# Patient Record
Sex: MALE | Race: WHITE | NOT HISPANIC OR LATINO
[De-identification: names, ages, dates, MRNs, and addresses within clinical notes are randomized per-mention and may not be internally consistent; named-entity substitution may affect disease eponyms.]

---

## 2017-01-04 ENCOUNTER — APPOINTMENT (OUTPATIENT)
Dept: ELECTROPHYSIOLOGY | Facility: HOSPITAL | Age: 82
End: 2017-01-04

## 2017-01-20 ENCOUNTER — NON-APPOINTMENT (OUTPATIENT)
Age: 82
End: 2017-01-20

## 2017-01-20 ENCOUNTER — APPOINTMENT (OUTPATIENT)
Dept: CARDIOLOGY | Facility: CLINIC | Age: 82
End: 2017-01-20

## 2017-01-20 VITALS
BODY MASS INDEX: 24.48 KG/M2 | OXYGEN SATURATION: 93 % | HEART RATE: 85 BPM | WEIGHT: 161 LBS | SYSTOLIC BLOOD PRESSURE: 112 MMHG | RESPIRATION RATE: 18 BRPM | DIASTOLIC BLOOD PRESSURE: 76 MMHG

## 2017-04-07 ENCOUNTER — NON-APPOINTMENT (OUTPATIENT)
Age: 82
End: 2017-04-07

## 2017-04-07 ENCOUNTER — APPOINTMENT (OUTPATIENT)
Dept: ELECTROPHYSIOLOGY | Facility: HOSPITAL | Age: 82
End: 2017-04-07

## 2017-04-07 VITALS — WEIGHT: 161 LBS | DIASTOLIC BLOOD PRESSURE: 72 MMHG | BODY MASS INDEX: 24.48 KG/M2 | SYSTOLIC BLOOD PRESSURE: 109 MMHG

## 2017-05-08 ENCOUNTER — APPOINTMENT (OUTPATIENT)
Dept: CARDIOLOGY | Facility: CLINIC | Age: 82
End: 2017-05-08

## 2017-05-08 VITALS
HEIGHT: 68 IN | TEMPERATURE: 97.6 F | BODY MASS INDEX: 24.4 KG/M2 | SYSTOLIC BLOOD PRESSURE: 118 MMHG | WEIGHT: 161 LBS | HEART RATE: 81 BPM | OXYGEN SATURATION: 99 % | DIASTOLIC BLOOD PRESSURE: 80 MMHG | RESPIRATION RATE: 17 BRPM

## 2017-05-08 RX ORDER — PNEUMOCOCCAL 23-VAL P-SAC VAC 25MCG/0.5
25 VIAL (ML) INJECTION
Qty: 1 | Refills: 0 | Status: COMPLETED | COMMUNITY
Start: 2016-10-07

## 2017-07-10 ENCOUNTER — APPOINTMENT (OUTPATIENT)
Dept: ELECTROPHYSIOLOGY | Facility: CLINIC | Age: 82
End: 2017-07-10
Payer: MEDICARE

## 2017-07-10 PROCEDURE — 93294 REM INTERROG EVL PM/LDLS PM: CPT

## 2017-09-18 ENCOUNTER — APPOINTMENT (OUTPATIENT)
Dept: CARDIOLOGY | Facility: CLINIC | Age: 82
End: 2017-09-18
Payer: MEDICARE

## 2017-09-18 ENCOUNTER — NON-APPOINTMENT (OUTPATIENT)
Age: 82
End: 2017-09-18

## 2017-09-18 VITALS
DIASTOLIC BLOOD PRESSURE: 88 MMHG | RESPIRATION RATE: 17 BRPM | HEART RATE: 67 BPM | TEMPERATURE: 98.4 F | WEIGHT: 157 LBS | OXYGEN SATURATION: 100 % | SYSTOLIC BLOOD PRESSURE: 131 MMHG | BODY MASS INDEX: 23.87 KG/M2

## 2017-09-18 VITALS — DIASTOLIC BLOOD PRESSURE: 78 MMHG | SYSTOLIC BLOOD PRESSURE: 117 MMHG

## 2017-09-18 PROCEDURE — 99214 OFFICE O/P EST MOD 30 MIN: CPT

## 2017-09-18 PROCEDURE — 93000 ELECTROCARDIOGRAM COMPLETE: CPT

## 2017-10-18 ENCOUNTER — APPOINTMENT (OUTPATIENT)
Dept: ELECTROPHYSIOLOGY | Facility: CLINIC | Age: 82
End: 2017-10-18
Payer: MEDICARE

## 2017-10-18 ENCOUNTER — NON-APPOINTMENT (OUTPATIENT)
Age: 82
End: 2017-10-18

## 2017-10-18 VITALS
DIASTOLIC BLOOD PRESSURE: 87 MMHG | WEIGHT: 160 LBS | BODY MASS INDEX: 24.25 KG/M2 | HEART RATE: 80 BPM | OXYGEN SATURATION: 97 % | HEIGHT: 68 IN | SYSTOLIC BLOOD PRESSURE: 122 MMHG

## 2017-10-18 PROCEDURE — 93280 PM DEVICE PROGR EVAL DUAL: CPT

## 2017-12-29 ENCOUNTER — APPOINTMENT (OUTPATIENT)
Dept: CARDIOLOGY | Facility: CLINIC | Age: 82
End: 2017-12-29
Payer: MEDICARE

## 2017-12-29 ENCOUNTER — NON-APPOINTMENT (OUTPATIENT)
Age: 82
End: 2017-12-29

## 2017-12-29 VITALS
SYSTOLIC BLOOD PRESSURE: 160 MMHG | BODY MASS INDEX: 24.33 KG/M2 | HEART RATE: 83 BPM | OXYGEN SATURATION: 96 % | TEMPERATURE: 97.7 F | DIASTOLIC BLOOD PRESSURE: 104 MMHG | WEIGHT: 160 LBS | RESPIRATION RATE: 18 BRPM

## 2017-12-29 DIAGNOSIS — R01.1 CARDIAC MURMUR, UNSPECIFIED: ICD-10-CM

## 2017-12-29 PROCEDURE — 93306 TTE W/DOPPLER COMPLETE: CPT

## 2017-12-29 PROCEDURE — 99215 OFFICE O/P EST HI 40 MIN: CPT

## 2017-12-29 PROCEDURE — 93000 ELECTROCARDIOGRAM COMPLETE: CPT | Mod: 59

## 2018-01-16 ENCOUNTER — OTHER (OUTPATIENT)
Age: 83
End: 2018-01-16

## 2018-01-29 ENCOUNTER — APPOINTMENT (OUTPATIENT)
Dept: ELECTROPHYSIOLOGY | Facility: CLINIC | Age: 83
End: 2018-01-29
Payer: MEDICARE

## 2018-01-29 PROCEDURE — 93294 REM INTERROG EVL PM/LDLS PM: CPT

## 2018-01-29 PROCEDURE — 93296 REM INTERROG EVL PM/IDS: CPT

## 2018-04-25 ENCOUNTER — NON-APPOINTMENT (OUTPATIENT)
Age: 83
End: 2018-04-25

## 2018-04-25 ENCOUNTER — APPOINTMENT (OUTPATIENT)
Dept: CARDIOLOGY | Facility: CLINIC | Age: 83
End: 2018-04-25

## 2018-04-25 ENCOUNTER — APPOINTMENT (OUTPATIENT)
Dept: CARDIOLOGY | Facility: CLINIC | Age: 83
End: 2018-04-25
Payer: MEDICARE

## 2018-04-25 VITALS
HEART RATE: 76 BPM | OXYGEN SATURATION: 99 % | RESPIRATION RATE: 18 BRPM | SYSTOLIC BLOOD PRESSURE: 126 MMHG | DIASTOLIC BLOOD PRESSURE: 82 MMHG | TEMPERATURE: 98.2 F | WEIGHT: 162 LBS | BODY MASS INDEX: 24.63 KG/M2

## 2018-04-25 PROCEDURE — 99214 OFFICE O/P EST MOD 30 MIN: CPT

## 2018-04-25 PROCEDURE — 93000 ELECTROCARDIOGRAM COMPLETE: CPT | Mod: 59

## 2018-04-30 ENCOUNTER — APPOINTMENT (OUTPATIENT)
Dept: ELECTROPHYSIOLOGY | Facility: CLINIC | Age: 83
End: 2018-04-30
Payer: MEDICARE

## 2018-04-30 VITALS
SYSTOLIC BLOOD PRESSURE: 121 MMHG | BODY MASS INDEX: 23.7 KG/M2 | WEIGHT: 160 LBS | DIASTOLIC BLOOD PRESSURE: 85 MMHG | HEART RATE: 68 BPM | HEIGHT: 69 IN | OXYGEN SATURATION: 98 %

## 2018-04-30 PROCEDURE — 93280 PM DEVICE PROGR EVAL DUAL: CPT

## 2018-06-18 ENCOUNTER — RX RENEWAL (OUTPATIENT)
Age: 83
End: 2018-06-18

## 2018-08-02 ENCOUNTER — APPOINTMENT (OUTPATIENT)
Dept: ELECTROPHYSIOLOGY | Facility: CLINIC | Age: 83
End: 2018-08-02
Payer: MEDICARE

## 2018-08-02 PROCEDURE — 93296 REM INTERROG EVL PM/IDS: CPT

## 2018-08-02 PROCEDURE — 93294 REM INTERROG EVL PM/LDLS PM: CPT

## 2018-09-05 ENCOUNTER — APPOINTMENT (OUTPATIENT)
Dept: CARDIOLOGY | Facility: CLINIC | Age: 83
End: 2018-09-05
Payer: MEDICARE

## 2018-09-05 ENCOUNTER — NON-APPOINTMENT (OUTPATIENT)
Age: 83
End: 2018-09-05

## 2018-09-05 VITALS
OXYGEN SATURATION: 99 % | BODY MASS INDEX: 23.04 KG/M2 | WEIGHT: 156 LBS | TEMPERATURE: 98.4 F | DIASTOLIC BLOOD PRESSURE: 89 MMHG | HEART RATE: 80 BPM | RESPIRATION RATE: 17 BRPM | SYSTOLIC BLOOD PRESSURE: 137 MMHG

## 2018-09-05 PROCEDURE — 99214 OFFICE O/P EST MOD 30 MIN: CPT

## 2018-09-05 PROCEDURE — 93000 ELECTROCARDIOGRAM COMPLETE: CPT | Mod: 59

## 2018-10-30 ENCOUNTER — APPOINTMENT (OUTPATIENT)
Dept: ELECTROPHYSIOLOGY | Facility: CLINIC | Age: 83
End: 2018-10-30
Payer: MEDICARE

## 2018-10-30 VITALS
WEIGHT: 160 LBS | HEART RATE: 88 BPM | OXYGEN SATURATION: 99 % | BODY MASS INDEX: 23.7 KG/M2 | DIASTOLIC BLOOD PRESSURE: 88 MMHG | HEIGHT: 69 IN | SYSTOLIC BLOOD PRESSURE: 133 MMHG

## 2018-10-30 PROCEDURE — 93280 PM DEVICE PROGR EVAL DUAL: CPT

## 2019-01-01 ENCOUNTER — NON-APPOINTMENT (OUTPATIENT)
Age: 84
End: 2019-01-01

## 2019-01-01 ENCOUNTER — TRANSCRIPTION ENCOUNTER (OUTPATIENT)
Age: 84
End: 2019-01-01

## 2019-01-01 ENCOUNTER — INPATIENT (INPATIENT)
Facility: HOSPITAL | Age: 84
LOS: 1 days | Discharge: HOME | End: 2019-10-30
Attending: SURGERY | Admitting: SURGERY
Payer: MEDICARE

## 2019-01-01 ENCOUNTER — APPOINTMENT (OUTPATIENT)
Dept: SURGICAL ONCOLOGY | Facility: HOSPITAL | Age: 84
End: 2019-01-01

## 2019-01-01 ENCOUNTER — RESULT REVIEW (OUTPATIENT)
Age: 84
End: 2019-01-01

## 2019-01-01 ENCOUNTER — APPOINTMENT (OUTPATIENT)
Dept: CARDIOLOGY | Facility: CLINIC | Age: 84
End: 2019-01-01
Payer: MEDICARE

## 2019-01-01 ENCOUNTER — APPOINTMENT (OUTPATIENT)
Dept: SURGICAL ONCOLOGY | Facility: CLINIC | Age: 84
End: 2019-01-01
Payer: MEDICARE

## 2019-01-01 ENCOUNTER — APPOINTMENT (OUTPATIENT)
Dept: ELECTROPHYSIOLOGY | Facility: CLINIC | Age: 84
End: 2019-01-01
Payer: MEDICARE

## 2019-01-01 VITALS
OXYGEN SATURATION: 100 % | HEART RATE: 63 BPM | RESPIRATION RATE: 17 BRPM | SYSTOLIC BLOOD PRESSURE: 138 MMHG | BODY MASS INDEX: 23.89 KG/M2 | WEIGHT: 148 LBS | TEMPERATURE: 97.4 F | DIASTOLIC BLOOD PRESSURE: 91 MMHG

## 2019-01-01 VITALS
OXYGEN SATURATION: 100 % | TEMPERATURE: 98 F | WEIGHT: 110.01 LBS | RESPIRATION RATE: 15 BRPM | HEART RATE: 88 BPM | SYSTOLIC BLOOD PRESSURE: 128 MMHG | DIASTOLIC BLOOD PRESSURE: 92 MMHG | HEIGHT: 63 IN

## 2019-01-01 VITALS
HEART RATE: 66 BPM | DIASTOLIC BLOOD PRESSURE: 79 MMHG | SYSTOLIC BLOOD PRESSURE: 142 MMHG | TEMPERATURE: 97 F | OXYGEN SATURATION: 94 % | RESPIRATION RATE: 16 BRPM

## 2019-01-01 VITALS — DIASTOLIC BLOOD PRESSURE: 84 MMHG | SYSTOLIC BLOOD PRESSURE: 148 MMHG

## 2019-01-01 VITALS
HEART RATE: 91 BPM | OXYGEN SATURATION: 98 % | HEIGHT: 66 IN | BODY MASS INDEX: 22.82 KG/M2 | SYSTOLIC BLOOD PRESSURE: 125 MMHG | DIASTOLIC BLOOD PRESSURE: 86 MMHG | WEIGHT: 142 LBS

## 2019-01-01 DIAGNOSIS — K80.50 CALCULUS OF BILE DUCT WITHOUT CHOLANGITIS OR CHOLECYSTITIS WITHOUT OBSTRUCTION: ICD-10-CM

## 2019-01-01 DIAGNOSIS — I44.2 ATRIOVENTRICULAR BLOCK, COMPLETE: ICD-10-CM

## 2019-01-01 DIAGNOSIS — Z98.890 OTHER SPECIFIED POSTPROCEDURAL STATES: Chronic | ICD-10-CM

## 2019-01-01 DIAGNOSIS — K81.1 CHRONIC CHOLECYSTITIS: ICD-10-CM

## 2019-01-01 DIAGNOSIS — Z90.49 ACQUIRED ABSENCE OF OTHER SPECIFIED PARTS OF DIGESTIVE TRACT: Chronic | ICD-10-CM

## 2019-01-01 DIAGNOSIS — K80.70 CALCULUS OF GALLBLADDER AND BILE DUCT W/OUT CHOLECYSTITIS W/OUT OBSTRUCTION: ICD-10-CM

## 2019-01-01 LAB
ALBUMIN SERPL ELPH-MCNC: 3.1 G/DL — LOW (ref 3.3–5)
ALBUMIN SERPL ELPH-MCNC: 3.5 G/DL — SIGNIFICANT CHANGE UP (ref 3.3–5)
ALP SERPL-CCNC: 54 U/L — SIGNIFICANT CHANGE UP (ref 40–120)
ALP SERPL-CCNC: 59 U/L — SIGNIFICANT CHANGE UP (ref 40–120)
ALT FLD-CCNC: 166 U/L — HIGH (ref 4–41)
ALT FLD-CCNC: 202 U/L — HIGH (ref 4–41)
ANION GAP SERPL CALC-SCNC: 12 MMO/L — SIGNIFICANT CHANGE UP (ref 7–14)
ANION GAP SERPL CALC-SCNC: 15 MMO/L — HIGH (ref 7–14)
AST SERPL-CCNC: 202 U/L — HIGH (ref 4–40)
AST SERPL-CCNC: 258 U/L — HIGH (ref 4–40)
BASE EXCESS BLDA CALC-SCNC: 0 MMOL/L — SIGNIFICANT CHANGE UP
BILIRUB SERPL-MCNC: 0.3 MG/DL — SIGNIFICANT CHANGE UP (ref 0.2–1.2)
BILIRUB SERPL-MCNC: 0.4 MG/DL — SIGNIFICANT CHANGE UP (ref 0.2–1.2)
BUN SERPL-MCNC: 25 MG/DL — HIGH (ref 7–23)
BUN SERPL-MCNC: 26 MG/DL — HIGH (ref 7–23)
CA-I BLDA-SCNC: 1.19 MMOL/L — SIGNIFICANT CHANGE UP (ref 1.15–1.29)
CALCIUM SERPL-MCNC: 9 MG/DL — SIGNIFICANT CHANGE UP (ref 8.4–10.5)
CALCIUM SERPL-MCNC: 9.1 MG/DL — SIGNIFICANT CHANGE UP (ref 8.4–10.5)
CHLORIDE SERPL-SCNC: 101 MMOL/L — SIGNIFICANT CHANGE UP (ref 98–107)
CHLORIDE SERPL-SCNC: 103 MMOL/L — SIGNIFICANT CHANGE UP (ref 98–107)
CO2 SERPL-SCNC: 21 MMOL/L — LOW (ref 22–31)
CO2 SERPL-SCNC: 23 MMOL/L — SIGNIFICANT CHANGE UP (ref 22–31)
CREAT SERPL-MCNC: 1.12 MG/DL — SIGNIFICANT CHANGE UP (ref 0.5–1.3)
CREAT SERPL-MCNC: 1.16 MG/DL — SIGNIFICANT CHANGE UP (ref 0.5–1.3)
GLUCOSE BLDA-MCNC: 78 MG/DL — SIGNIFICANT CHANGE UP (ref 70–99)
GLUCOSE SERPL-MCNC: 102 MG/DL — HIGH (ref 70–99)
GLUCOSE SERPL-MCNC: 117 MG/DL — HIGH (ref 70–99)
HCO3 BLDA-SCNC: 25 MMOL/L — SIGNIFICANT CHANGE UP (ref 22–26)
HCT VFR BLD CALC: 29.2 % — LOW (ref 39–50)
HCT VFR BLD CALC: 29.8 % — LOW (ref 39–50)
HCT VFR BLDA CALC: 30 % — LOW (ref 39–51)
HGB BLD-MCNC: 9.2 G/DL — LOW (ref 13–17)
HGB BLD-MCNC: 9.7 G/DL — LOW (ref 13–17)
HGB BLDA-MCNC: 9.7 G/DL — LOW (ref 13–17)
MAGNESIUM SERPL-MCNC: 1.6 MG/DL — SIGNIFICANT CHANGE UP (ref 1.6–2.6)
MAGNESIUM SERPL-MCNC: 1.9 MG/DL — SIGNIFICANT CHANGE UP (ref 1.6–2.6)
MCHC RBC-ENTMCNC: 30.9 % — LOW (ref 32–36)
MCHC RBC-ENTMCNC: 30.9 PG — SIGNIFICANT CHANGE UP (ref 27–34)
MCHC RBC-ENTMCNC: 32.1 PG — SIGNIFICANT CHANGE UP (ref 27–34)
MCHC RBC-ENTMCNC: 33.2 % — SIGNIFICANT CHANGE UP (ref 32–36)
MCV RBC AUTO: 100 FL — SIGNIFICANT CHANGE UP (ref 80–100)
MCV RBC AUTO: 96.7 FL — SIGNIFICANT CHANGE UP (ref 80–100)
NRBC # FLD: 0 K/UL — SIGNIFICANT CHANGE UP (ref 0–0)
NRBC # FLD: 0 K/UL — SIGNIFICANT CHANGE UP (ref 0–0)
PCO2 BLDA: 35 MMHG — SIGNIFICANT CHANGE UP (ref 35–48)
PH BLDA: 7.44 PH — SIGNIFICANT CHANGE UP (ref 7.35–7.45)
PHOSPHATE SERPL-MCNC: 4.5 MG/DL — SIGNIFICANT CHANGE UP (ref 2.5–4.5)
PHOSPHATE SERPL-MCNC: 5 MG/DL — HIGH (ref 2.5–4.5)
PLATELET # BLD AUTO: 136 K/UL — LOW (ref 150–400)
PLATELET # BLD AUTO: 146 K/UL — LOW (ref 150–400)
PMV BLD: 10.2 FL — SIGNIFICANT CHANGE UP (ref 7–13)
PMV BLD: 9.6 FL — SIGNIFICANT CHANGE UP (ref 7–13)
PO2 BLDA: 377 MMHG — HIGH (ref 83–108)
POTASSIUM BLDA-SCNC: 3.8 MMOL/L — SIGNIFICANT CHANGE UP (ref 3.4–4.5)
POTASSIUM SERPL-MCNC: 3.8 MMOL/L — SIGNIFICANT CHANGE UP (ref 3.5–5.3)
POTASSIUM SERPL-MCNC: 4.2 MMOL/L — SIGNIFICANT CHANGE UP (ref 3.5–5.3)
POTASSIUM SERPL-SCNC: 3.8 MMOL/L — SIGNIFICANT CHANGE UP (ref 3.5–5.3)
POTASSIUM SERPL-SCNC: 4.2 MMOL/L — SIGNIFICANT CHANGE UP (ref 3.5–5.3)
PROT SERPL-MCNC: 6 G/DL — SIGNIFICANT CHANGE UP (ref 6–8.3)
PROT SERPL-MCNC: 6.5 G/DL — SIGNIFICANT CHANGE UP (ref 6–8.3)
RBC # BLD: 2.98 M/UL — LOW (ref 4.2–5.8)
RBC # BLD: 3.02 M/UL — LOW (ref 4.2–5.8)
RBC # FLD: 14.2 % — SIGNIFICANT CHANGE UP (ref 10.3–14.5)
RBC # FLD: 14.2 % — SIGNIFICANT CHANGE UP (ref 10.3–14.5)
RH IG SCN BLD-IMP: NEGATIVE — SIGNIFICANT CHANGE UP
SAO2 % BLDA: 100 % — HIGH (ref 95–99)
SODIUM BLDA-SCNC: 136 MMOL/L — SIGNIFICANT CHANGE UP (ref 136–146)
SODIUM SERPL-SCNC: 136 MMOL/L — SIGNIFICANT CHANGE UP (ref 135–145)
SODIUM SERPL-SCNC: 139 MMOL/L — SIGNIFICANT CHANGE UP (ref 135–145)
SURGICAL PATHOLOGY STUDY: SIGNIFICANT CHANGE UP
WBC # BLD: 11.68 K/UL — HIGH (ref 3.8–10.5)
WBC # BLD: 13.18 K/UL — HIGH (ref 3.8–10.5)
WBC # FLD AUTO: 11.68 K/UL — HIGH (ref 3.8–10.5)
WBC # FLD AUTO: 13.18 K/UL — HIGH (ref 3.8–10.5)

## 2019-01-01 PROCEDURE — 93294 REM INTERROG EVL PM/LDLS PM: CPT

## 2019-01-01 PROCEDURE — 99215 OFFICE O/P EST HI 40 MIN: CPT

## 2019-01-01 PROCEDURE — S2900 ROBOTIC SURGICAL SYSTEM: CPT | Mod: NC

## 2019-01-01 PROCEDURE — 93000 ELECTROCARDIOGRAM COMPLETE: CPT | Mod: 59

## 2019-01-01 PROCEDURE — 49600 REPAIR UMBILICAL LESION: CPT

## 2019-01-01 PROCEDURE — 99024 POSTOP FOLLOW-UP VISIT: CPT

## 2019-01-01 PROCEDURE — 47300 SURGERY FOR LIVER LESION: CPT

## 2019-01-01 PROCEDURE — 44050 REDUCE BOWEL OBSTRUCTION: CPT

## 2019-01-01 PROCEDURE — 88304 TISSUE EXAM BY PATHOLOGIST: CPT | Mod: 26

## 2019-01-01 PROCEDURE — 93296 REM INTERROG EVL PM/IDS: CPT

## 2019-01-01 RX ORDER — TAMSULOSIN HYDROCHLORIDE 0.4 MG/1
0.4 CAPSULE ORAL AT BEDTIME
Refills: 0 | Status: DISCONTINUED | OUTPATIENT
Start: 2019-01-01 | End: 2019-01-01

## 2019-01-01 RX ORDER — METOPROLOL TARTRATE 50 MG
12.5 TABLET ORAL
Qty: 0 | Refills: 0 | DISCHARGE

## 2019-01-01 RX ORDER — OXYCODONE HYDROCHLORIDE 5 MG/1
1 TABLET ORAL
Qty: 18 | Refills: 0
Start: 2019-01-01

## 2019-01-01 RX ORDER — ENOXAPARIN SODIUM 100 MG/ML
40 INJECTION SUBCUTANEOUS DAILY
Refills: 0 | Status: DISCONTINUED | OUTPATIENT
Start: 2019-01-01 | End: 2019-01-01

## 2019-01-01 RX ORDER — METOPROLOL TARTRATE 50 MG
12.5 TABLET ORAL
Refills: 0 | Status: DISCONTINUED | OUTPATIENT
Start: 2019-01-01 | End: 2019-01-01

## 2019-01-01 RX ORDER — MAGNESIUM SULFATE 500 MG/ML
2 VIAL (ML) INJECTION ONCE
Refills: 0 | Status: COMPLETED | OUTPATIENT
Start: 2019-01-01 | End: 2019-01-01

## 2019-01-01 RX ORDER — CHOLECALCIFEROL (VITAMIN D3) 125 MCG
1 CAPSULE ORAL
Qty: 0 | Refills: 0 | DISCHARGE

## 2019-01-01 RX ORDER — ASPIRIN/CALCIUM CARB/MAGNESIUM 324 MG
1 TABLET ORAL
Qty: 0 | Refills: 0 | DISCHARGE

## 2019-01-01 RX ORDER — OXYCODONE HYDROCHLORIDE 5 MG/1
5 TABLET ORAL EVERY 4 HOURS
Refills: 0 | Status: DISCONTINUED | OUTPATIENT
Start: 2019-01-01 | End: 2019-01-01

## 2019-01-01 RX ORDER — POTASSIUM CHLORIDE 20 MEQ
20 PACKET (EA) ORAL ONCE
Refills: 0 | Status: COMPLETED | OUTPATIENT
Start: 2019-01-01 | End: 2019-01-01

## 2019-01-01 RX ORDER — LEVOTHYROXINE SODIUM 125 MCG
1 TABLET ORAL
Qty: 0 | Refills: 0 | DISCHARGE

## 2019-01-01 RX ORDER — SIMVASTATIN 20 MG/1
10 TABLET, FILM COATED ORAL AT BEDTIME
Refills: 0 | Status: DISCONTINUED | OUTPATIENT
Start: 2019-01-01 | End: 2019-01-01

## 2019-01-01 RX ORDER — SIMVASTATIN 20 MG/1
1 TABLET, FILM COATED ORAL
Qty: 0 | Refills: 0 | DISCHARGE

## 2019-01-01 RX ORDER — ASPIRIN/CALCIUM CARB/MAGNESIUM 324 MG
81 TABLET ORAL DAILY
Refills: 0 | Status: DISCONTINUED | OUTPATIENT
Start: 2019-01-01 | End: 2019-01-01

## 2019-01-01 RX ORDER — ACETAMINOPHEN 500 MG
2 TABLET ORAL
Qty: 0 | Refills: 0 | DISCHARGE
Start: 2019-01-01

## 2019-01-01 RX ORDER — ONDANSETRON 8 MG/1
4 TABLET, FILM COATED ORAL ONCE
Refills: 0 | Status: COMPLETED | OUTPATIENT
Start: 2019-01-01 | End: 2019-01-01

## 2019-01-01 RX ORDER — ACETAMINOPHEN 500 MG
1000 TABLET ORAL EVERY 6 HOURS
Refills: 0 | Status: COMPLETED | OUTPATIENT
Start: 2019-01-01 | End: 2019-01-01

## 2019-01-01 RX ORDER — MULTIVIT-MIN/FERROUS GLUCONATE 9 MG/15 ML
1 LIQUID (ML) ORAL
Qty: 0 | Refills: 0 | DISCHARGE

## 2019-01-01 RX ORDER — ACETAMINOPHEN 500 MG
2 TABLET ORAL
Qty: 0 | Refills: 0 | DISCHARGE

## 2019-01-01 RX ORDER — TAMSULOSIN HYDROCHLORIDE 0.4 MG/1
1 CAPSULE ORAL
Qty: 0 | Refills: 0 | DISCHARGE

## 2019-01-01 RX ORDER — SODIUM CHLORIDE 9 MG/ML
1000 INJECTION, SOLUTION INTRAVENOUS
Refills: 0 | Status: DISCONTINUED | OUTPATIENT
Start: 2019-01-01 | End: 2019-01-01

## 2019-01-01 RX ORDER — ACETAMINOPHEN 500 MG
650 TABLET ORAL EVERY 6 HOURS
Refills: 0 | Status: DISCONTINUED | OUTPATIENT
Start: 2019-01-01 | End: 2019-01-01

## 2019-01-01 RX ORDER — OXYCODONE HYDROCHLORIDE 5 MG/1
10 TABLET ORAL EVERY 4 HOURS
Refills: 0 | Status: DISCONTINUED | OUTPATIENT
Start: 2019-01-01 | End: 2019-01-01

## 2019-01-01 RX ORDER — METOPROLOL TARTRATE 50 MG
12.5 TABLET ORAL
Qty: 0 | Refills: 0 | DISCHARGE
Start: 2019-01-01

## 2019-01-01 RX ORDER — MAGNESIUM SULFATE 500 MG/ML
2 VIAL (ML) INJECTION ONCE
Refills: 0 | Status: DISCONTINUED | OUTPATIENT
Start: 2019-01-01 | End: 2019-01-01

## 2019-01-01 RX ORDER — LEVOTHYROXINE SODIUM 125 MCG
150 TABLET ORAL DAILY
Refills: 0 | Status: DISCONTINUED | OUTPATIENT
Start: 2019-01-01 | End: 2019-01-01

## 2019-01-01 RX ORDER — OMEGA-3 ACID ETHYL ESTERS 1 G
1 CAPSULE ORAL
Qty: 0 | Refills: 0 | DISCHARGE

## 2019-01-01 RX ADMIN — ENOXAPARIN SODIUM 40 MILLIGRAM(S): 100 INJECTION SUBCUTANEOUS at 12:44

## 2019-01-01 RX ADMIN — SIMVASTATIN 10 MILLIGRAM(S): 20 TABLET, FILM COATED ORAL at 22:10

## 2019-01-01 RX ADMIN — Medication 81 MILLIGRAM(S): at 12:44

## 2019-01-01 RX ADMIN — OXYCODONE HYDROCHLORIDE 10 MILLIGRAM(S): 5 TABLET ORAL at 09:30

## 2019-01-01 RX ADMIN — TAMSULOSIN HYDROCHLORIDE 0.4 MILLIGRAM(S): 0.4 CAPSULE ORAL at 21:57

## 2019-01-01 RX ADMIN — Medication 1000 MILLIGRAM(S): at 01:08

## 2019-01-01 RX ADMIN — Medication 12.5 MILLIGRAM(S): at 05:59

## 2019-01-01 RX ADMIN — TAMSULOSIN HYDROCHLORIDE 0.4 MILLIGRAM(S): 0.4 CAPSULE ORAL at 21:18

## 2019-01-01 RX ADMIN — Medication 1000 MILLIGRAM(S): at 13:30

## 2019-01-01 RX ADMIN — Medication 150 MICROGRAM(S): at 05:54

## 2019-01-01 RX ADMIN — Medication 150 MICROGRAM(S): at 05:59

## 2019-01-01 RX ADMIN — Medication 81 MILLIGRAM(S): at 12:12

## 2019-01-01 RX ADMIN — ONDANSETRON 4 MILLIGRAM(S): 8 TABLET, FILM COATED ORAL at 19:43

## 2019-01-01 RX ADMIN — Medication 400 MILLIGRAM(S): at 05:54

## 2019-01-01 RX ADMIN — Medication 400 MILLIGRAM(S): at 12:44

## 2019-01-01 RX ADMIN — OXYCODONE HYDROCHLORIDE 10 MILLIGRAM(S): 5 TABLET ORAL at 09:08

## 2019-01-01 RX ADMIN — Medication 650 MILLIGRAM(S): at 12:42

## 2019-01-01 RX ADMIN — OXYCODONE HYDROCHLORIDE 10 MILLIGRAM(S): 5 TABLET ORAL at 00:08

## 2019-01-01 RX ADMIN — SODIUM CHLORIDE 50 MILLILITER(S): 9 INJECTION, SOLUTION INTRAVENOUS at 08:00

## 2019-01-01 RX ADMIN — Medication 50 GRAM(S): at 00:33

## 2019-01-01 RX ADMIN — Medication 1000 MILLIGRAM(S): at 06:24

## 2019-01-01 RX ADMIN — ENOXAPARIN SODIUM 40 MILLIGRAM(S): 100 INJECTION SUBCUTANEOUS at 12:12

## 2019-01-01 RX ADMIN — Medication 400 MILLIGRAM(S): at 00:38

## 2019-01-01 RX ADMIN — Medication 12.5 MILLIGRAM(S): at 18:02

## 2019-01-01 RX ADMIN — OXYCODONE HYDROCHLORIDE 10 MILLIGRAM(S): 5 TABLET ORAL at 01:00

## 2019-01-01 RX ADMIN — Medication 20 MILLIEQUIVALENT(S): at 08:02

## 2019-01-01 RX ADMIN — Medication 650 MILLIGRAM(S): at 12:12

## 2019-01-01 RX ADMIN — SIMVASTATIN 10 MILLIGRAM(S): 20 TABLET, FILM COATED ORAL at 21:57

## 2019-02-12 ENCOUNTER — APPOINTMENT (OUTPATIENT)
Dept: ELECTROPHYSIOLOGY | Facility: CLINIC | Age: 84
End: 2019-02-12
Payer: MEDICARE

## 2019-02-12 PROCEDURE — 93294 REM INTERROG EVL PM/LDLS PM: CPT

## 2019-02-12 PROCEDURE — 93296 REM INTERROG EVL PM/IDS: CPT

## 2019-03-01 ENCOUNTER — MEDICATION RENEWAL (OUTPATIENT)
Age: 84
End: 2019-03-01

## 2019-03-04 ENCOUNTER — RX RENEWAL (OUTPATIENT)
Age: 84
End: 2019-03-04

## 2019-03-22 ENCOUNTER — INPATIENT (INPATIENT)
Facility: HOSPITAL | Age: 84
LOS: 6 days | Discharge: HOME | DRG: 417 | End: 2019-03-29
Attending: SURGERY | Admitting: SURGERY
Payer: MEDICARE

## 2019-03-22 VITALS
HEART RATE: 84 BPM | TEMPERATURE: 98 F | OXYGEN SATURATION: 95 % | RESPIRATION RATE: 18 BRPM | WEIGHT: 160.06 LBS | HEIGHT: 70 IN | SYSTOLIC BLOOD PRESSURE: 134 MMHG | DIASTOLIC BLOOD PRESSURE: 83 MMHG

## 2019-03-22 DIAGNOSIS — K85.10 BILIARY ACUTE PANCREATITIS WITHOUT NECROSIS OR INFECTION: ICD-10-CM

## 2019-03-22 LAB
ALBUMIN SERPL ELPH-MCNC: 3.8 G/DL — SIGNIFICANT CHANGE UP (ref 3.3–5)
ALP SERPL-CCNC: 341 U/L — HIGH (ref 40–120)
ALT FLD-CCNC: 429 U/L — HIGH (ref 10–45)
ANION GAP SERPL CALC-SCNC: 12 MMOL/L — SIGNIFICANT CHANGE UP (ref 5–17)
APPEARANCE UR: CLEAR — SIGNIFICANT CHANGE UP
AST SERPL-CCNC: 537 U/L — HIGH (ref 10–40)
BACTERIA # UR AUTO: NEGATIVE — SIGNIFICANT CHANGE UP
BASOPHILS # BLD AUTO: 0 K/UL — SIGNIFICANT CHANGE UP (ref 0–0.2)
BASOPHILS NFR BLD AUTO: 0.1 % — SIGNIFICANT CHANGE UP (ref 0–2)
BILIRUB SERPL-MCNC: 1.2 MG/DL — SIGNIFICANT CHANGE UP (ref 0.2–1.2)
BILIRUB UR-MCNC: NEGATIVE — SIGNIFICANT CHANGE UP
BUN SERPL-MCNC: 25 MG/DL — HIGH (ref 7–23)
CALCIUM SERPL-MCNC: 9.5 MG/DL — SIGNIFICANT CHANGE UP (ref 8.4–10.5)
CHLORIDE SERPL-SCNC: 100 MMOL/L — SIGNIFICANT CHANGE UP (ref 96–108)
CO2 SERPL-SCNC: 26 MMOL/L — SIGNIFICANT CHANGE UP (ref 22–31)
COLOR SPEC: YELLOW — SIGNIFICANT CHANGE UP
CREAT SERPL-MCNC: 1.06 MG/DL — SIGNIFICANT CHANGE UP (ref 0.5–1.3)
DIFF PNL FLD: ABNORMAL
EOSINOPHIL # BLD AUTO: 0.1 K/UL — SIGNIFICANT CHANGE UP (ref 0–0.5)
EOSINOPHIL NFR BLD AUTO: 0.5 % — SIGNIFICANT CHANGE UP (ref 0–6)
EPI CELLS # UR: 0 /HPF — SIGNIFICANT CHANGE UP
GLUCOSE SERPL-MCNC: 109 MG/DL — HIGH (ref 70–99)
GLUCOSE UR QL: NEGATIVE — SIGNIFICANT CHANGE UP
HCT VFR BLD CALC: 34 % — LOW (ref 39–50)
HGB BLD-MCNC: 11.3 G/DL — LOW (ref 13–17)
HYALINE CASTS # UR AUTO: 0 /LPF — SIGNIFICANT CHANGE UP (ref 0–2)
KETONES UR-MCNC: NEGATIVE — SIGNIFICANT CHANGE UP
LACTATE BLDV-MCNC: 1.3 MMOL/L — SIGNIFICANT CHANGE UP (ref 0.7–2)
LEUKOCYTE ESTERASE UR-ACNC: NEGATIVE — SIGNIFICANT CHANGE UP
LIDOCAIN IGE QN: >530 U/L — HIGH (ref 7–60)
LYMPHOCYTES # BLD AUTO: 1 K/UL — SIGNIFICANT CHANGE UP (ref 1–3.3)
LYMPHOCYTES # BLD AUTO: 9.6 % — LOW (ref 13–44)
MCHC RBC-ENTMCNC: 33.1 PG — SIGNIFICANT CHANGE UP (ref 27–34)
MCHC RBC-ENTMCNC: 33.2 GM/DL — SIGNIFICANT CHANGE UP (ref 32–36)
MCV RBC AUTO: 99.8 FL — SIGNIFICANT CHANGE UP (ref 80–100)
MONOCYTES # BLD AUTO: 0.6 K/UL — SIGNIFICANT CHANGE UP (ref 0–0.9)
MONOCYTES NFR BLD AUTO: 5.8 % — SIGNIFICANT CHANGE UP (ref 2–14)
NEUTROPHILS # BLD AUTO: 8.6 K/UL — HIGH (ref 1.8–7.4)
NEUTROPHILS NFR BLD AUTO: 84 % — HIGH (ref 43–77)
NITRITE UR-MCNC: NEGATIVE — SIGNIFICANT CHANGE UP
PH UR: 7.5 — SIGNIFICANT CHANGE UP (ref 5–8)
PLATELET # BLD AUTO: 205 K/UL — SIGNIFICANT CHANGE UP (ref 150–400)
POTASSIUM SERPL-MCNC: 4.1 MMOL/L — SIGNIFICANT CHANGE UP (ref 3.5–5.3)
POTASSIUM SERPL-SCNC: 4.1 MMOL/L — SIGNIFICANT CHANGE UP (ref 3.5–5.3)
PROT SERPL-MCNC: 7 G/DL — SIGNIFICANT CHANGE UP (ref 6–8.3)
PROT UR-MCNC: ABNORMAL
RBC # BLD: 3.41 M/UL — LOW (ref 4.2–5.8)
RBC # FLD: 12.1 % — SIGNIFICANT CHANGE UP (ref 10.3–14.5)
RBC CASTS # UR COMP ASSIST: 2 /HPF — SIGNIFICANT CHANGE UP (ref 0–4)
SODIUM SERPL-SCNC: 138 MMOL/L — SIGNIFICANT CHANGE UP (ref 135–145)
SP GR SPEC: 1.02 — SIGNIFICANT CHANGE UP (ref 1.01–1.02)
UROBILINOGEN FLD QL: NEGATIVE — SIGNIFICANT CHANGE UP
WBC # BLD: 10.2 K/UL — SIGNIFICANT CHANGE UP (ref 3.8–10.5)
WBC # FLD AUTO: 10.2 K/UL — SIGNIFICANT CHANGE UP (ref 3.8–10.5)
WBC UR QL: 1 /HPF — SIGNIFICANT CHANGE UP (ref 0–5)

## 2019-03-22 PROCEDURE — 74177 CT ABD & PELVIS W/CONTRAST: CPT | Mod: 26

## 2019-03-22 PROCEDURE — 99222 1ST HOSP IP/OBS MODERATE 55: CPT | Mod: GC

## 2019-03-22 PROCEDURE — 99285 EMERGENCY DEPT VISIT HI MDM: CPT | Mod: GC,25

## 2019-03-22 PROCEDURE — 71045 X-RAY EXAM CHEST 1 VIEW: CPT | Mod: 26

## 2019-03-22 PROCEDURE — 93010 ELECTROCARDIOGRAM REPORT: CPT | Mod: GC

## 2019-03-22 RX ORDER — DEXTROSE MONOHYDRATE, SODIUM CHLORIDE, AND POTASSIUM CHLORIDE 50; .745; 4.5 G/1000ML; G/1000ML; G/1000ML
1000 INJECTION, SOLUTION INTRAVENOUS
Qty: 0 | Refills: 0 | Status: DISCONTINUED | OUTPATIENT
Start: 2019-03-22 | End: 2019-03-26

## 2019-03-22 RX ORDER — TAMSULOSIN HYDROCHLORIDE 0.4 MG/1
0.4 CAPSULE ORAL AT BEDTIME
Qty: 0 | Refills: 0 | Status: DISCONTINUED | OUTPATIENT
Start: 2019-03-22 | End: 2019-03-27

## 2019-03-22 RX ORDER — LEVOTHYROXINE SODIUM 125 MCG
150 TABLET ORAL DAILY
Qty: 0 | Refills: 0 | Status: DISCONTINUED | OUTPATIENT
Start: 2019-03-22 | End: 2019-03-27

## 2019-03-22 RX ORDER — SIMVASTATIN 20 MG/1
10 TABLET, FILM COATED ORAL AT BEDTIME
Qty: 0 | Refills: 0 | Status: DISCONTINUED | OUTPATIENT
Start: 2019-03-22 | End: 2019-03-27

## 2019-03-22 RX ORDER — DOCUSATE SODIUM 100 MG
100 CAPSULE ORAL THREE TIMES A DAY
Qty: 0 | Refills: 0 | Status: DISCONTINUED | OUTPATIENT
Start: 2019-03-22 | End: 2019-03-27

## 2019-03-22 RX ORDER — FAMOTIDINE 10 MG/ML
20 INJECTION INTRAVENOUS ONCE
Qty: 0 | Refills: 0 | Status: COMPLETED | OUTPATIENT
Start: 2019-03-22 | End: 2019-03-22

## 2019-03-22 RX ORDER — MORPHINE SULFATE 50 MG/1
2 CAPSULE, EXTENDED RELEASE ORAL ONCE
Qty: 0 | Refills: 0 | Status: DISCONTINUED | OUTPATIENT
Start: 2019-03-22 | End: 2019-03-22

## 2019-03-22 RX ORDER — LOSARTAN POTASSIUM 100 MG/1
50 TABLET, FILM COATED ORAL DAILY
Qty: 0 | Refills: 0 | Status: DISCONTINUED | OUTPATIENT
Start: 2019-03-22 | End: 2019-03-26

## 2019-03-22 RX ORDER — ACETAMINOPHEN 500 MG
975 TABLET ORAL ONCE
Qty: 0 | Refills: 0 | Status: COMPLETED | OUTPATIENT
Start: 2019-03-22 | End: 2019-03-22

## 2019-03-22 RX ORDER — SENNA PLUS 8.6 MG/1
2 TABLET ORAL AT BEDTIME
Qty: 0 | Refills: 0 | Status: DISCONTINUED | OUTPATIENT
Start: 2019-03-22 | End: 2019-03-27

## 2019-03-22 RX ORDER — ASPIRIN/CALCIUM CARB/MAGNESIUM 324 MG
81 TABLET ORAL DAILY
Qty: 0 | Refills: 0 | Status: DISCONTINUED | OUTPATIENT
Start: 2019-03-22 | End: 2019-03-27

## 2019-03-22 RX ORDER — ACETAMINOPHEN 500 MG
325 TABLET ORAL EVERY 4 HOURS
Qty: 0 | Refills: 0 | Status: DISCONTINUED | OUTPATIENT
Start: 2019-03-22 | End: 2019-03-27

## 2019-03-22 RX ORDER — OXYCODONE AND ACETAMINOPHEN 5; 325 MG/1; MG/1
1 TABLET ORAL EVERY 4 HOURS
Qty: 0 | Refills: 0 | Status: DISCONTINUED | OUTPATIENT
Start: 2019-03-22 | End: 2019-03-27

## 2019-03-22 RX ORDER — PIPERACILLIN AND TAZOBACTAM 4; .5 G/20ML; G/20ML
3.38 INJECTION, POWDER, LYOPHILIZED, FOR SOLUTION INTRAVENOUS ONCE
Qty: 0 | Refills: 0 | Status: COMPLETED | OUTPATIENT
Start: 2019-03-22 | End: 2019-03-22

## 2019-03-22 RX ORDER — SODIUM CHLORIDE 9 MG/ML
500 INJECTION INTRAMUSCULAR; INTRAVENOUS; SUBCUTANEOUS ONCE
Qty: 0 | Refills: 0 | Status: COMPLETED | OUTPATIENT
Start: 2019-03-22 | End: 2019-03-22

## 2019-03-22 RX ORDER — SODIUM CHLORIDE 9 MG/ML
1000 INJECTION INTRAMUSCULAR; INTRAVENOUS; SUBCUTANEOUS ONCE
Qty: 0 | Refills: 0 | Status: COMPLETED | OUTPATIENT
Start: 2019-03-22 | End: 2019-03-22

## 2019-03-22 RX ORDER — OXYCODONE AND ACETAMINOPHEN 5; 325 MG/1; MG/1
2 TABLET ORAL EVERY 6 HOURS
Qty: 0 | Refills: 0 | Status: DISCONTINUED | OUTPATIENT
Start: 2019-03-22 | End: 2019-03-27

## 2019-03-22 RX ADMIN — Medication 325 MILLIGRAM(S): at 14:42

## 2019-03-22 RX ADMIN — LOSARTAN POTASSIUM 50 MILLIGRAM(S): 100 TABLET, FILM COATED ORAL at 09:43

## 2019-03-22 RX ADMIN — Medication 100 MILLIGRAM(S): at 21:36

## 2019-03-22 RX ADMIN — SODIUM CHLORIDE 500 MILLILITER(S): 9 INJECTION INTRAMUSCULAR; INTRAVENOUS; SUBCUTANEOUS at 06:11

## 2019-03-22 RX ADMIN — FAMOTIDINE 20 MILLIGRAM(S): 10 INJECTION INTRAVENOUS at 03:07

## 2019-03-22 RX ADMIN — Medication 325 MILLIGRAM(S): at 14:54

## 2019-03-22 RX ADMIN — Medication 100 MILLIGRAM(S): at 14:42

## 2019-03-22 RX ADMIN — TAMSULOSIN HYDROCHLORIDE 0.4 MILLIGRAM(S): 0.4 CAPSULE ORAL at 21:36

## 2019-03-22 RX ADMIN — MORPHINE SULFATE 2 MILLIGRAM(S): 50 CAPSULE, EXTENDED RELEASE ORAL at 07:53

## 2019-03-22 RX ADMIN — PIPERACILLIN AND TAZOBACTAM 200 GRAM(S): 4; .5 INJECTION, POWDER, LYOPHILIZED, FOR SOLUTION INTRAVENOUS at 06:53

## 2019-03-22 RX ADMIN — DEXTROSE MONOHYDRATE, SODIUM CHLORIDE, AND POTASSIUM CHLORIDE 100 MILLILITER(S): 50; .745; 4.5 INJECTION, SOLUTION INTRAVENOUS at 09:38

## 2019-03-22 RX ADMIN — SODIUM CHLORIDE 1000 MILLILITER(S): 9 INJECTION INTRAMUSCULAR; INTRAVENOUS; SUBCUTANEOUS at 06:19

## 2019-03-22 RX ADMIN — MORPHINE SULFATE 2 MILLIGRAM(S): 50 CAPSULE, EXTENDED RELEASE ORAL at 06:53

## 2019-03-22 RX ADMIN — Medication 975 MILLIGRAM(S): at 06:10

## 2019-03-22 RX ADMIN — SODIUM CHLORIDE 1000 MILLILITER(S): 9 INJECTION INTRAMUSCULAR; INTRAVENOUS; SUBCUTANEOUS at 06:54

## 2019-03-22 RX ADMIN — SIMVASTATIN 10 MILLIGRAM(S): 20 TABLET, FILM COATED ORAL at 09:43

## 2019-03-22 RX ADMIN — SODIUM CHLORIDE 1000 MILLILITER(S): 9 INJECTION INTRAMUSCULAR; INTRAVENOUS; SUBCUTANEOUS at 03:06

## 2019-03-22 RX ADMIN — SIMVASTATIN 10 MILLIGRAM(S): 20 TABLET, FILM COATED ORAL at 21:37

## 2019-03-22 RX ADMIN — Medication 81 MILLIGRAM(S): at 09:43

## 2019-03-22 RX ADMIN — Medication 150 MICROGRAM(S): at 09:43

## 2019-03-22 RX ADMIN — Medication 30 MILLILITER(S): at 03:08

## 2019-03-22 RX ADMIN — Medication 975 MILLIGRAM(S): at 03:07

## 2019-03-22 RX ADMIN — Medication 1 TABLET(S): at 09:43

## 2019-03-22 NOTE — ED ADULT NURSE REASSESSMENT NOTE - NS ED NURSE REASSESS COMMENT FT1
Pt states "my pain feels much better since the MAALOX." Educated pt on plan of care. Safety and comfort maintained. Awaiting CT abdomen.

## 2019-03-22 NOTE — ED ADULT NURSE NOTE - PMH
Breast cancer    CAD (coronary artery disease)    HLD (hyperlipidemia)    HTN (hypertension)    Hypothyroidism    Prostate cancer    Thyroid cancer    Varicose vein

## 2019-03-22 NOTE — ED ADULT NURSE REASSESSMENT NOTE - NS ED NURSE REASSESS COMMENT FT1
pt recived in red area pending surg consult pt verbalizes some relief of discomfort resting comfortably

## 2019-03-22 NOTE — H&P ADULT - ASSESSMENT
Carlitos Maradiaga is an 84 y.o. 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 Initial lab work and radiology consistent with gallstone pancreatitis.     Plan:  - No antibiotics at this time; no suggestion of infection (no fever, leukocytosis)    - Obtain pacemaker record to determine MRI compatibility  - GI consultation for possible ERCP  - NPO + IVF  - Continue home medications  - Plan discussed with Dr. Pedersen    **Appendix borderline on CT; exam benign; very low suspicion of appendicitis  **Hiatal and umbilical hernias noted Carlitos Maradiaga is an 84 y.o. 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 Initial lab work and radiology consistent with gallstone pancreatitis.     Plan:  - No antibiotics at this time; no suggestion of infection (no fever, leukocytosis)    - Obtain pacemaker record to determine MRI compatibility; patient's son has the pacemaker card and will bring it to the hospital later on 3/22  - GI consultation for possible ERCP  - NPO + IVF  - Continue home medications  - Plan discussed with Dr. Pedersen    **Appendix borderline on CT; exam benign; very low suspicion of appendicitis  **Hiatal and umbilical hernias noted    Omid Benjamin, PGY2  [phone]

## 2019-03-22 NOTE — CONSULT NOTE ADULT - ASSESSMENT
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, pacemaker in place     RECOMMENDATION    - trend CBC, CMP, INR  - continue volume resuscitation with IVF LR, monitor volume status closely  - please obtain an MRI with MRCP (if okay to do so with his pacemaker)  - will plan for EUS +/- ERCP on Monday, please keep NPO after midnight on Sunday  - if he decompensates or any signs of cholangitis, then will plan for ERCP sooner   - please obtain a cardiology consult for risk stratification  - recommend surgery consult for eventual cholecystectomy   - supportive care as per primary team       Gaurav Rendon, PGY-5  GI fellow  B- [phone]/ [phone]  Please call GI fellow on call after 5pm and on weekends

## 2019-03-22 NOTE — ED PROVIDER NOTE - PHYSICAL EXAMINATION
Gen: NAD, AOx3  Head: NCAT  HEENT: PERRL, oral mucosa moist, normal conjunctiva  Lung: CTAB, no respiratory distress  CV: rrr, no murmurs, Normal perfusion  Abd: soft, epigastric/RUQ tender no rebound no guarding no silver, no CVA tenderness  MSK: No edema, no visible deformities  Neuro: No focal neurologic deficits  Skin: No rash   Psych: normal affect

## 2019-03-22 NOTE — H&P ADULT - NSHPLABSRESULTS_GEN_ALL_CORE
CBC (03-22 @ 03:02)                              11.3<L>                         10.2    )----------------(  205        84.0<H>% Neutrophils, 9.6<L>% Lymphocytes, ANC: 8.6<H>                              34.0<L>                BMP (03-22 @ 03:02)             138     |  100     |  25<H> 		Ca++ --      Ca 9.5                ---------------------------------( 109<H>		Mg --                 4.1     |  26      |  1.06  			Ph --        LFTs (03-22 @ 03:02)      TPro 7.0 / Alb 3.8 / TBili 1.2 / DBili 0.7<H> / <H> / <H> / AlkPhos 341<H>      ABG (03-22 @ 03:02)      /  /  /  /  / %     Lactate:   1.3    < from: CT Abdomen and Pelvis w/ IV Cont (03.22.19 @ 05:29) >    LOWER CHEST: Subsegmental atelectasis. Cardiomegaly. Pacemaker leads.   Coronary artery calcification.     LIVER: Mildly decreased enhancement near the gallbladder fossa.  BILE DUCTS: Mild to moderate predominantly central intrahepatic biliary   dilatation. The common bile duct is dilated up to 1.2 cm.  GALLBLADDER: Innumerable gallstones. Mild gallbladder wall thickening.   Trace pericholecystic fluid..  SPLEEN: Within normal limits.  PANCREAS: Mild peripancreatic stranding.    ADRENALS: Within normal limits.  KIDNEYS/URETERS: Prominent left proximal renal collecting system.   Nonspecific mild bilateral perinephric stranding without   hydroureteronephrosis. Subcentimeter hypodense focus in the left kidney,   too small to characterize.  BLADDER: Partially distended. Prominent wall.  REPRODUCTIVE ORGANS: Borderline-enlarged prostate.    BOWEL: Small hiatal hernia. No bowel obstruction. Colon diverticulosis.   Borderline dilated, fluid-filled appendix without surrounding   inflammatory change.  PERITONEUM: No free air or drainable fluid collection.   VESSELS:  Atherosclerotic change of the abdominal aorta and its branches.  RETROPERITONEUM: No lymphadenopathy.    ABDOMINAL WALL: Small fat-containing umbilical hernia.  BONES: Osteopenia. Median sternotomy. S-shaped curvature and degenerative   changes of the spine. Age-indeterminate compression fractures with   associated vertebral height loss at T12, L3, and L4, most severe at T12.   No significant osseous retropulsion into the spinal canal.    IMPRESSION:     Suspect acute cholecystitis. Recommend clinical correlation and   additional imaging for further evaluation.     Mild to moderate intrahepatic biliary dilatation. Common bile duct   dilatation. If there is clinical suspicion for choledocholithiasis,   MRCP/MRI may be obtained for further evaluation.    Nonspecific mild peripancreatic stranding. Recommend correlation with   serum lipase to assess superimposed acute pancreatitis.    Decreased hepatic enhancement near the gallbladder fossa, which may be   reactive although hepatic extension of gallbladder inflammation cannot be   excluded.    Borderline dilated, fluid-filled appendix without surrounding   inflammatory change. Clinical question is advised to assess acute   appendicitis.     Prominent bladder wall, which may be due to partial distention. Recommend   correlation with urinalysis to assess urinary tract infection.    Age-indeterminate compression fractures of the spine. Recommend   comparison to a previous outside study to assess stability.

## 2019-03-22 NOTE — H&P ADULT - NSHPPHYSICALEXAM_GEN_ALL_CORE
General: AOx3, NAD, no scleral icterus   CV: hypertensive, regular rate  Pulm: nonlabored  Abd: Epigastric > LUQ > RUQ, negative [place], no mass, no rebound, no guarding

## 2019-03-22 NOTE — ED ADULT NURSE NOTE - OBJECTIVE STATEMENT
84 year old male pt presented to the ED co abd pain x 2 weeks getting progressively worse, pt states pain inc tonight after dinner but pain has subsided since, abd soft tender and mildly distended, denies n/v/d, states constipated which has been getting worse, lung field cta, a&ox3 pt ambulates with cane, pt states he was not passed gas today

## 2019-03-22 NOTE — ED PROVIDER NOTE - CARE PLAN
Principal Discharge DX:	Abdominal pain Principal Discharge DX:	Gallstone pancreatitis  Secondary Diagnosis:	LFT elevation

## 2019-03-22 NOTE — CONSULT NOTE ADULT - SUBJECTIVE AND OBJECTIVE BOX
Chief Complaint:  Patient is a 84y old  Male who presents with a chief complaint of Gallstone Pancreatitis (22 Mar 2019 08:27)      HPI:  84 year old male with CAD s/p MI (40 yrs ago) and CABG, pacemaker, thyroid cancer s/p thyroidectomy, breast cancer s/p left mastectomy, and prostate cancer who presented to the emergency room with chief complain of intermittent, post-prandial upper abdominal pain that became worse the night before presentation, prompting him to come into the ED.     Previous to last night, the patient states that his pain has only lasted for several hours, then resolved. However, last night, his pain started after dinner and continued until he was given pain medication in the ED. The patient complains of some associated nausea, but denies any fever, chills, vomiting, diaphoresis, constipation, or diarrhea.        Allergies:  lidocaine (Other)      Home Medications:  Incomplete Medication History as of 2014 19:24 documented in Structured Notes  · 	acetaminophen 325 mg oral tablet: 2 tab(s) orally every 6 hours, As needed, Mild Pain  · 	enalapril 5 mg oral tablet: 1 tab(s) orally 2 times a day  · 	Cardura 8 mg oral tablet: 1 tab(s) orally once a day  · 	simvastatin 10 mg oral tablet: 1 tab(s) orally once a day (at bedtime)  · 	aspirin 81 mg oral delayed release tablet: 1 tab(s) orally once a day  · 	amLODIPine 10 mg oral tablet: 1 tab(s) orally once a day  · 	levothyroxine 150 mcg (0.15 mg) oral tablet: 1 tab(s) orally once a day  · 	Multiple Vitamins oral tablet: 1 tab(s) orally once a day  · 	tamoxifen 20 mg oral tablet: 1 tab(s) orally once a day      Hospital Medications:  acetaminophen   Tablet .. 325 milliGRAM(s) Oral every 4 hours PRN  aspirin enteric coated 81 milliGRAM(s) Oral daily  dextrose 5% + sodium chloride 0.45% with potassium chloride 20 mEq/L 1000 milliLiter(s) IV Continuous <Continuous>  docusate sodium 100 milliGRAM(s) Oral three times a day  levothyroxine 150 MICROGram(s) Oral daily  losartan 50 milliGRAM(s) Oral daily  multivitamin 1 Tablet(s) Oral daily  oxyCODONE    5 mG/acetaminophen 325 mG 1 Tablet(s) Oral every 4 hours PRN  oxyCODONE    5 mG/acetaminophen 325 mG 2 Tablet(s) Oral every 6 hours PRN  senna 2 Tablet(s) Oral at bedtime PRN  simvastatin 10 milliGRAM(s) Oral at bedtime  tamsulosin 0.4 milliGRAM(s) Oral at bedtime      PMHX/PSHX:  Varicose vein  Hypothyroidism  Thyroid cancer  Breast cancer  Prostate cancer  HTN (hypertension)  HLD (hyperlipidemia)  CAD (coronary artery disease)  S/P left mastectomy  S/P thyroidectomy  S/P CABG x 2      Family history:      Social History:     ROS:     General:  No wt loss, fevers, chills, night sweats, fatigue,   Eyes:  Good vision, no reported pain  ENT:  No sore throat, pain, runny nose, dysphagia  CV:  No pain, palpitations, hypo/hypertension  Resp:  No dyspnea, cough, tachypnea, wheezing  GI:  See HPI  :  No pain, bleeding, incontinence, nocturia  Muscle:  No pain, weakness  Neuro:  No weakness, tingling, memory problems  Psych:  No fatigue, insomnia, mood problems, depression  Endocrine:  No polyuria, polydipsia, cold/heat intolerance  Heme:  No petechiae, ecchymosis, easy bruisability  Skin:  No rash, edema      PHYSICAL EXAM:     GENERAL:  Appears stated age, well-groomed, well-nourished, no distress  HEENT:  NC/AT,  conjunctivae clear and pink,  no JVD  CHEST:  Full & symmetric excursion, no increased effort, breath sounds clear  HEART:  Regular rhythm, S1, S2, no murmur/rub/S3/S4, no abdominal bruit, no edema  ABDOMEN:  Soft, non-tender, non-distended, normoactive bowel sounds,  no masses ,  EXTREMITIES:  no cyanosis,clubbing or edema  SKIN:  No rash/erythema/ecchymoses/petechiae/wounds/abscess/warm/dry  NEURO:  Alert, oriented    Vital Signs:  Vital Signs Last 24 Hrs  T(C): 37 (22 Mar 2019 12:01), Max: 37.2 (22 Mar 2019 12:01)  T(F): 98.6 (22 Mar 2019 12:01), Max: 99 (22 Mar 2019 12:01)  HR: 61 (22 Mar 2019 12:01) (61 - 84)  BP: 156/83 (22 Mar 2019 12:01) (134/83 - 177/105)  BP(mean): --  RR: 18 (22 Mar 2019 12:01) (17 - 18)  SpO2: 96% (22 Mar 2019 12:01) (95% - 97%)  Daily Height in cm: 177.8 (22 Mar 2019 00:19)    Daily     LABS:                        11.3   10.2  )-----------( 205      ( 22 Mar 2019 03:02 )             34.0         138  |  100  |  25<H>  ----------------------------<  109<H>  4.1   |  26  |  1.06    Ca    9.5      22 Mar 2019 03:02    TPro  7.0  /  Alb  3.8  /  TBili  1.2  /  DBili  0.7<H>  /  AST  537<H>  /  ALT  429<H>  /  AlkPhos  341<H>      LIVER FUNCTIONS - ( 22 Mar 2019 03:02 )  Alb: 3.8 g/dL / Pro: 7.0 g/dL / ALK PHOS: 341 U/L / ALT: 429 U/L / AST: 537 U/L / GGT: x             Urinalysis Basic - ( 22 Mar 2019 03:17 )    Color: Yellow / Appearance: Clear / S.019 / pH: x  Gluc: x / Ketone: Negative  / Bili: Negative / Urobili: Negative   Blood: x / Protein: Trace / Nitrite: Negative   Leuk Esterase: Negative / RBC: 2 /hpf / WBC 1 /HPF   Sq Epi: x / Non Sq Epi: 0 /hpf / Bacteria: Negative      Amylase Serum--      Lipase serum>530       Ammonia--      Imaging:  < from: CT Abdomen and Pelvis w/ IV Cont (19 @ 05:29) >    EXAM:  CT ABDOMEN AND PELVIS IC                            PROCEDURE DATE:  2019            INTERPRETATION:  CLINICAL INFORMATION: Postprandial epigastric and right   upper quadrant abdominal pain.      COMPARISON: None.    PROCEDURE:   CT ofthe Abdomen and Pelvis was performed with intravenous contrast.   Intravenous contrast: 90 ml Omnipaque 350. 10 ml discarded.  Oral contrast: None.  Sagittal and coronal reformats were performed.    FINDINGS:    LOWER CHEST: Subsegmental atelectasis.Cardiomegaly. Pacemaker leads.   Coronary artery calcification.     LIVER: Mildly decreased enhancement near the gallbladder fossa.  BILE DUCTS: Mild to moderate predominantly central intrahepatic biliary   dilatation. The common bile duct is dilatedup to 1.2 cm.  GALLBLADDER: Innumerable gallstones. Mild gallbladder wall thickening.   Trace pericholecystic fluid..  SPLEEN: Within normal limits.  PANCREAS: Mild peripancreatic stranding.    ADRENALS: Within normal limits.  KIDNEYS/URETERS: Prominent left proximal renal collecting system.   Nonspecific mild bilateral perinephric stranding without   hydroureteronephrosis. Subcentimeter hypodense focus in the left kidney,   too small to characterize.  BLADDER: Partially distended. Prominent wall.  REPRODUCTIVE ORGANS: Borderline-enlarged prostate.    BOWEL: Small hiatal hernia. No bowel obstruction. Colon diverticulosis.   Borderline dilated, fluid-filled appendix without surrounding   inflammatory change.  PERITONEUM: No free air or drainable fluid collection.   VESSELS:  Atherosclerotic change of the abdominal aorta and its branches.  RETROPERITONEUM: No lymphadenopathy.    ABDOMINAL WALL: Small fat-containing umbilical hernia.  BONES: Osteopenia. Median sternotomy. S-shaped curvature and degenerative   changes of the spine. Age-indeterminate compression fractures with   associated vertebral height loss at T12, L3, and L4, most severe at T12.   No significant osseous retropulsion into the spinal canal.    IMPRESSION:   Suspect acute cholecystitis. Recommend clinical correlation and additional imaging for further evaluation.   Mild to moderate intrahepatic biliary dilatation. Common bile duct dilatation. If there is clinical suspicion for choledocholithiasis, MRCP/MRI may be obtained for further evaluation.  Nonspecific mild peripancreatic stranding. Recommend correlation with serum lipase to assess superimposed acute pancreatitis.  Decreased hepatic enhancement near the gallbladder fossa, which may be reactive although hepatic extension of gallbladder inflammation cannot be excluded.  Borderline dilated, fluid-filled appendix without surrounding inflammatory change. Clinical question is advised to assess acute appendicitis.   Prominent bladder wall, which may be due to partial distention. Recommend correlation with urinalysis to assess urinary tract infection.  Age-indeterminate compression fractures of the spine. Recommend comparison to a previous outside study to assess stability.  < end of copied text > Chief Complaint:  Patient is a 84y old  Male who presents with a chief complaint of Gallstone Pancreatitis (22 Mar 2019 08:27)      HPI:  84 year old male with CAD s/p MI (40 yrs ago) and CABG, pacemaker, thyroid cancer s/p thyroidectomy, breast cancer s/p left mastectomy, and prostate cancer who presented to the emergency room with chief complain of intermittent, post-prandial upper abdominal pain that became worse the night before presentation, prompting him to come into the ED.     Previous to last night, the patient states that his pain has only lasted for several hours, then resolved. However, last night, his pain started after dinner and continued until he was given pain medication in the ED. The patient complains of some associated nausea, but denies any fever, chills, vomiting, diaphoresis, melena or hematochezia. He moves his bowels daily, brown in color, has to strain hard to have a BM.       Last colonoscopy: 6 years ago, normal (previously had a polyp, benign)  Last EGD: never    Allergies:  lidocaine (Other)      Home Medications:  Incomplete Medication History as of 2014 19:24 documented in Structured Notes  · 	acetaminophen 325 mg oral tablet: 2 tab(s) orally every 6 hours, As needed, Mild Pain  · 	enalapril 5 mg oral tablet: 1 tab(s) orally 2 times a day  · 	Cardura 8 mg oral tablet: 1 tab(s) orally once a day  · 	simvastatin 10 mg oral tablet: 1 tab(s) orally once a day (at bedtime)  · 	aspirin 81 mg oral delayed release tablet: 1 tab(s) orally once a day  · 	amLODIPine 10 mg oral tablet: 1 tab(s) orally once a day  · 	levothyroxine 150 mcg (0.15 mg) oral tablet: 1 tab(s) orally once a day  · 	Multiple Vitamins oral tablet: 1 tab(s) orally once a day  · 	tamoxifen 20 mg oral tablet: 1 tab(s) orally once a day      Hospital Medications:  acetaminophen   Tablet .. 325 milliGRAM(s) Oral every 4 hours PRN  aspirin enteric coated 81 milliGRAM(s) Oral daily  dextrose 5% + sodium chloride 0.45% with potassium chloride 20 mEq/L 1000 milliLiter(s) IV Continuous <Continuous>  docusate sodium 100 milliGRAM(s) Oral three times a day  levothyroxine 150 MICROGram(s) Oral daily  losartan 50 milliGRAM(s) Oral daily  multivitamin 1 Tablet(s) Oral daily  oxyCODONE    5 mG/acetaminophen 325 mG 1 Tablet(s) Oral every 4 hours PRN  oxyCODONE    5 mG/acetaminophen 325 mG 2 Tablet(s) Oral every 6 hours PRN  senna 2 Tablet(s) Oral at bedtime PRN  simvastatin 10 milliGRAM(s) Oral at bedtime  tamsulosin 0.4 milliGRAM(s) Oral at bedtime      PMHX/PSHX:  Varicose vein  Hypothyroidism  Thyroid cancer  Breast cancer  Prostate cancer  HTN (hypertension)  HLD (hyperlipidemia)  CAD (coronary artery disease)  S/P left mastectomy  S/P thyroidectomy  S/P CABG x 2      Social History:   Denies smoking, alcohol, drug use.       ROS:     General:  No wt loss, fevers, chills, night sweats, fatigue,   Eyes:  Good vision, no reported pain  ENT:  No sore throat, pain, runny nose, dysphagia  CV:  No pain, palpitations, hypo/hypertension  Resp:  No dyspnea, cough, tachypnea, wheezing  GI:  See HPI  :  No pain, bleeding, incontinence, nocturia  Muscle:  No pain, weakness  Neuro:  No weakness, tingling, memory problems  Psych:  No fatigue, insomnia, mood problems, depression  Endocrine:  No polyuria, polydipsia, cold/heat intolerance  Heme:  No petechiae, ecchymosis, easy bruisability  Skin:  No rash, edema      PHYSICAL EXAM:     GENERAL:  Appears stated age, well-groomed, well-nourished, no distress  HEENT:  NC/AT,  conjunctivae clear and pink,  no JVD  CHEST:  Full & symmetric excursion, no increased effort, breath sounds clear  HEART:  Regular rhythm, S1, S2, no murmur/rub/S3/S4, no abdominal bruit, no edema  ABDOMEN:  Soft, non-tender, non-distended, normoactive bowel sounds,  no masses ,  EXTREMITIES:  no cyanosis,clubbing or edema  SKIN:  No rash/erythema/ecchymoses/petechiae/wounds/abscess/warm/dry  NEURO:  Alert, oriented    Vital Signs:  Vital Signs Last 24 Hrs  T(C): 37 (22 Mar 2019 12:01), Max: 37.2 (22 Mar 2019 12:01)  T(F): 98.6 (22 Mar 2019 12:01), Max: 99 (22 Mar 2019 12:01)  HR: 61 (22 Mar 2019 12:01) (61 - 84)  BP: 156/83 (22 Mar 2019 12:01) (134/83 - 177/105)  BP(mean): --  RR: 18 (22 Mar 2019 12:01) (17 - 18)  SpO2: 96% (22 Mar 2019 12:01) (95% - 97%)  Daily Height in cm: 177.8 (22 Mar 2019 00:19)    Daily     LABS:                        11.3   10.2  )-----------( 205      ( 22 Mar 2019 03:02 )             34.0         138  |  100  |  25<H>  ----------------------------<  109<H>  4.1   |  26  |  1.06    Ca    9.5      22 Mar 2019 03:02    TPro  7.0  /  Alb  3.8  /  TBili  1.2  /  DBili  0.7<H>  /  AST  537<H>  /  ALT  429<H>  /  AlkPhos  341<H>      LIVER FUNCTIONS - ( 22 Mar 2019 03:02 )  Alb: 3.8 g/dL / Pro: 7.0 g/dL / ALK PHOS: 341 U/L / ALT: 429 U/L / AST: 537 U/L / GGT: x             Urinalysis Basic - ( 22 Mar 2019 03:17 )    Color: Yellow / Appearance: Clear / S.019 / pH: x  Gluc: x / Ketone: Negative  / Bili: Negative / Urobili: Negative   Blood: x / Protein: Trace / Nitrite: Negative   Leuk Esterase: Negative / RBC: 2 /hpf / WBC 1 /HPF   Sq Epi: x / Non Sq Epi: 0 /hpf / Bacteria: Negative      Amylase Serum--      Lipase serum>530       Ammonia--      Imaging:  < from: CT Abdomen and Pelvis w/ IV Cont (19 @ 05:29) >    EXAM:  CT ABDOMEN AND PELVIS IC                            PROCEDURE DATE:  2019            INTERPRETATION:  CLINICAL INFORMATION: Postprandial epigastric and right   upper quadrant abdominal pain.      COMPARISON: None.    PROCEDURE:   CT ofthe Abdomen and Pelvis was performed with intravenous contrast.   Intravenous contrast: 90 ml Omnipaque 350. 10 ml discarded.  Oral contrast: None.  Sagittal and coronal reformats were performed.    FINDINGS:    LOWER CHEST: Subsegmental atelectasis.Cardiomegaly. Pacemaker leads.   Coronary artery calcification.     LIVER: Mildly decreased enhancement near the gallbladder fossa.  BILE DUCTS: Mild to moderate predominantly central intrahepatic biliary   dilatation. The common bile duct is dilatedup to 1.2 cm.  GALLBLADDER: Innumerable gallstones. Mild gallbladder wall thickening.   Trace pericholecystic fluid..  SPLEEN: Within normal limits.  PANCREAS: Mild peripancreatic stranding.    ADRENALS: Within normal limits.  KIDNEYS/URETERS: Prominent left proximal renal collecting system.   Nonspecific mild bilateral perinephric stranding without   hydroureteronephrosis. Subcentimeter hypodense focus in the left kidney,   too small to characterize.  BLADDER: Partially distended. Prominent wall.  REPRODUCTIVE ORGANS: Borderline-enlarged prostate.    BOWEL: Small hiatal hernia. No bowel obstruction. Colon diverticulosis.   Borderline dilated, fluid-filled appendix without surrounding   inflammatory change.  PERITONEUM: No free air or drainable fluid collection.   VESSELS:  Atherosclerotic change of the abdominal aorta and its branches.  RETROPERITONEUM: No lymphadenopathy.    ABDOMINAL WALL: Small fat-containing umbilical hernia.  BONES: Osteopenia. Median sternotomy. S-shaped curvature and degenerative   changes of the spine. Age-indeterminate compression fractures with   associated vertebral height loss at T12, L3, and L4, most severe at T12.   No significant osseous retropulsion into the spinal canal.    IMPRESSION:   Suspect acute cholecystitis. Recommend clinical correlation and additional imaging for further evaluation.   Mild to moderate intrahepatic biliary dilatation. Common bile duct dilatation. If there is clinical suspicion for choledocholithiasis, MRCP/MRI may be obtained for further evaluation.  Nonspecific mild peripancreatic stranding. Recommend correlation with serum lipase to assess superimposed acute pancreatitis.  Decreased hepatic enhancement near the gallbladder fossa, which may be reactive although hepatic extension of gallbladder inflammation cannot be excluded.  Borderline dilated, fluid-filled appendix without surrounding inflammatory change. Clinical question is advised to assess acute appendicitis.   Prominent bladder wall, which may be due to partial distention. Recommend correlation with urinalysis to assess urinary tract infection.  Age-indeterminate compression fractures of the spine. Recommend comparison to a previous outside study to assess stability.  < end of copied text >

## 2019-03-22 NOTE — H&P ADULT - NSICDXPASTMEDICALHX_GEN_ALL_CORE_FT
PAST MEDICAL HISTORY:  Breast cancer     CAD (coronary artery disease)     HLD (hyperlipidemia)     HTN (hypertension)     Hypothyroidism     Prostate cancer     Thyroid cancer     Varicose vein

## 2019-03-22 NOTE — ED ADULT NURSE NOTE - NSIMPLEMENTINTERV_GEN_ALL_ED
Implemented All Fall Risk Interventions:  [place] to call system. Call bell, personal items and telephone within reach. Instruct patient to call for assistance. Room bathroom lighting operational. Non-slip footwear when patient is off stretcher. Physically safe environment: no spills, clutter or unnecessary equipment. Stretcher in lowest position, wheels locked, appropriate side rails in place. Provide visual cue, wrist band, yellow gown, etc. Monitor gait and stability. Monitor for mental status changes and reorient to person, place, and time. Review medications for side effects contributing to fall risk. Reinforce activity limits and safety measures with patient and family.

## 2019-03-22 NOTE — ED ADULT NURSE REASSESSMENT NOTE - NS ED NURSE REASSESS COMMENT FT1
----- Message from Gideon Son MD sent at 1/11/2018  9:59 AM EST -----  As long as it is healed, we should be able to proceed with surgery.  She has another 2 weeks, and we will see where we are at that point.  She is a last case on that Tuesday, so we can look at it when she comes in for surgery today.  However, if she is significantly concerned about it, she can come in sooner to see me.    ----- Message -----     From: Amina Khan     Sent: 1/11/2018   9:40 AM       To: Gideon Son MD    Patient was seen yesterday by her podiatrist Dr Jarrett Maurer for a blister on her toe. She has been on antibiotics which she completed on 1/5/18. Blister is still open but does not seen to be infected per Dr. Maurer. I did call his office and was told it was up to you to make the call on rescheduling patient's surgery. She is schd for 1/23/18.     Pt A&O x3. Pt assisted to BR with steady gait, UA/UC sent as per Md order. Pt c/o 8/10 pain to the epigastric area, tender upon palpation. Denies N/V. Pt medicated as per MD order. Educated pt on plan of care. Safety and comfort maintained. Awaiting CT abdomen. Pt A&O x3. Pt assisted to BR with steady gait, UA/UC sent as per MD order. Pt c/o 8/10 pain to the epigastric area, tender upon palpation. Denies N/V. Pt medicated as per MD order. Educated pt on plan of care. Safety and comfort maintained. Awaiting CT abdomen. Pt A&O x3. Pt assisted to BR with steady gait, UA/UC sent as per MD order. Pt c/o 8/10 "burning" "crampy" pain to the epigastric area, tender upon palpation. Denies N/V. Pt medicated as per MD order. Educated pt on plan of care. Safety and comfort maintained. Awaiting CT abdomen.

## 2019-03-22 NOTE — ED PROVIDER NOTE - CLINICAL SUMMARY MEDICAL DECISION MAKING FREE TEXT BOX
Ddx including gastritis/PUD, UTI/pyelo; also including mesenteric ischemia/intestinal angina although appears relatively comfortable for this; cholecystitis possible but less likely given nontoxic appearance and relatively benign exam.

## 2019-03-22 NOTE — ED PROVIDER NOTE - ATTENDING CONTRIBUTION TO CARE
84 yom pmhx noted above p/w ruq/ epig pain mod, worse in evenings after eating dinner. assoc w n/v, no f/c. no diarrhea, no back pain, no cp. states decr appetite. no sim sxs prior. no recent etoh.   states has been having pain intermittent x 2 wks    ROS:   constitutional - no fever, no chills  eyes - no visual changes, no redness  eent - no sore throat, no nasal congestion  cvs - no chest pain, no leg swelling  resp - no shortness of breath, no cough  gi - + abdominal pain, + vomiting, no diarrhea  gu - no dysuria, no hematuria  msk - no acute back pain, no joint swelling  skin - no rashes, no jaundice  neuro - no headache, no focal weakness  psych - no acute mental health issue     Physical Exam:   constitutional - well appearing, awake and alert, oriented x3  head - no external evidence of trauma  eent - no conjunctival injection or icterus, mucous membranes moist   cvs - rrr, no murmurs, no peripheral edema  resp - breath sounds clear and equal bilat, normal respiratory effort  gi - abdomen soft w mod epig and ruq tenderness, no rigidity, guarding or rebound, bowel sounds present  msk - moving all extremities spontaneously, gait is at baseline for patient  neuro - alert and oriented x3, no focal deficits, CNs 2-12 grossly intact  skin- no jaundice, warm and dry  psych - mood and affect wnl, no apparent risk to self or others     ? gallstones/ acute john vs pancreatitis vs gastritis   ct showing poss early acute john in setting of lft/ lipase elevation ? gallstone pancreatitis - will call surg and admit for poss surg/ gi eval. endorsed to Dr Cavazos at [phone] pending surg eval w plan for admission. THIERRY Copeland MD

## 2019-03-22 NOTE — ED PROVIDER NOTE - OBJECTIVE STATEMENT
Patient is 84yM with PMH hypothyroid 2/2 thyroid ca/thyroidectomy, distant breast ca s/p L mastectomy, distant prostate ca, htn, hld, cad s/p CABG presenting with nausea and abdominal pain x 2 weeks, worse tonight    PMD:  ROS: Denies fever, palpitations, chills, recent sickness, HA, vision changes, cough, SOB, chest pain, vomiting, diarrhea, constipation, dysuria, hematuria, rash, new joint aches, sick contacts, and recent travel. Patient is 84yM with PMH pacemaker for bradycardia, hypothyroid 2/2 thyroid ca/thyroidectomy, distant breast ca s/p L mastectomy, distant prostate ca, htn, hld, cad s/p CABG presenting with nausea and abdominal pain/pressure on/off x 2 weeks, worse tonight, worse after eating dinner. Son thinks he has lost 5 lbs over last 2 weeks. Denies ETOH.   No abdominal surgeries    PMD: Shay  Card: [facility]  ROS: Denies fever, palpitations, chills, recent sickness, HA, vision changes, cough, SOB, chest pain, vomiting, diarrhea, constipation, dysuria, hematuria, dark/red/sticky/tarry stools, rash, new joint aches, sick contacts, and recent travel.

## 2019-03-22 NOTE — CONSULT NOTE ADULT - ATTENDING COMMENTS
As above.    Impression:    #1.  Acute nonsevere pancreatitis, suspected gallstone etiology.  Abdominal pain currently resolved.    #2.  Abnormal LFTs    #3.  Dilated common bile duct to 1.2 cm without obvious choledocholithiasis on CT scan    #4.  s/p Pacemaker    #5.  CAD    Recommendations:    #1.  Cardiology consultation for optimization/risk stratification for possible general anesthesia for endoscopic procedures.    #2.  Follow CBC/LFTs    #3.  IV hydration    #4.  Diet as tolerated, suggest clear liquid diet then advance to low fat diet.  NPO after MN Sunday for possible EGD/EUS/ERCP on Monday 3/25/19 As above.    Impression:    #1.  Acute nonsevere pancreatitis, suspected gallstone etiology.  Abdominal pain currently resolved.    #2.  Abnormal LFTs    #3.  Dilated common bile duct to 1.2 cm without obvious choledocholithiasis on CT scan    #4.  s/p Pacemaker    #5.  CAD    Recommendations:    #1.  Cardiology consultation for optimization/risk stratification for possible general anesthesia for endoscopic procedures.    #2.  Follow CBC/LFTs    #3.  Aggressive IV hydration, suggest LR at 200 mL x 2 additional liters, then reassess.    #4.  Diet as tolerated, suggest clear liquid diet then advance to low fat diet.  NPO after MN Sunday for possible EGD/EUS/ERCP on Monday 3/25/19

## 2019-03-22 NOTE — H&P ADULT - HISTORY OF PRESENT ILLNESS
Carlitos Maradiaga is an 84 y.o. 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     Previous to last night, the patient states that his pain has only lasted for several hours, then resolved. However, last night, his pain started after dinner and continued until he was given pain medication in the ED. The patient complains of some associated nausea, but denies any fever, chills, vomiting, diaphoresis, constipation, or diarrhea.

## 2019-03-23 LAB
ALBUMIN SERPL ELPH-MCNC: 2.9 G/DL — LOW (ref 3.3–5)
ALP SERPL-CCNC: 275 U/L — HIGH (ref 40–120)
ALT FLD-CCNC: 311 U/L — HIGH (ref 10–45)
ANION GAP SERPL CALC-SCNC: 11 MMOL/L — SIGNIFICANT CHANGE UP (ref 5–17)
APTT BLD: 30.1 SEC — SIGNIFICANT CHANGE UP (ref 27.5–36.3)
AST SERPL-CCNC: 226 U/L — HIGH (ref 10–40)
BASOPHILS # BLD AUTO: 0.02 K/UL — SIGNIFICANT CHANGE UP (ref 0–0.2)
BASOPHILS NFR BLD AUTO: 0.3 % — SIGNIFICANT CHANGE UP (ref 0–2)
BILIRUB SERPL-MCNC: 0.6 MG/DL — SIGNIFICANT CHANGE UP (ref 0.2–1.2)
BLD GP AB SCN SERPL QL: NEGATIVE — SIGNIFICANT CHANGE UP
BUN SERPL-MCNC: 13 MG/DL — SIGNIFICANT CHANGE UP (ref 7–23)
CALCIUM SERPL-MCNC: 8.6 MG/DL — SIGNIFICANT CHANGE UP (ref 8.4–10.5)
CHLORIDE SERPL-SCNC: 108 MMOL/L — SIGNIFICANT CHANGE UP (ref 96–108)
CO2 SERPL-SCNC: 21 MMOL/L — LOW (ref 22–31)
CREAT SERPL-MCNC: 0.78 MG/DL — SIGNIFICANT CHANGE UP (ref 0.5–1.3)
CULTURE RESULTS: SIGNIFICANT CHANGE UP
EOSINOPHIL # BLD AUTO: 0.19 K/UL — SIGNIFICANT CHANGE UP (ref 0–0.5)
EOSINOPHIL NFR BLD AUTO: 3.1 % — SIGNIFICANT CHANGE UP (ref 0–6)
GLUCOSE SERPL-MCNC: 79 MG/DL — SIGNIFICANT CHANGE UP (ref 70–99)
HCT VFR BLD CALC: 29.6 % — LOW (ref 39–50)
HGB BLD-MCNC: 9.9 G/DL — LOW (ref 13–17)
IMM GRANULOCYTES NFR BLD AUTO: 0.3 % — SIGNIFICANT CHANGE UP (ref 0–1.5)
INR BLD: 1.3 RATIO — HIGH (ref 0.88–1.16)
LIDOCAIN IGE QN: >530 U/L — HIGH (ref 7–60)
LYMPHOCYTES # BLD AUTO: 1.43 K/UL — SIGNIFICANT CHANGE UP (ref 1–3.3)
LYMPHOCYTES # BLD AUTO: 23.1 % — SIGNIFICANT CHANGE UP (ref 13–44)
MCHC RBC-ENTMCNC: 32.8 PG — SIGNIFICANT CHANGE UP (ref 27–34)
MCHC RBC-ENTMCNC: 33.4 GM/DL — SIGNIFICANT CHANGE UP (ref 32–36)
MCV RBC AUTO: 98 FL — SIGNIFICANT CHANGE UP (ref 80–100)
MONOCYTES # BLD AUTO: 0.43 K/UL — SIGNIFICANT CHANGE UP (ref 0–0.9)
MONOCYTES NFR BLD AUTO: 7 % — SIGNIFICANT CHANGE UP (ref 2–14)
NEUTROPHILS # BLD AUTO: 4.09 K/UL — SIGNIFICANT CHANGE UP (ref 1.8–7.4)
NEUTROPHILS NFR BLD AUTO: 66.2 % — SIGNIFICANT CHANGE UP (ref 43–77)
PLATELET # BLD AUTO: 176 K/UL — SIGNIFICANT CHANGE UP (ref 150–400)
POTASSIUM SERPL-MCNC: 3.7 MMOL/L — SIGNIFICANT CHANGE UP (ref 3.5–5.3)
POTASSIUM SERPL-SCNC: 3.7 MMOL/L — SIGNIFICANT CHANGE UP (ref 3.5–5.3)
PROT SERPL-MCNC: 5.9 G/DL — LOW (ref 6–8.3)
PROTHROM AB SERPL-ACNC: 14.8 SEC — HIGH (ref 10–13.1)
RBC # BLD: 3.02 M/UL — LOW (ref 4.2–5.8)
RBC # FLD: 12.7 % — SIGNIFICANT CHANGE UP (ref 10.3–14.5)
RH IG SCN BLD-IMP: NEGATIVE — SIGNIFICANT CHANGE UP
SODIUM SERPL-SCNC: 140 MMOL/L — SIGNIFICANT CHANGE UP (ref 135–145)
SPECIMEN SOURCE: SIGNIFICANT CHANGE UP
WBC # BLD: 6.18 K/UL — SIGNIFICANT CHANGE UP (ref 3.8–10.5)
WBC # FLD AUTO: 6.18 K/UL — SIGNIFICANT CHANGE UP (ref 3.8–10.5)

## 2019-03-23 PROCEDURE — 99232 SBSQ HOSP IP/OBS MODERATE 35: CPT

## 2019-03-23 RX ORDER — POTASSIUM CHLORIDE 20 MEQ
20 PACKET (EA) ORAL ONCE
Qty: 0 | Refills: 0 | Status: COMPLETED | OUTPATIENT
Start: 2019-03-23 | End: 2019-03-23

## 2019-03-23 RX ADMIN — Medication 20 MILLIEQUIVALENT(S): at 11:31

## 2019-03-23 RX ADMIN — Medication 100 MILLIGRAM(S): at 05:25

## 2019-03-23 RX ADMIN — Medication 100 MILLIGRAM(S): at 21:03

## 2019-03-23 RX ADMIN — Medication 1 TABLET(S): at 11:31

## 2019-03-23 RX ADMIN — Medication 81 MILLIGRAM(S): at 11:31

## 2019-03-23 RX ADMIN — Medication 150 MICROGRAM(S): at 05:25

## 2019-03-23 RX ADMIN — SIMVASTATIN 10 MILLIGRAM(S): 20 TABLET, FILM COATED ORAL at 21:03

## 2019-03-23 RX ADMIN — TAMSULOSIN HYDROCHLORIDE 0.4 MILLIGRAM(S): 0.4 CAPSULE ORAL at 21:03

## 2019-03-23 RX ADMIN — Medication 100 MILLIGRAM(S): at 14:51

## 2019-03-23 RX ADMIN — LOSARTAN POTASSIUM 50 MILLIGRAM(S): 100 TABLET, FILM COATED ORAL at 05:25

## 2019-03-23 NOTE — PROGRESS NOTE ADULT - ATTENDING COMMENTS
Doing well - downtrending LAEs  Adequate re-hydration as per labs and vitals  Plan for EUS/ERCP on Monday given unable to pursue MRCP  Low thresshold for abx given reading of possible 'acute john' on CT scan - defer to primary team

## 2019-03-23 NOTE — PROGRESS NOTE ADULT - ASSESSMENT
84 year old male with CAD s/p MI (40 yrs ago) and CABG, pacemaker, thyroid cancer s/p thyroidectomy, breast cancer s/p left mastectomy, and prostate cancer who presented to the emergency room with chief complain of intermittent, post-prandial upper abdominal pain that became worse the night before presentation, prompting him to come into the ED, found to have gallstone pancreatitis.    IMPRESSION  1. Abdominal pain in the setting of gallstone pancreatitis, acute interstitial (Bisap score 2), concern for choledocholithiasis on CT A/P, however, does meet criteria for cholangitis at this time.  2. CAD s/p MI (40 yrs ago) and CABG  3. S/P PPM     RECOMMENDATIONS  - trend CBC, CMP, INR  - continue volume resuscitation with IVF LR, monitor volume status closely  - please obtain an MRI with MRCP (if okay to do so with his pacemaker)  - low threshold for IV antibiotics if patient becomes febrile or with increasing leukocytosis  - will plan for EUS +/- ERCP on Monday, please keep NPO after midnight on Sunday  - if he decompensates or any signs of cholangitis, then will plan for ERCP sooner   - cardiology consult for risk stratification  - surgery consult for eventual cholecystectomy   - rest of care per primary team     Daryl Bautista MD  Gastroenterology Fellow  Pager number: [phone] / [phone] 84 year old male with CAD s/p MI (40 yrs ago) and CABG, pacemaker, thyroid cancer s/p thyroidectomy, breast cancer s/p left mastectomy, and prostate cancer who presented to the emergency room with chief complain of intermittent, post-prandial upper abdominal pain that became worse the night before presentation, prompting him to come into the ED, found to have gallstone pancreatitis.    IMPRESSION  1. Abdominal pain in the setting of gallstone pancreatitis, acute interstitial (Bisap score 2), concern for choledocholithiasis on CT A/P, however, does meet criteria for cholangitis at this time.  2. CAD s/p MI (40 yrs ago) and CABG  3. S/P PPM     RECOMMENDATIONS  - trend CBC, CMP, INR  - continue volume resuscitation with IVF LR, monitor volume status closely  - low threshold for IV antibiotics if patient becomes febrile or with increasing leukocytosis  - will plan for EUS +/- ERCP on Monday, please keep NPO after midnight on Sunday  - if he decompensates or any signs of cholangitis, then will plan for ERCP sooner   - cardiology consult for risk stratification  - surgery consult for eventual cholecystectomy   - rest of care per primary team     Daryl Bautista MD  Gastroenterology Fellow  Pager number: [phone] / [phone]

## 2019-03-23 NOTE — PROGRESS NOTE ADULT - ASSESSMENT
Carlitos Maradiaga is an 84 y.o. 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 Initial lab work and radiology consistent with gallstone pancreatitis.     Plan:  - pacemaker record obtained, reviewed documentation, not compatible with MRI   - appreciate GI recs    - trend CBC, CMP, INR    - continue volume resuscitation with IVF LR, monitor volume status closely    - obtain an MRI with MRCP (NOT COMPATIBLE WITH PACEMAKER)    - EUS +/- ERCP on Monday, NPO after midnight on Sunday    - if decompensates or any signs of cholangitis, then will plan for ERCP sooner     - cardiology consult for risk stratification    - recommend surgery consult for eventual cholecystectomy   - supportive care as per primary team   - cardiology consultation for optimization/risk stratification for possible general anesthesia for endoscopic procedures  - Continue home medications    ACS [id number]

## 2019-03-23 NOTE — PROGRESS NOTE ADULT - SUBJECTIVE AND OBJECTIVE BOX
TRAUMA SURGERY DAILY PROGRESS NOTE    Subjective:  Patient seen and examined on morning rounds. Pain well controlled. No nausea, vomitting, diarrhea, or fevers. Afebrile overnight.       MEDICATIONS  (STANDING):  aspirin enteric coated 81 milliGRAM(s) Oral daily  dextrose 5% + sodium chloride 0.45% with potassium chloride 20 mEq/L 1000 milliLiter(s) (100 mL/Hr) IV Continuous <Continuous>  docusate sodium 100 milliGRAM(s) Oral three times a day  levothyroxine 150 MICROGram(s) Oral daily  losartan 50 milliGRAM(s) Oral daily  multivitamin 1 Tablet(s) Oral daily  simvastatin 10 milliGRAM(s) Oral at bedtime  tamsulosin 0.4 milliGRAM(s) Oral at bedtime    MEDICATIONS  (PRN):  acetaminophen   Tablet .. 325 milliGRAM(s) Oral every 4 hours PRN Mild Pain (1 - 3)  oxyCODONE    5 mG/acetaminophen 325 mG 1 Tablet(s) Oral every 4 hours PRN Moderate Pain (4 - 6)  oxyCODONE    5 mG/acetaminophen 325 mG 2 Tablet(s) Oral every 6 hours PRN Severe Pain (7 - 10)  senna 2 Tablet(s) Oral at bedtime PRN Constipation    Vital Signs Last 24 Hrs  T(C): 36.9 (23 Mar 2019 05:19), Max: 37.2 (22 Mar 2019 12:01)  T(F): 98.4 (23 Mar 2019 05:19), Max: 99 (22 Mar 2019 12:01)  HR: 71 (23 Mar 2019 05:19) (61 - 71)  BP: 161/89 (23 Mar 2019 05:19) (146/85 - 182/94)  BP(mean): --  RR: 18 (23 Mar 2019 05:19) (17 - 18)  SpO2: 97% (23 Mar 2019 05:19) (95% - 97%)  I&O's Detail    22 Mar 2019 07:01  -  23 Mar 2019 06:16  --------------------------------------------------------  IN:    dextrose 5% + sodium chloride 0.45% with potassium chloride 20 mEq/L: 1200 mL  Total IN: 1200 mL    OUT:    Voided: 1500 mL  Total OUT: 1500 mL    Total NET: -300 mL        Daily     Daily     LABS:                        11.3   10.2  )-----------( 205      ( 22 Mar 2019 03:02 )             34.0         138  |  100  |  25<H>  ----------------------------<  109<H>  4.1   |  26  |  1.06    Ca    9.5      22 Mar 2019 03:02    TPro  7.0  /  Alb  3.8  /  TBili  1.2  /  DBili  0.7<H>  /  AST  537<H>  /  ALT  429<H>  /  AlkPhos  341<H>        Urinalysis Basic - ( 22 Mar 2019 03:17 )    Color: Yellow / Appearance: Clear / S.019 / pH: x  Gluc: x / Ketone: Negative  / Bili: Negative / Urobili: Negative   Blood: x / Protein: Trace / Nitrite: Negative   Leuk Esterase: Negative / RBC: 2 /hpf / WBC 1 /HPF   Sq Epi: x / Non Sq Epi: 0 /hpf / Bacteria: Negative        Physical Exam:   GENERAL:  Appears stated age, well-groomed, well-nourished, no distress  HEENT:  NC/AT,  conjunctivae clear and pink,  no JVD  CHEST:  Full & symmetric excursion, no increased effort, breath sounds clear  HEART:  Regular rhythm, S1, S2, no murmur/rub/S3/S4, no abdominal bruit, no edema  ABDOMEN:  Soft, non-tender, non-distended, normoactive bowel sounds,  no masses  EXTREMITIES:  no cyanosis, clubbing or edema  SKIN:  No rash/erythema/ecchymoses/petechiae/wounds/abscess/warm/dry  NEURO:  Alert, oriented

## 2019-03-23 NOTE — PROGRESS NOTE ADULT - SUBJECTIVE AND OBJECTIVE BOX
Chief Complaint:  Patient is a 84y old  Male who presents with a chief complaint of Gallstone Pancreatitis (23 Mar 2019 06:15)    Interval Events:   - The patient endorses that his abdominal pain is well controlled this morning. He denies nausea/vomiting. He denies fevers/chills.    Allergies:  lidocaine (Other)    Hospital Medications:  acetaminophen   Tablet .. 325 milliGRAM(s) Oral every 4 hours PRN  aspirin enteric coated 81 milliGRAM(s) Oral daily  dextrose 5% + sodium chloride 0.45% with potassium chloride 20 mEq/L 1000 milliLiter(s) IV Continuous <Continuous>  docusate sodium 100 milliGRAM(s) Oral three times a day  levothyroxine 150 MICROGram(s) Oral daily  losartan 50 milliGRAM(s) Oral daily  multivitamin 1 Tablet(s) Oral daily  oxyCODONE    5 mG/acetaminophen 325 mG 1 Tablet(s) Oral every 4 hours PRN  oxyCODONE    5 mG/acetaminophen 325 mG 2 Tablet(s) Oral every 6 hours PRN  senna 2 Tablet(s) Oral at bedtime PRN  simvastatin 10 milliGRAM(s) Oral at bedtime  tamsulosin 0.4 milliGRAM(s) Oral at bedtime    PMHX/PSHX:  Varicose vein  Hypothyroidism  Thyroid cancer  Breast cancer  Prostate cancer  HTN (hypertension)  HLD (hyperlipidemia)  CAD (coronary artery disease)  S/P left mastectomy  S/P thyroidectomy  S/P CABG x 2    ROS:     General:  No wt loss, fevers, chills, night sweats, fatigue,   Eyes:  Good vision, no reported pain  ENT:  No sore throat, pain, runny nose, dysphagia  CV:  No pain, palpitations, hypo/hypertension  Pulm:  No dyspnea, cough, tachypnea, wheezing  GI:  + pain, No nausea, No vomiting, No diarrhea, No constipation, No weight loss, No fever, No pruritis, No rectal bleeding, No tarry stools, No dysphagia  :  No pain, bleeding, incontinence, nocturia  Skin:  No rash, tattoos, scars, edema    PHYSICAL EXAM:   Vital Signs:  Vital Signs Last 24 Hrs  T(C): 36.9 (23 Mar 2019 05:19), Max: 37.2 (22 Mar 2019 12:01)  T(F): 98.4 (23 Mar 2019 05:19), Max: 99 (22 Mar 2019 12:01)  HR: 71 (23 Mar 2019 05:19) (61 - 71)  BP: 161/89 (23 Mar 2019 05:19) (146/85 - 182/94)  BP(mean): --  RR: 18 (23 Mar 2019 05:19) (18 - 18)  SpO2: 97% (23 Mar 2019 05:19) (96% - 97%)    GENERAL:  No acute distress  HEENT:  Normocephalic/atraumatic,  no scleral icterus  CHEST:  Normal effort, no accessory muscle use  ABDOMEN:  Soft, epigastric tenderness, non-distended, normoactive bowel sounds  EXTREMITIES:  No cyanosis, clubbing, or edema  SKIN:  No rash/erythema  NEURO:  Alert and oriented x 3    LABS:                        11.3   10.2  )-----------(       ( 22 Mar 2019 03:02 )             34.0           138  |  100  |  25<H>  ----------------------------<  109<H>  4.1   |  26  |  1.06    Ca    9.5      22 Mar 2019 03:02    TPro  7.0  /  Alb  3.8  /  TBili  1.2  /  DBili  0.7<H>  /  AST  537<H>  /  ALT  429<H>  /  AlkPhos  341<H>      LIVER FUNCTIONS - ( 22 Mar 2019 03:02 )  Alb: 3.8 g/dL / Pro: 7.0 g/dL / ALK PHOS: 341 U/L / ALT: 429 U/L / AST: 537 U/L / GGT: x             Urinalysis Basic - ( 22 Mar 2019 03:17 )    Color: Yellow / Appearance: Clear / S.019 / pH: x  Gluc: x / Ketone: Negative  / Bili: Negative / Urobili: Negative   Blood: x / Protein: Trace / Nitrite: Negative   Leuk Esterase: Negative / RBC: 2 /hpf / WBC 1 /HPF   Sq Epi: x / Non Sq Epi: 0 /hpf / Bacteria: Negative               11.3   10.2  )-----------(       ( 22 Mar 2019 03:02 )             34.0     Imaging:    < from: Xray Chest 1 View AP/PA (19 @ 09:34) >      < end of copied text >  < from: Xray Chest 1 View AP/PA (19 @ 09:34) >    EXAM:  XR CHEST AP OR PA 1V                            PROCEDURE DATE:  2019            INTERPRETATION:  EXAMINATION: XR CHEST AP OR PA 1V    CLINICAL INDICATION: Preoperative x-ray.    TECHNIQUE: Single portable view of the chest was obtained.    COMPARISON: Chest x-ray 2014.    FINDINGS:     Right-sided dual-chamber pacemaker is again noted. Status post sternotomy.    The cardiac silhouette is enlarged.     There are no focal pulmonary consolidations. No pneumothorax.    No acute osseous abnormalities.    IMPRESSION:   No focal consolidation.                LAW VILLARREAL M.D., RADIOLGY RESIDENT  This document has been electronically signed.  LAITH FANG M.D., ATTENDING RADIOLOGIST  This document has been electronically signed. Mar 22 2019 11:59AM                < end of copied text >

## 2019-03-24 LAB
ALBUMIN SERPL ELPH-MCNC: 2.9 G/DL — LOW (ref 3.3–5)
ALP SERPL-CCNC: 229 U/L — HIGH (ref 40–120)
ALT FLD-CCNC: 216 U/L — HIGH (ref 10–45)
ANION GAP SERPL CALC-SCNC: 11 MMOL/L — SIGNIFICANT CHANGE UP (ref 5–17)
APTT BLD: 30.7 SEC — SIGNIFICANT CHANGE UP (ref 27.5–36.3)
AST SERPL-CCNC: 89 U/L — HIGH (ref 10–40)
BASOPHILS # BLD AUTO: 0.02 K/UL — SIGNIFICANT CHANGE UP (ref 0–0.2)
BASOPHILS NFR BLD AUTO: 0.3 % — SIGNIFICANT CHANGE UP (ref 0–2)
BILIRUB SERPL-MCNC: 0.4 MG/DL — SIGNIFICANT CHANGE UP (ref 0.2–1.2)
BUN SERPL-MCNC: 10 MG/DL — SIGNIFICANT CHANGE UP (ref 7–23)
CALCIUM SERPL-MCNC: 8.6 MG/DL — SIGNIFICANT CHANGE UP (ref 8.4–10.5)
CHLORIDE SERPL-SCNC: 107 MMOL/L — SIGNIFICANT CHANGE UP (ref 96–108)
CO2 SERPL-SCNC: 20 MMOL/L — LOW (ref 22–31)
CREAT SERPL-MCNC: 0.77 MG/DL — SIGNIFICANT CHANGE UP (ref 0.5–1.3)
EOSINOPHIL # BLD AUTO: 0.23 K/UL — SIGNIFICANT CHANGE UP (ref 0–0.5)
EOSINOPHIL NFR BLD AUTO: 3.4 % — SIGNIFICANT CHANGE UP (ref 0–6)
GLUCOSE SERPL-MCNC: 90 MG/DL — SIGNIFICANT CHANGE UP (ref 70–99)
HCT VFR BLD CALC: 30.7 % — LOW (ref 39–50)
HGB BLD-MCNC: 10.4 G/DL — LOW (ref 13–17)
IMM GRANULOCYTES NFR BLD AUTO: 0.4 % — SIGNIFICANT CHANGE UP (ref 0–1.5)
INR BLD: 1.17 RATIO — HIGH (ref 0.88–1.16)
LIDOCAIN IGE QN: 113 U/L — HIGH (ref 7–60)
LYMPHOCYTES # BLD AUTO: 1.57 K/UL — SIGNIFICANT CHANGE UP (ref 1–3.3)
LYMPHOCYTES # BLD AUTO: 23.1 % — SIGNIFICANT CHANGE UP (ref 13–44)
MCHC RBC-ENTMCNC: 33.3 PG — SIGNIFICANT CHANGE UP (ref 27–34)
MCHC RBC-ENTMCNC: 33.9 GM/DL — SIGNIFICANT CHANGE UP (ref 32–36)
MCV RBC AUTO: 98.4 FL — SIGNIFICANT CHANGE UP (ref 80–100)
MONOCYTES # BLD AUTO: 0.52 K/UL — SIGNIFICANT CHANGE UP (ref 0–0.9)
MONOCYTES NFR BLD AUTO: 7.6 % — SIGNIFICANT CHANGE UP (ref 2–14)
NEUTROPHILS # BLD AUTO: 4.43 K/UL — SIGNIFICANT CHANGE UP (ref 1.8–7.4)
NEUTROPHILS NFR BLD AUTO: 65.2 % — SIGNIFICANT CHANGE UP (ref 43–77)
PLATELET # BLD AUTO: 179 K/UL — SIGNIFICANT CHANGE UP (ref 150–400)
POTASSIUM SERPL-MCNC: 4 MMOL/L — SIGNIFICANT CHANGE UP (ref 3.5–5.3)
POTASSIUM SERPL-SCNC: 4 MMOL/L — SIGNIFICANT CHANGE UP (ref 3.5–5.3)
PROT SERPL-MCNC: 5.7 G/DL — LOW (ref 6–8.3)
PROTHROM AB SERPL-ACNC: 13.5 SEC — HIGH (ref 10–13.1)
RBC # BLD: 3.12 M/UL — LOW (ref 4.2–5.8)
RBC # FLD: 12.8 % — SIGNIFICANT CHANGE UP (ref 10.3–14.5)
SODIUM SERPL-SCNC: 138 MMOL/L — SIGNIFICANT CHANGE UP (ref 135–145)
WBC # BLD: 6.8 K/UL — SIGNIFICANT CHANGE UP (ref 3.8–10.5)
WBC # FLD AUTO: 6.8 K/UL — SIGNIFICANT CHANGE UP (ref 3.8–10.5)

## 2019-03-24 PROCEDURE — 99223 1ST HOSP IP/OBS HIGH 75: CPT | Mod: GC

## 2019-03-24 PROCEDURE — 99232 SBSQ HOSP IP/OBS MODERATE 35: CPT

## 2019-03-24 RX ORDER — ENOXAPARIN SODIUM 100 MG/ML
40 INJECTION SUBCUTANEOUS DAILY
Qty: 0 | Refills: 0 | Status: DISCONTINUED | OUTPATIENT
Start: 2019-03-24 | End: 2019-03-27

## 2019-03-24 RX ADMIN — Medication 100 MILLIGRAM(S): at 20:15

## 2019-03-24 RX ADMIN — SIMVASTATIN 10 MILLIGRAM(S): 20 TABLET, FILM COATED ORAL at 22:30

## 2019-03-24 RX ADMIN — ENOXAPARIN SODIUM 40 MILLIGRAM(S): 100 INJECTION SUBCUTANEOUS at 20:14

## 2019-03-24 RX ADMIN — DEXTROSE MONOHYDRATE, SODIUM CHLORIDE, AND POTASSIUM CHLORIDE 100 MILLILITER(S): 50; .745; 4.5 INJECTION, SOLUTION INTRAVENOUS at 12:21

## 2019-03-24 RX ADMIN — Medication 100 MILLIGRAM(S): at 05:14

## 2019-03-24 RX ADMIN — Medication 150 MICROGRAM(S): at 05:14

## 2019-03-24 RX ADMIN — TAMSULOSIN HYDROCHLORIDE 0.4 MILLIGRAM(S): 0.4 CAPSULE ORAL at 22:30

## 2019-03-24 RX ADMIN — Medication 81 MILLIGRAM(S): at 12:19

## 2019-03-24 RX ADMIN — LOSARTAN POTASSIUM 50 MILLIGRAM(S): 100 TABLET, FILM COATED ORAL at 05:14

## 2019-03-24 RX ADMIN — Medication 1 TABLET(S): at 12:19

## 2019-03-24 NOTE — CONSULT NOTE ADULT - ASSESSMENT
Pt is an 84M h/o MI s/p CABG (40 years ago), PPM placed for "fainting, slow heart rate", thyroid cancer s/p thyroidectomy, breast cancer s/p left mastectomy, and prostate cancer p/w Gallstone pancreatitis. Cards consulted for risk stratification/optimization prior to OR.    RCRI- Class II  Gamboa score 0.7% of MI intraoperatively   Keep K>4.0, Mg>2.0    Reese Renner PAC [id number] Pt is an 84M h/o MI s/p CABG (40 years ago), PPM placed for "fainting, slow heart rate", thyroid cancer s/p thyroidectomy, breast cancer s/p left mastectomy, and prostate cancer p/w Gallstone pancreatitis. Cards consulted for risk stratification/optimization prior to OR.    RCRI- Class II  Gamboa score 0.7% of MI intraoperatively   Patient able to climb 3 flights of stairs at baseline  Keep K>4.0, Mg>2.0    Reese Renner PAC [id number] Pt is an 84M h/o MI s/p CABG (40 years ago), PPM placed for "fainting, slow heart rate", thyroid cancer s/p thyroidectomy, breast cancer s/p left mastectomy, and prostate cancer p/w Gallstone pancreatitis. Cards consulted for risk stratification/optimization prior to OR.    RCRI- Class II  Gamboa score 0.7% of MI intraoperatively   Patient able to climb 3 flights of stairs at baseline  Presently optimized for EUS/ERCP    Reese Renner PAC [id number]

## 2019-03-24 NOTE — PROGRESS NOTE ADULT - ASSESSMENT
Carlitos Maradiaga is an 84 y.o. 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 Initial lab work and radiology consistent with gallstone pancreatitis.     Plan:  - pacemaker record obtained, reviewed documentation, not compatible with MRI   - appreciate GI recs    - trend CBC, CMP, INR    - continue volume resuscitation with IVF LR, monitor volume status closely    - obtain an MRI with MRCP >> NOT COMPATIBLE WITH PACEMAKER    - EUS +/- ERCP on Monday, NPO after midnight on Sunday    - if decompensates or any signs of cholangitis, then will plan for ERCP sooner     - cardiology consultation for optimization/risk stratification for possible general anesthesia for endoscopic procedures     - eventual cholecystectomy   - cardiology clearance    - GI requesting cardiac clearance and risk stratification for EUS w/ possible ERCP on Monday    - contacted patient's personal cardiologist Art Underwood MD, MBBS [phone] for clearance. May have [facility] privileges     - Dr. Brewster of house cardiology aware. Advised to contact house cardiology in AM if no response.     - contact house cardiology if patient's cardiologist does not respond in the AM  - Continue home medications    ACS [id number]

## 2019-03-24 NOTE — CONSULT NOTE ADULT - SUBJECTIVE AND OBJECTIVE BOX
====================  HISTORY OF PRESENT ILLNESS  ====================  HPI:  Carlitos Maradiaga is an 84 y.o. 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     Previous to last night, the patient states that his pain has only lasted for several hours, then resolved. However, last night, his pain started after dinner and continued until he was given pain medication in the ED. The patient complains of some associated nausea, but denies any fever, chills, vomiting, diaphoresis, constipation, or diarrhea. (22 Mar 2019 08:27)        ====================  PMH:   ====================  Varicose vein  Hypothyroidism  Thyroid cancer  Breast cancer  Prostate cancer  HTN (hypertension)  HLD (hyperlipidemia)  CAD (coronary artery disease)        ====================  PSH:   ====================  S/P left mastectomy  S/P thyroidectomy  S/P CABG x 2        ====================  MEDICATIONS:  ====================  acetaminophen   Tablet .. 325 milliGRAM(s) Oral every 4 hours PRN  aspirin enteric coated 81 milliGRAM(s) Oral daily  dextrose 5% + sodium chloride 0.45% with potassium chloride 20 mEq/L 1000 milliLiter(s) IV Continuous <Continuous>  docusate sodium 100 milliGRAM(s) Oral three times a day  enoxaparin Injectable 40 milliGRAM(s) SubCutaneous daily  levothyroxine 150 MICROGram(s) Oral daily  losartan 50 milliGRAM(s) Oral daily  multivitamin 1 Tablet(s) Oral daily  oxyCODONE    5 mG/acetaminophen 325 mG 1 Tablet(s) Oral every 4 hours PRN  oxyCODONE    5 mG/acetaminophen 325 mG 2 Tablet(s) Oral every 6 hours PRN  senna 2 Tablet(s) Oral at bedtime PRN  simvastatin 10 milliGRAM(s) Oral at bedtime  tamsulosin 0.4 milliGRAM(s) Oral at bedtime        ====================  ALLERGIES:  ====================  lidocaine (Other)        ====================  FAMILY AND SOCIAL HISTORY  ====================  FAMILY HISTORY:    Social History:  Smoking:  Alcohol:  Drugs:      ====================  REVIEW OF SYSTEMS:  ====================  Constitutional: [-] Fever, [-] Chills, [-] NIght Sweats, [-] Fatigue, [-]Weight change   HEENT: [-] Blurred vision, [-] Headache  Respiratory: [-] Cough, [-] Wheezing, [-] Shortness of breath  Cardiovascular: [-] Chest Pain, [-] Palpitations, [-] HILL  Gastrointestinal: [-] Abdominal Pain, [-] Diarrhea, [-] Constipation, [-] Nausea, [-] Vomiting  Extremities: [-] Swelling, [-] Edema, [-] Joint Pain  Neurologic: [-] Focal deficit, [-] Paresthesias, [-] Syncope  Skin: [-] Rash, [-] Ecchymoses, [-] Wounds, [-] Lesions  [ ]Unable to obtain      ====================  PHYSICIAL EXAM:  ====================  T(C): 36.6 (03-24-19 @ 14:16), Max: 37.2 (03-23-19 @ 21:40)  HR: 78 (03-24-19 @ 14:16) (63 - 78)  BP: 162/90 (03-24-19 @ 14:16) (149/84 - 168/88)  RR: 18 (03-24-19 @ 14:16) (18 - 18)  SpO2: 96% (03-24-19 @ 14:16) (95% - 98%)  Wt(kg): --    03-23 @ 07:01  -  03-24 @ 07:00  --------------------------------------------------------  IN: 2890 mL / OUT: 2175 mL / NET: 715 mL    03-24 @ 07:01  -  03-24 @ 15:14  --------------------------------------------------------  IN: 0 mL / OUT: 1050 mL / NET: -1050 mL      Daily     Daily     Appearance: Normal, NAD  Eyes: PERRL, EOMI  HENT: Normal oral mus[place]a, NC/AT  Cardiovascular:  S1/S2, RRR, No edema, JVP, Murmur  Respiratory: Clear to auscultation bilaterally  Gastrointestinal:  Soft, Non-tender, Non-distended, BS+  Musculoskeletal:  No clubbing [ ] No joint deformity   Neurologic: Non-focal  Lymphatic: No lymphadenopathy  Psychiatry: AAOx3, Mood & affect appropriate  Skin: No rashes, No ecchymoses, No cyanosis    ====================  DIAGNOSTIC TESTING:  ====================  Interpretation of Telemetry:    ECG:    Echo:    Stress Testing:    Cath:    Imaging:      ====================  LABS:  ====================                        10.4   6.80  )-----------( 179      ( 24 Mar 2019 10:01 )             30.7     03-24    138  |  107  |  10  ----------------------------<  90  4.0   |  20<L>  |  0.77    Ca    8.6      24 Mar 2019 07:17    TPro  5.7<L>  /  Alb  2.9<L>  /  TBili  0.4  /  DBili  x   /  AST  89<H>  /  ALT  216<H>  /  AlkPhos  229<H>  03-24    PT/INR - ( 24 Mar 2019 09:22 )   PT: 13.5 sec;   INR: 1.17 ratio         PTT - ( 24 Mar 2019 09:22 )  PTT:30.7 sec ====================  HISTORY OF PRESENT ILLNESS  ====================  HPI:  Carlitos Maradiaga is an 84 y.o. 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     Previous to last night, the patient states that his pain has only lasted for several hours, then resolved. However, last night, his pain started after dinner and continued until he was given pain medication in the ED. The patient complains of some associated nausea, but denies any fever, chills, vomiting, diaphoresis, constipation, or diarrhea. (22 Mar 2019 08:27)    Cardiology consulted for surgical risk stratification     ====================  PMH:   ====================  Varicose vein  Hypothyroidism  Thyroid cancer  Breast cancer  Prostate cancer  HTN (hypertension)  HLD (hyperlipidemia)  CAD (coronary artery disease)        ====================  PSH:   ====================  S/P left mastectomy  S/P thyroidectomy  S/P CABG x 2        ====================  MEDICATIONS:  ====================  acetaminophen   Tablet .. 325 milliGRAM(s) Oral every 4 hours PRN  aspirin enteric coated 81 milliGRAM(s) Oral daily  dextrose 5% + sodium chloride 0.45% with potassium chloride 20 mEq/L 1000 milliLiter(s) IV Continuous <Continuous>  docusate sodium 100 milliGRAM(s) Oral three times a day  enoxaparin Injectable 40 milliGRAM(s) SubCutaneous daily  levothyroxine 150 MICROGram(s) Oral daily  losartan 50 milliGRAM(s) Oral daily  multivitamin 1 Tablet(s) Oral daily  oxyCODONE    5 mG/acetaminophen 325 mG 1 Tablet(s) Oral every 4 hours PRN  oxyCODONE    5 mG/acetaminophen 325 mG 2 Tablet(s) Oral every 6 hours PRN  senna 2 Tablet(s) Oral at bedtime PRN  simvastatin 10 milliGRAM(s) Oral at bedtime  tamsulosin 0.4 milliGRAM(s) Oral at bedtime        ====================  ALLERGIES:  ====================  lidocaine (Other)        ====================  REVIEW OF SYSTEMS:  ====================  Constitutional: [-] Fever, [-] Chills, [-] NIght Sweats, [-] Fatigue, [-]Weight change   HEENT: [-] Blurred vision, [-] Headache  Respiratory: [-] Cough, [-] Wheezing, [-] Shortness of breath  Cardiovascular: [-] Chest Pain, [-] Palpitations, [-] HILL  Gastrointestinal: [x] Abdominal Pain, [-] Diarrhea, [-] Constipation, [-] Nausea, [-] Vomiting  Extremities: [-] Swelling, [-] Edema, [-] Joint Pain  Neurologic: [-] Focal deficit, [-] Paresthesias, [-] Syncope  Skin: [-] Rash, [-] Ecchymoses, [-] Wounds, [-] Lesions  [ ]Unable to obtain      ====================  PHYSICIAL EXAM:  ====================  T(C): 36.6 (03-24-19 @ 14:16), Max: 37.2 (03-23-19 @ 21:40)  HR: 78 (03-24-19 @ 14:16) (63 - 78)  BP: 162/90 (03-24-19 @ 14:16) (149/84 - 168/88)  RR: 18 (03-24-19 @ 14:16) (18 - 18)  SpO2: 96% (03-24-19 @ 14:16) (95% - 98%)  Wt(kg): --    03-23 @ 07:01 - 03-24 @ 07:00  --------------------------------------------------------  IN: 2890 mL / OUT: 2175 mL / NET: 715 mL    03-24 @ 07:01  -  03-24 @ 15:14  --------------------------------------------------------  IN: 0 mL / OUT: 1050 mL / NET: -1050 mL      Daily     Daily     Appearance: Normal, NAD  Eyes: PERRL, EOMI  HENT: Normal oral [place], NC/AT  Cardiovascular:  S1/S2, RRR, No edema, JVP, Murmur  Respiratory: Clear to auscultation bilaterally  Gastrointestinal:  Soft, Non-tender, Non-distended, BS+  Musculoskeletal:  No clubbing [ ] No joint deformity   Neurologic: Non-focal  Lymphatic: No lymphadenopathy  Psychiatry: AAOx3, Mood & affect appropriate  Skin: No rashes, No ecchymoses, No cyanosis    ====================  DIAGNOSTIC TESTING:  ====================  Interpretation of Telemetry:    ECG: Paced rhythm       ====================  LABS:  ====================                        10.4   6.80  )-----------( 179      ( 24 Mar 2019 10:01 )             30.7     03-24    138  |  107  |  10  ----------------------------<  90  4.0   |  20<L>  |  0.77    Ca    8.6      24 Mar 2019 07:17    TPro  5.7<L>  /  Alb  2.9<L>  /  TBili  0.4  /  DBili  x   /  AST  89<H>  /  ALT  216<H>  /  AlkPhos  229<H>  03-24    PT/INR - ( 24 Mar 2019 09:22 )   PT: 13.5 sec;   INR: 1.17 ratio         PTT - ( 24 Mar 2019 09:22 )  PTT:30.7 sec

## 2019-03-24 NOTE — PROGRESS NOTE ADULT - ASSESSMENT
84 year old male with CAD s/p MI (40 yrs ago) and CABG, pacemaker, thyroid cancer s/p thyroidectomy, breast cancer s/p left mastectomy, and prostate cancer who presented to the emergency room with chief complain of intermittent, post-prandial upper abdominal pain that became worse the night before presentation, prompting him to come into the ED, found to have gallstone pancreatitis.    IMPRESSION  1. Abdominal pain in the setting of gallstone pancreatitis, acute interstitial (Bisap score 2), concern for choledocholithiasis on CT A/P, however, does meet criteria for cholangitis at this time.  2. CAD s/p MI (40 yrs ago) and CABG  3. S/P PPM     RECOMMENDATIONS  - trend CBC, CMP, INR  - continue volume resuscitation with IVF LR, monitor volume status closely  - low threshold for IV antibiotics if patient becomes febrile or with increasing leukocytosis  - will plan for EUS +/- ERCP on Monday, please keep NPO after midnight on Sunday  - if he decompensates or any signs of cholangitis, then will plan for ERCP sooner   - cardiology consult for risk stratification  - surgery consult for eventual cholecystectomy   - rest of care per primary team     Daryl Bautista MD  Gastroenterology Fellow  Pager number: [phone] / [phone]

## 2019-03-24 NOTE — PROGRESS NOTE ADULT - SUBJECTIVE AND OBJECTIVE BOX
TRAUMA SURGERY DAILY PROGRESS NOTE    Subjective:  Patient seen and examined on morning rounds. Pain well controlled. No nausea, vomitting, diarrhea, or fevers. Afebrile overnight.     Vital Signs Last 24 Hrs  T(C): 37.2 (23 Mar 2019 21:40), Max: 37.2 (23 Mar 2019 21:40)  T(F): 99 (23 Mar 2019 21:40), Max: 99 (23 Mar 2019 21:40)  HR: 68 (23 Mar 2019 21:40) (63 - 71)  BP: 168/88 (23 Mar 2019 21:40) (146/85 - 175/80)  BP(mean): --  RR: 18 (23 Mar 2019 21:40) (18 - 18)  SpO2: 98% (23 Mar 2019 21:40) (95% - 98%)    03-22-19 @ 07:01  -  03-23-19 @ 07:00  --------------------------------------------------------  IN: 1200 mL / OUT: 1500 mL / NET: -300 mL    03-23-19 @ 07:01  -  03-24-19 @ 00:53  --------------------------------------------------------  IN: 1440 mL / OUT: 1575 mL / NET: -135 mL      MEDICATIONS  (STANDING):  aspirin enteric coated 81 milliGRAM(s) Oral daily  dextrose 5% + sodium chloride 0.45% with potassium chloride 20 mEq/L 1000 milliLiter(s) (100 mL/Hr) IV Continuous <Continuous>  docusate sodium 100 milliGRAM(s) Oral three times a day  levothyroxine 150 MICROGram(s) Oral daily  losartan 50 milliGRAM(s) Oral daily  multivitamin 1 Tablet(s) Oral daily  simvastatin 10 milliGRAM(s) Oral at bedtime  tamsulosin 0.4 milliGRAM(s) Oral at bedtime    MEDICATIONS  (PRN):  acetaminophen   Tablet .. 325 milliGRAM(s) Oral every 4 hours PRN Mild Pain (1 - 3)  oxyCODONE    5 mG/acetaminophen 325 mG 1 Tablet(s) Oral every 4 hours PRN Moderate Pain (4 - 6)  oxyCODONE    5 mG/acetaminophen 325 mG 2 Tablet(s) Oral every 6 hours PRN Severe Pain (7 - 10)  senna 2 Tablet(s) Oral at bedtime PRN Constipation      LABS:                        9.9    6.18  )-----------( 176      ( 23 Mar 2019 09:50 )             29.6     03-23    140  |  108  |  13  ----------------------------<  79  3.7   |  21<L>  |  0.78    Ca    8.6      23 Mar 2019 07:26    TPro  5.9<L>  /  Alb  2.9<L>  /  TBili  0.6  /  DBili  x   /  AST  226<H>  /  ALT  311<H>  /  AlkPhos  275<H>  03-23      Physical Exam:   GENERAL:  Appears stated age, well-groomed, well-nourished, no distress  HEENT:  NC/AT,  conjunctivae clear and pink,  no JVD  CHEST:  Full & symmetric excursion, no increased effort, breath sounds clear  HEART:  Regular rhythm, S1, S2, no murmur/rub/S3/S4, no abdominal bruit, no edema  ABDOMEN:  Soft, non-tender, non-distended, normoactive bowel sounds,  no masses  EXTREMITIES:  no cyanosis, clubbing or edema  SKIN:  No rash/erythema/ecchymoses/petechiae/wounds/abscess/warm/dry  NEURO:  Alert, oriented

## 2019-03-24 NOTE — PROGRESS NOTE ADULT - SUBJECTIVE AND OBJECTIVE BOX
Chief Complaint: Abdominal pain    Interval Events:   - The patient denies abdominal pain and nausea/vomiting this morning.    Allergies:  lidocaine (Other)    Hospital Medications:  acetaminophen   Tablet .. 325 milliGRAM(s) Oral every 4 hours PRN  aspirin enteric coated 81 milliGRAM(s) Oral daily  dextrose 5% + sodium chloride 0.45% with potassium chloride 20 mEq/L 1000 milliLiter(s) IV Continuous <Continuous>  docusate sodium 100 milliGRAM(s) Oral three times a day  enoxaparin Injectable 40 milliGRAM(s) SubCutaneous daily  levothyroxine 150 MICROGram(s) Oral daily  losartan 50 milliGRAM(s) Oral daily  multivitamin 1 Tablet(s) Oral daily  oxyCODONE    5 mG/acetaminophen 325 mG 1 Tablet(s) Oral every 4 hours PRN  oxyCODONE    5 mG/acetaminophen 325 mG 2 Tablet(s) Oral every 6 hours PRN  senna 2 Tablet(s) Oral at bedtime PRN  simvastatin 10 milliGRAM(s) Oral at bedtime  tamsulosin 0.4 milliGRAM(s) Oral at bedtime    PMHX/PSHX:  Varicose vein  Hypothyroidism  Thyroid cancer  Breast cancer  Prostate cancer  HTN (hypertension)  HLD (hyperlipidemia)  CAD (coronary artery disease)  S/P left mastectomy  S/P thyroidectomy  S/P CABG x 2    Family history:      ROS:     General:  No wt loss, fevers, chills, night sweats, fatigue,   Eyes:  Good vision, no reported pain  ENT:  No sore throat, pain, runny nose, dysphagia  CV:  No pain, palpitations, hypo/hypertension  Pulm:  No dyspnea, cough, tachypnea, wheezing  GI:  No pain, No nausea, No vomiting, No diarrhea, No constipation, No weight loss, No fever, No pruritis, No rectal bleeding, No tarry stools, No dysphagia  :  No pain, bleeding, incontinence, nocturia  Skin:  No rash, tattoos, scars, edema    PHYSICAL EXAM:   Vital Signs:  Vital Signs Last 24 Hrs  T(C): 36.8 (24 Mar 2019 09:48), Max: 37.2 (23 Mar 2019 21:40)  T(F): 98.3 (24 Mar 2019 09:48), Max: 99 (23 Mar 2019 21:40)  HR: 65 (24 Mar 2019 09:48) (63 - 68)  BP: 163/90 (24 Mar 2019 09:48) (149/84 - 175/80)  BP(mean): --  RR: 18 (24 Mar 2019 09:48) (18 - 18)  SpO2: 97% (24 Mar 2019 09:48) (95% - 98%)    GENERAL:  No acute distress  HEENT:  Normocephalic/atraumatic,  no scleral icterus  CHEST:  Normal effort, no accessory muscle use  ABDOMEN:  Soft, non-tender, non-distended, normoactive bowel sounds  EXTREMITIES:  No cyanosis, clubbing, or edema  SKIN:  No rash/erythema  NEURO:  Alert and oriented x 3    LABS:                        10.4   6.80  )-----------( 179      ( 24 Mar 2019 10:01 )             30.7     Mean Cell Volume: 98.4 fl (03-24-19 @ 10:01)    03-24    138  |  107  |  10  ----------------------------<  90  4.0   |  20<L>  |  0.77    Ca    8.6      24 Mar 2019 07:17    TPro  5.7<L>  /  Alb  2.9<L>  /  TBili  0.4  /  DBili  x   /  AST  89<H>  /  ALT  216<H>  /  AlkPhos  229<H>  03-24    LIVER FUNCTIONS - ( 24 Mar 2019 07:17 )  Alb: 2.9 g/dL / Pro: 5.7 g/dL / ALK PHOS: 229 U/L / ALT: 216 U/L / AST: 89 U/L / GGT: x           PT/INR - ( 24 Mar 2019 09:22 )   PT: 13.5 sec;   INR: 1.17 ratio         PTT - ( 24 Mar 2019 09:22 )  PTT:30.7 sec    Amylase Serum--      Lipase [id number]       Ammonia--                          10.4   6.80  )-----------( 179      ( 24 Mar 2019 10:01 )             30.7                         9.9    6.18  )-----------( 176      ( 23 Mar 2019 09:50 )             29.6                         11.3   10.2  )-----------( 205      ( 22 Mar 2019 03:02 )             34.0     Imaging:    No new imaging

## 2019-03-25 ENCOUNTER — RESULT REVIEW (OUTPATIENT)
Age: 84
End: 2019-03-25

## 2019-03-25 LAB
ALBUMIN SERPL ELPH-MCNC: 3 G/DL — LOW (ref 3.3–5)
ALP SERPL-CCNC: 205 U/L — HIGH (ref 40–120)
ALT FLD-CCNC: 164 U/L — HIGH (ref 10–45)
ANION GAP SERPL CALC-SCNC: 9 MMOL/L — SIGNIFICANT CHANGE UP (ref 5–17)
AST SERPL-CCNC: 58 U/L — HIGH (ref 10–40)
BILIRUB DIRECT SERPL-MCNC: 0.1 MG/DL — SIGNIFICANT CHANGE UP (ref 0–0.2)
BILIRUB INDIRECT FLD-MCNC: 0.3 MG/DL — SIGNIFICANT CHANGE UP (ref 0.2–1)
BILIRUB SERPL-MCNC: 0.4 MG/DL — SIGNIFICANT CHANGE UP (ref 0.2–1.2)
BLD GP AB SCN SERPL QL: NEGATIVE — SIGNIFICANT CHANGE UP
BUN SERPL-MCNC: 7 MG/DL — SIGNIFICANT CHANGE UP (ref 7–23)
CALCIUM SERPL-MCNC: 8.6 MG/DL — SIGNIFICANT CHANGE UP (ref 8.4–10.5)
CHLORIDE SERPL-SCNC: 106 MMOL/L — SIGNIFICANT CHANGE UP (ref 96–108)
CO2 SERPL-SCNC: 22 MMOL/L — SIGNIFICANT CHANGE UP (ref 22–31)
CREAT SERPL-MCNC: 0.82 MG/DL — SIGNIFICANT CHANGE UP (ref 0.5–1.3)
GLUCOSE SERPL-MCNC: 153 MG/DL — HIGH (ref 70–99)
HCT VFR BLD CALC: 30 % — LOW (ref 39–50)
HGB BLD-MCNC: 10 G/DL — LOW (ref 13–17)
MAGNESIUM SERPL-MCNC: 1.7 MG/DL — SIGNIFICANT CHANGE UP (ref 1.6–2.6)
MCHC RBC-ENTMCNC: 32.7 PG — SIGNIFICANT CHANGE UP (ref 27–34)
MCHC RBC-ENTMCNC: 33.3 GM/DL — SIGNIFICANT CHANGE UP (ref 32–36)
MCV RBC AUTO: 98 FL — SIGNIFICANT CHANGE UP (ref 80–100)
PHOSPHATE SERPL-MCNC: 2.5 MG/DL — SIGNIFICANT CHANGE UP (ref 2.5–4.5)
PLATELET # BLD AUTO: 197 K/UL — SIGNIFICANT CHANGE UP (ref 150–400)
POTASSIUM SERPL-MCNC: 4.2 MMOL/L — SIGNIFICANT CHANGE UP (ref 3.5–5.3)
POTASSIUM SERPL-SCNC: 4.2 MMOL/L — SIGNIFICANT CHANGE UP (ref 3.5–5.3)
PROT SERPL-MCNC: 5.7 G/DL — LOW (ref 6–8.3)
RBC # BLD: 3.06 M/UL — LOW (ref 4.2–5.8)
RBC # FLD: 12.6 % — SIGNIFICANT CHANGE UP (ref 10.3–14.5)
RH IG SCN BLD-IMP: NEGATIVE — SIGNIFICANT CHANGE UP
SODIUM SERPL-SCNC: 137 MMOL/L — SIGNIFICANT CHANGE UP (ref 135–145)
WBC # BLD: 5.94 K/UL — SIGNIFICANT CHANGE UP (ref 3.8–10.5)
WBC # FLD AUTO: 5.94 K/UL — SIGNIFICANT CHANGE UP (ref 3.8–10.5)

## 2019-03-25 PROCEDURE — 99232 SBSQ HOSP IP/OBS MODERATE 35: CPT | Mod: 57

## 2019-03-25 PROCEDURE — 88305 TISSUE EXAM BY PATHOLOGIST: CPT | Mod: 26

## 2019-03-25 PROCEDURE — 43237 ENDOSCOPIC US EXAM ESOPH: CPT | Mod: GC

## 2019-03-25 PROCEDURE — 88312 SPECIAL STAINS GROUP 1: CPT | Mod: 26

## 2019-03-25 PROCEDURE — 43239 EGD BIOPSY SINGLE/MULTIPLE: CPT | Mod: GC,59

## 2019-03-25 RX ADMIN — Medication 1 TABLET(S): at 12:55

## 2019-03-25 RX ADMIN — Medication 62.5 MILLIMOLE(S): at 09:55

## 2019-03-25 RX ADMIN — SIMVASTATIN 10 MILLIGRAM(S): 20 TABLET, FILM COATED ORAL at 21:14

## 2019-03-25 RX ADMIN — TAMSULOSIN HYDROCHLORIDE 0.4 MILLIGRAM(S): 0.4 CAPSULE ORAL at 21:14

## 2019-03-25 RX ADMIN — Medication 30 MILLILITER(S): at 21:14

## 2019-03-25 RX ADMIN — DEXTROSE MONOHYDRATE, SODIUM CHLORIDE, AND POTASSIUM CHLORIDE 100 MILLILITER(S): 50; .745; 4.5 INJECTION, SOLUTION INTRAVENOUS at 14:52

## 2019-03-25 RX ADMIN — Medication 150 MICROGRAM(S): at 05:32

## 2019-03-25 RX ADMIN — ENOXAPARIN SODIUM 40 MILLIGRAM(S): 100 INJECTION SUBCUTANEOUS at 21:14

## 2019-03-25 RX ADMIN — Medication 81 MILLIGRAM(S): at 12:55

## 2019-03-25 RX ADMIN — LOSARTAN POTASSIUM 50 MILLIGRAM(S): 100 TABLET, FILM COATED ORAL at 05:32

## 2019-03-25 NOTE — PROGRESS NOTE ADULT - SUBJECTIVE AND OBJECTIVE BOX
Pre-Endoscopy Evaluation      Referring Physician: dr. esperanza bishop                                   Procedure: eus/ercp    Indication for Procedure: gallstone pancreatitis     Pertinent History: 84 year old male with CAD s/p MI (40 yrs ago) and CABG, pacemaker, thyroid cancer s/p thyroidectomy, breast cancer s/p left mastectomy, and prostate cancer who presented to the emergency room with chief complain of intermittent, post-prandial upper abdominal pain that became worse the night before presentation, prompting him to come into the ED, found to have gallstone pancreatitis.      PAST MEDICAL & SURGICAL HISTORY:  Varicose vein  Hypothyroidism  Thyroid cancer  Breast cancer  Prostate cancer  HTN (hypertension)  HLD (hyperlipidemia)  CAD (coronary artery disease)  S/P left mastectomy  S/P thyroidectomy  S/P CABG x 2  PPM      PMH of Gastroparesis [ ]  Gastric Surgery [ ]  Gastric Outlet Obstruction [ ]    Allergies    lidocaine (Other)    Intolerances:    Latex allergy: [ ] yes [x] no    Medications:MEDICATIONS  (STANDING):  aspirin enteric coated 81 milliGRAM(s) Oral daily  dextrose 5% + sodium chloride 0.45% with potassium chloride 20 mEq/L 1000 milliLiter(s) (100 mL/Hr) IV Continuous <Continuous>  docusate sodium 100 milliGRAM(s) Oral three times a day  enoxaparin Injectable 40 milliGRAM(s) SubCutaneous daily  levothyroxine 150 MICROGram(s) Oral daily  losartan 50 milliGRAM(s) Oral daily  multivitamin 1 Tablet(s) Oral daily  simvastatin 10 milliGRAM(s) Oral at bedtime  tamsulosin 0.4 milliGRAM(s) Oral at bedtime    MEDICATIONS  (PRN):  acetaminophen   Tablet .. 325 milliGRAM(s) Oral every 4 hours PRN Mild Pain (1 - 3)  oxyCODONE    5 mG/acetaminophen 325 mG 1 Tablet(s) Oral every 4 hours PRN Moderate Pain (4 - 6)  oxyCODONE    5 mG/acetaminophen 325 mG 2 Tablet(s) Oral every 6 hours PRN Severe Pain (7 - 10)  senna 2 Tablet(s) Oral at bedtime PRN Constipation      Smoking: [ ] yes  [x] no    AICD/PPM: [X] yes   [ ] no    Pertinent lab data:                        10.0   5.94  )-----------( 197      ( 25 Mar 2019 09:18 )             30.0     03-25    137  |  106  |  7   ----------------------------<  153<H>  4.2   |  22  |  0.82    Ca    8.6      25 Mar 2019 07:15  Phos  2.5       Mg     1.7         TPro  5.7<L>  /  Alb  3.0<L>  /  TBili  0.4  /  DBili  0.1  /  AST  58<H>  /  ALT  164<H>  /  AlkPhos  205<H>      PT/INR - ( 24 Mar 2019 09:22 )   PT: 13.5 sec;   INR: 1.17 ratio         PTT - ( 24 Mar 2019 09:22 )  PTT:30.7 sec      < from: Transthoracic Echocardiogram (14 @ 10:40) >      Fractional short: 38 %  Ejection Fraction: 69 %  ------------------------------------------------------------------------  Observations:  Mitral Valve: Mitral annular calcification, otherwise  normal mitral valve. Minimal mitral regurgitation.  Aortic Valve/Aorta: Calcified trileaflet aortic valve with  normal opening. Moderate aortic regurgitation.  Mild aortic root dilatation  (Ao: 4.3 cm at the sinuses of  Valsalva).  Left Atrium: Moderately dilated left atrium.  LA volume  index = 38 cc/m2.  Left Ventricle: Normal left ventricular systolic function.  No segmental wall motion abnormalities. Normal left  ventricular internal dimensions and wall thicknesses.  Right Heart: Normal right atrium. Normal right ventricular  size and function. Normal tricuspid valve.  Minimal  tricuspid regurgitation. Normal pulmonic valve.  Minimal  pulmonic regurgitation.  Pericardium/Pleura: Normal pericardium with no pericardial  effusion.  Hemodynamic: Estimated right atrial pressure is 10 mm Hg.  Estimated right ventricular systolic pressure equals 33 mm  Hg, assuming right atrial pressure equals 10 mm Hg,  consistent with normal pulmonary pressures.  ------------------------------------------------------------------------  Conclusions:  1. Mitral annular calcification, otherwise normal mitral  valve. Minimal mitral regurgitation.  2. Calcified trileaflet aortic valve with normal opening.  Moderate aortic regurgitation.  3. Mild aortic root dilatation  (Ao: 4.3 cm at the sinuses  of Valsalva).  4. Moderately dilated left atrium.  LA volume index = 38  cc/m2.  5. Normal left ventricular internal dimensions and wall  thicknesses.  6. Normal left ventricular systolic function. No segmental  wall motion abnormalities.  7. Normal right ventricular size and function.  8. Estimated right ventricular systolic pressure equals 33  mm Hg, assuming right atrial pressure equals 10 mm Hg,  consistent with normal pulmonary pressures.    < end of copied text >      Physical Examination:  Daily     Daily Weight in k.5 (25 Mar 2019 11:43)  Vital Signs Last 24 Hrs  T(C): 36.6 (25 Mar 2019 13:33), Max: 37.1 (24 Mar 2019 21:19)  T(F): 97.8 (25 Mar 2019 13:33), Max: 98.8 (24 Mar 2019 21:19)  HR: 72 (25 Mar 2019 13:33) (59 - 72)  BP: 138/92 (25 Mar 2019 13:33) (138/81 - 156/84)  BP(mean): --  RR: 18 (25 Mar 2019 13:33) (18 - 18)  SpO2: 97% (25 Mar 2019 13:33) (94% - 97%)    Drug Dosing Weight  Height (cm): 177.8 (22 Mar 2019 00:19)  Weight (kg): 72.6 (22 Mar 2019 00:19)  BMI (kg/m2): 23 (22 Mar 2019 00:19)  BSA (m2): 1.9 (22 Mar 2019 00:19)    Constitutional: NAD     Neck:  No JVD    Respiratory: CTAB/L    Cardiovascular: S1 and S2    Gastrointestinal: BS+, soft, NT/ND    Extremities: No peripheral edema    Neurological: A/O x 3    : No [place]    Skin: No rashes    Comments:    ASA Class: I [ ]  II [ ]  III [ ]  IV [X]    The patient is a suitable candidate for the planned procedure unless box checked [ ]  No, explain:

## 2019-03-25 NOTE — PROGRESS NOTE ADULT - SUBJECTIVE AND OBJECTIVE BOX
Surgery Progress Note    S: Patient seen and examined. No acute events overnight. Patient denies any pain this AM. Currently NPO for EUS/ERCP today with GI. Patient was seen by cardiology yesterday who cleared him for procedure.     O:  Vital Signs Last 24 Hrs  T(C): 36.8 (25 Mar 2019 05:28), Max: 37.1 (24 Mar 2019 21:19)  T(F): 98.2 (25 Mar 2019 05:28), Max: 98.8 (24 Mar 2019 21:19)  HR: 59 (25 Mar 2019 05:28) (59 - 78)  BP: 138/81 (25 Mar 2019 05:28) (138/81 - 163/90)  BP(mean): --  RR: 18 (25 Mar 2019 05:28) (18 - 18)  SpO2: 95% (25 Mar 2019 05:28) (94% - 97%)    I&O's Detail    24 Mar 2019 07:01  -  25 Mar 2019 07:00  --------------------------------------------------------  IN:    dextrose 5% + sodium chloride 0.45% with potassium chloride 20 mEq/L: 2400 mL  Total IN: 2400 mL    OUT:    Voided: 1900 mL  Total OUT: 1900 mL    Total NET: 500 mL          MEDICATIONS  (STANDING):  aspirin enteric coated 81 milliGRAM(s) Oral daily  dextrose 5% + sodium chloride 0.45% with potassium chloride 20 mEq/L 1000 milliLiter(s) (100 mL/Hr) IV Continuous <Continuous>  docusate sodium 100 milliGRAM(s) Oral three times a day  enoxaparin Injectable 40 milliGRAM(s) SubCutaneous daily  levothyroxine 150 MICROGram(s) Oral daily  losartan 50 milliGRAM(s) Oral daily  multivitamin 1 Tablet(s) Oral daily  simvastatin 10 milliGRAM(s) Oral at bedtime  sodium phosphate IVPB 15 milliMole(s) IV Intermittent once  tamsulosin 0.4 milliGRAM(s) Oral at bedtime    MEDICATIONS  (PRN):  acetaminophen   Tablet .. 325 milliGRAM(s) Oral every 4 hours PRN Mild Pain (1 - 3)  oxyCODONE    5 mG/acetaminophen 325 mG 1 Tablet(s) Oral every 4 hours PRN Moderate Pain (4 - 6)  oxyCODONE    5 mG/acetaminophen 325 mG 2 Tablet(s) Oral every 6 hours PRN Severe Pain (7 - 10)  senna 2 Tablet(s) Oral at bedtime PRN Constipation                            10.4   6.80  )-----------( 179      ( 24 Mar 2019 10:01 )             30.7       03-25    137  |  106  |  7   ----------------------------<  153<H>  4.2   |  22  |  0.82    Ca    8.6      25 Mar 2019 07:15  Phos  2.5     03-25  Mg     1.7     03-25    TPro  5.7<L>  /  Alb  3.0<L>  /  TBili  0.4  /  DBili  0.1  /  AST  58<H>  /  ALT  164<H>  /  AlkPhos  205<H>  03-25      Physical Exam:  Gen: Laying in bed, NAD  Resp: Unlabored breathing  Abd: soft, NTND, no rebound or guarding  Ext: WWP  Skin: No rashes

## 2019-03-25 NOTE — DIETITIAN INITIAL EVALUATION ADULT. - OTHER INFO
Pt seen for NPO status. Per chart, pt presents with post-prandial upper abdominal pain initial lab work and radiology consistent with gallstone pancreatitis.  Pt currently NPO, EUS/ERCP today. Pt denies any history of chewing/swallowing difficulty or GI distress at this time. Pt educated on diet advancement, made aware RD remains available for nutrition education when diet advanced.

## 2019-03-25 NOTE — PROGRESS NOTE ADULT - SUBJECTIVE AND OBJECTIVE BOX
Chief Complaint:  Patient is a 84y old  Male who presents with a chief complaint of Gallstone Pancreatitis (24 Mar 2019 15:13)      Interval Events:   - no acute events overnight.    HPI: from consult note  84 year old male with CAD s/p MI (40 yrs ago) and CABG, pacemaker, thyroid cancer s/p thyroidectomy, breast cancer s/p left mastectomy, and prostate cancer who presented to the emergency room with chief complain of intermittent, post-prandial upper abdominal pain that became worse the night before presentation, prompting him to come into the ED.   Previous to last night, the patient states that his pain has only lasted for several hours, then resolved. However, last night, his pain started after dinner and continued until he was given pain medication in the ED. The patient complains of some associated nausea, but denies any fever, chills, vomiting, diaphoresis, melena or hematochezia. He moves his bowels daily, brown in color, has to strain hard to have a BM.     Last colonoscopy: 6 years ago, normal (previously had a polyp, benign)  Last EGD: never      Allergies:  lidocaine (Other)      Hospital Medications:  acetaminophen   Tablet .. 325 milliGRAM(s) Oral every 4 hours PRN  aspirin enteric coated 81 milliGRAM(s) Oral daily  dextrose 5% + sodium chloride 0.45% with potassium chloride 20 mEq/L 1000 milliLiter(s) IV Continuous <Continuous>  docusate sodium 100 milliGRAM(s) Oral three times a day  enoxaparin Injectable 40 milliGRAM(s) SubCutaneous daily  levothyroxine 150 MICROGram(s) Oral daily  losartan 50 milliGRAM(s) Oral daily  multivitamin 1 Tablet(s) Oral daily  oxyCODONE    5 mG/acetaminophen 325 mG 1 Tablet(s) Oral every 4 hours PRN  oxyCODONE    5 mG/acetaminophen 325 mG 2 Tablet(s) Oral every 6 hours PRN  senna 2 Tablet(s) Oral at bedtime PRN  simvastatin 10 milliGRAM(s) Oral at bedtime  sodium phosphate IVPB 15 milliMole(s) IV Intermittent once  tamsulosin 0.4 milliGRAM(s) Oral at bedtime      PMHX/PSHX:  Varicose vein  Hypothyroidism  Thyroid cancer  Breast cancer  Prostate cancer  HTN (hypertension)  HLD (hyperlipidemia)  CAD (coronary artery disease)  S/P left mastectomy  S/P thyroidectomy  S/P CABG x 2      Family history:      ROS:     General:  No wt loss, fevers, chills, night sweats, fatigue,   Eyes:  Good vision, no reported pain  ENT:  No sore throat, pain, runny nose, dysphagia  CV:  No pain, palpitations, hypo/hypertension  Resp:  No dyspnea, cough, tachypnea, wheezing  GI:  See HPI  :  No pain, bleeding, incontinence, nocturia  Muscle:  No pain, weakness  Neuro:  No weakness, tingling, memory problems  Psych:  No fatigue, insomnia, mood problems, depression  Endocrine:  No polyuria, polydipsia, cold/heat intolerance  Heme:  No petechiae, ecchymosis, easy bruisability  Skin:  No rash, edema      PHYSICAL EXAM:     GENERAL:  Appears stated age, well-groomed, well-nourished, no distress  HEENT:  NC/AT,  conjunctivae clear, sclera -anicteric  CHEST:  Full & symmetric excursion, no increased effort, breath sounds clear  HEART:  Regular rhythm, S1, S2, no murmur/rub/S3/S4,  no edema  ABDOMEN:  Soft, non-tender, non-distended, normoactive bowel sounds,  no masses ,no hepato-splenomegaly,   EXTREMITIES:  no cyanosis,clubbing or edema  SKIN:  No rash/erythema/ecchymoses/petechiae/wounds/abscess/warm/dry  NEURO:  Alert, oriented    Vital Signs:  Vital Signs Last 24 Hrs  T(C): 36.8 (25 Mar 2019 05:28), Max: 37.1 (24 Mar 2019 21:19)  T(F): 98.2 (25 Mar 2019 05:28), Max: 98.8 (24 Mar 2019 21:19)  HR: 59 (25 Mar 2019 05:28) (59 - 78)  BP: 138/81 (25 Mar 2019 05:28) (138/81 - 163/90)  BP(mean): --  RR: 18 (25 Mar 2019 05:28) (18 - 18)  SpO2: 95% (25 Mar 2019 05:28) (94% - 97%)  Daily     Daily     LABS:                        10.4   6.80  )-----------( 179      ( 24 Mar 2019 10:01 )             30.7     03-25    137  |  106  |  7   ----------------------------<  153<H>  4.2   |  22  |  0.82    Ca    8.6      25 Mar 2019 07:15  Phos  2.5     03-25  Mg     1.7     03-25    TPro  5.7<L>  /  Alb  3.0<L>  /  TBili  0.4  /  DBili  0.1  /  AST  58<H>  /  ALT  164<H>  /  AlkPhos  205<H>  03-25    LIVER FUNCTIONS - ( 25 Mar 2019 07:15 )  Alb: 3.0 g/dL / Pro: 5.7 g/dL / ALK PHOS: 205 U/L / ALT: 164 U/L / AST: 58 U/L / GGT: x           PT/INR - ( 24 Mar 2019 09:22 )   PT: 13.5 sec;   INR: 1.17 ratio         PTT - ( 24 Mar 2019 09:22 )  PTT:30.7 sec        Imaging:     from: CT Abdomen and Pelvis w/ IV Cont (03.22.19 @ 05:29) >    EXAM:  CT ABDOMEN AND PELVIS IC                            PROCEDURE DATE:  03/22/2019            INTERPRETATION:  CLINICAL INFORMATION: Postprandial epigastric and right   upper quadrant abdominal pain.      COMPARISON: None.    PROCEDURE:   CT ofthe Abdomen and Pelvis was performed with intravenous contrast.   Intravenous contrast: 90 ml Omnipaque 350. 10 ml discarded.  Oral contrast: None.  Sagittal and coronal reformats were performed.    FINDINGS:    LOWER CHEST: Subsegmental atelectasis.Cardiomegaly. Pacemaker leads.   Coronary artery calcification.     LIVER: Mildly decreased enhancement near the gallbladder fossa.  BILE DUCTS: Mild to moderate predominantly central intrahepatic biliary   dilatation. The common bile duct is dilatedup to 1.2 cm.  GALLBLADDER: Innumerable gallstones. Mild gallbladder wall thickening.   Trace pericholecystic fluid..  SPLEEN: Within normal limits.  PANCREAS: Mild peripancreatic stranding.    ADRENALS: Within normal limits.  KIDNEYS/URETERS: Prominent left proximal renal collecting system.   Nonspecific mild bilateral perinephric stranding without   hydroureteronephrosis. Subcentimeter hypodense focus in the left kidney,   too small to characterize.  BLADDER: Partially distended. Prominent wall.  REPRODUCTIVE ORGANS: Borderline-enlarged prostate.    BOWEL: Small hiatal hernia. No bowel obstruction. Colon diverticulosis.   Borderline dilated, fluid-filled appendix without surrounding   inflammatory change.  PERITONEUM: No free air or drainable fluid collection.   VESSELS:  Atherosclerotic change of the abdominal aorta and its branches.  RETROPERITONEUM: No lymphadenopathy.    ABDOMINAL WALL: Small fat-containing umbilical hernia.  BONES: Osteopenia. Median sternotomy. S-shaped curvature and degenerative   changes of the spine. Age-indeterminate compression fractures with   associated vertebral height loss at T12, L3, and L4, most severe at T12.   No significant osseous retropulsion into the spinal canal.    IMPRESSION:   Suspect acute cholecystitis. Recommend clinical correlation and additional imaging for further evaluation.   Mild to moderate intrahepatic biliary dilatation. Common bile duct dilatation. If there is clinical suspicion for choledocholithiasis, MRCP/MRI may be obtained for further evaluation.  Nonspecific mild peripancreatic stranding. Recommend correlation with serum lipase to assess superimposed acute pancreatitis.  Decreased hepatic enhancement near the gallbladder fossa, which may be reactive although hepatic extension of gallbladder inflammation cannot be excluded.  Borderline dilated, fluid-filled appendix without surrounding inflammatory change. Clinical question is advised to assess acute appendicitis.   Prominent bladder wall, which may be due to partial distention. Recommend correlation with urinalysis to assess urinary tract infection.  Age-indeterminate compression fractures of the spine. Recommend comparison to a previous outside study to assess stability.  < end of copied text > Chief Complaint:  Patient is a 84y old  Male who presents with a chief complaint of Gallstone Pancreatitis (24 Mar 2019 15:13)      Interval Events:   - no acute events overnight.  - abdominal pain has improved.  - he denies nausea, vomiting    HPI: from consult note  84 year old male with CAD s/p MI (40 yrs ago) and CABG, pacemaker, thyroid cancer s/p thyroidectomy, breast cancer s/p left mastectomy, and prostate cancer who presented to the emergency room with chief complain of intermittent, post-prandial upper abdominal pain that became worse the night before presentation, prompting him to come into the ED.   Previous to last night, the patient states that his pain has only lasted for several hours, then resolved. However, last night, his pain started after dinner and continued until he was given pain medication in the ED. The patient complains of some associated nausea, but denies any fever, chills, vomiting, diaphoresis, melena or hematochezia. He moves his bowels daily, brown in color, has to strain hard to have a BM.     Last colonoscopy: 6 years ago, normal (previously had a polyp, benign)  Last EGD: never      Allergies:  lidocaine (Other)      Hospital Medications:  acetaminophen   Tablet .. 325 milliGRAM(s) Oral every 4 hours PRN  aspirin enteric coated 81 milliGRAM(s) Oral daily  dextrose 5% + sodium chloride 0.45% with potassium chloride 20 mEq/L 1000 milliLiter(s) IV Continuous <Continuous>  docusate sodium 100 milliGRAM(s) Oral three times a day  enoxaparin Injectable 40 milliGRAM(s) SubCutaneous daily  levothyroxine 150 MICROGram(s) Oral daily  losartan 50 milliGRAM(s) Oral daily  multivitamin 1 Tablet(s) Oral daily  oxyCODONE    5 mG/acetaminophen 325 mG 1 Tablet(s) Oral every 4 hours PRN  oxyCODONE    5 mG/acetaminophen 325 mG 2 Tablet(s) Oral every 6 hours PRN  senna 2 Tablet(s) Oral at bedtime PRN  simvastatin 10 milliGRAM(s) Oral at bedtime  sodium phosphate IVPB 15 milliMole(s) IV Intermittent once  tamsulosin 0.4 milliGRAM(s) Oral at bedtime      PMHX/PSHX:  Varicose vein  Hypothyroidism  Thyroid cancer  Breast cancer  Prostate cancer  HTN (hypertension)  HLD (hyperlipidemia)  CAD (coronary artery disease)  S/P left mastectomy  S/P thyroidectomy  S/P CABG x 2      ROS:     General:  No wt loss, fevers, chills, night sweats, fatigue,   Eyes:  Good vision, no reported pain  ENT:  No sore throat, pain, runny nose, dysphagia  CV:  No pain, palpitations, hypo/hypertension  Resp:  No dyspnea, cough, tachypnea, wheezing  GI:  See HPI  :  No pain, bleeding, incontinence, nocturia  Muscle:  No pain, weakness  Neuro:  No weakness, tingling, memory problems  Psych:  No fatigue, insomnia, mood problems, depression  Endocrine:  No polyuria, polydipsia, cold/heat intolerance  Heme:  No petechiae, ecchymosis, easy bruisability  Skin:  No rash, edema      PHYSICAL EXAM:     GENERAL:  Appears stated age, well-groomed, well-nourished, no distress  HEENT:  NC/AT,  conjunctivae clear, sclera -anicteric  CHEST:  Full & symmetric excursion, no increased effort, breath sounds clear  HEART:  Regular rhythm, S1, S2, no murmur/rub/S3/S4,  no edema  ABDOMEN:  Soft, non-tender, non-distended, normoactive bowel sounds,  no masses ,no hepato-splenomegaly,   EXTREMITIES:  no cyanosis,clubbing or edema  SKIN:  No rash/erythema/ecchymoses/petechiae/wounds/abscess/warm/dry  NEURO:  Alert, oriented    Vital Signs:  Vital Signs Last 24 Hrs  T(C): 36.8 (25 Mar 2019 05:28), Max: 37.1 (24 Mar 2019 21:19)  T(F): 98.2 (25 Mar 2019 05:28), Max: 98.8 (24 Mar 2019 21:19)  HR: 59 (25 Mar 2019 05:28) (59 - 78)  BP: 138/81 (25 Mar 2019 05:28) (138/81 - 163/90)  BP(mean): --  RR: 18 (25 Mar 2019 05:28) (18 - 18)  SpO2: 95% (25 Mar 2019 05:28) (94% - 97%)  Daily     Daily     LABS:                        10.4   6.80  )-----------( 179      ( 24 Mar 2019 10:01 )             30.7     03-25    137  |  106  |  7   ----------------------------<  153<H>  4.2   |  22  |  0.82    Ca    8.6      25 Mar 2019 07:15  Phos  2.5     03-25  Mg     1.7     03-25    TPro  5.7<L>  /  Alb  3.0<L>  /  TBili  0.4  /  DBili  0.1  /  AST  58<H>  /  ALT  164<H>  /  AlkPhos  205<H>  03-25    LIVER FUNCTIONS - ( 25 Mar 2019 07:15 )  Alb: 3.0 g/dL / Pro: 5.7 g/dL / ALK PHOS: 205 U/L / ALT: 164 U/L / AST: 58 U/L / GGT: x           PT/INR - ( 24 Mar 2019 09:22 )   PT: 13.5 sec;   INR: 1.17 ratio         PTT - ( 24 Mar 2019 09:22 )  PTT:30.7 sec        Imaging:     from: CT Abdomen and Pelvis w/ IV Cont (03.22.19 @ 05:29) >    EXAM:  CT ABDOMEN AND PELVIS IC                            PROCEDURE DATE:  03/22/2019            INTERPRETATION:  CLINICAL INFORMATION: Postprandial epigastric and right   upper quadrant abdominal pain.      COMPARISON: None.    PROCEDURE:   CT ofthe Abdomen and Pelvis was performed with intravenous contrast.   Intravenous contrast: 90 ml Omnipaque 350. 10 ml discarded.  Oral contrast: None.  Sagittal and coronal reformats were performed.    FINDINGS:    LOWER CHEST: Subsegmental atelectasis.Cardiomegaly. Pacemaker leads.   Coronary artery calcification.     LIVER: Mildly decreased enhancement near the gallbladder fossa.  BILE DUCTS: Mild to moderate predominantly central intrahepatic biliary   dilatation. The common bile duct is dilatedup to 1.2 cm.  GALLBLADDER: Innumerable gallstones. Mild gallbladder wall thickening.   Trace pericholecystic fluid..  SPLEEN: Within normal limits.  PANCREAS: Mild peripancreatic stranding.    ADRENALS: Within normal limits.  KIDNEYS/URETERS: Prominent left proximal renal collecting system.   Nonspecific mild bilateral perinephric stranding without   hydroureteronephrosis. Subcentimeter hypodense focus in the left kidney,   too small to characterize.  BLADDER: Partially distended. Prominent wall.  REPRODUCTIVE ORGANS: Borderline-enlarged prostate.    BOWEL: Small hiatal hernia. No bowel obstruction. Colon diverticulosis.   Borderline dilated, fluid-filled appendix without surrounding   inflammatory change.  PERITONEUM: No free air or drainable fluid collection.   VESSELS:  Atherosclerotic change of the abdominal aorta and its branches.  RETROPERITONEUM: No lymphadenopathy.    ABDOMINAL WALL: Small fat-containing umbilical hernia.  BONES: Osteopenia. Median sternotomy. S-shaped curvature and degenerative   changes of the spine. Age-indeterminate compression fractures with   associated vertebral height loss at T12, L3, and L4, most severe at T12.   No significant osseous retropulsion into the spinal canal.    IMPRESSION:   Suspect acute cholecystitis. Recommend clinical correlation and additional imaging for further evaluation.   Mild to moderate intrahepatic biliary dilatation. Common bile duct dilatation. If there is clinical suspicion for choledocholithiasis, MRCP/MRI may be obtained for further evaluation.  Nonspecific mild peripancreatic stranding. Recommend correlation with serum lipase to assess superimposed acute pancreatitis.  Decreased hepatic enhancement near the gallbladder fossa, which may be reactive although hepatic extension of gallbladder inflammation cannot be excluded.  Borderline dilated, fluid-filled appendix without surrounding inflammatory change. Clinical question is advised to assess acute appendicitis.   Prominent bladder wall, which may be due to partial distention. Recommend correlation with urinalysis to assess urinary tract infection.  Age-indeterminate compression fractures of the spine. Recommend comparison to a previous outside study to assess stability.  < end of copied text >

## 2019-03-25 NOTE — DIETITIAN INITIAL EVALUATION ADULT. - ENERGY NEEDS
Ht: 70 Wt: 160 pounds BMI: 23 kg/m2 IBW: 166  pounds(+/-10%)   No edema. No pressure ulcers documented.

## 2019-03-25 NOTE — DIETITIAN INITIAL EVALUATION ADULT. - ORAL INTAKE PTA
good/Pt reports very good appetite and PO intake PTA. Pt typically consumes 2 meals daily; breakfast: cereal and eggs, early dinner: (large portion) of pasta with a protein. Pt takes MVI, vitamin D, B-complex, potassium, magnesium and fish oil. NKFA.

## 2019-03-25 NOTE — PROGRESS NOTE ADULT - ASSESSMENT
84 year old male with CAD s/p MI (40 yrs ago) and CABG, pacemaker, thyroid cancer s/p thyroidectomy, breast cancer s/p left mastectomy, and prostate cancer who presented to the emergency room with chief complain of intermittent, post-prandial upper abdominal pain that became worse the night before presentation, prompting him to come into the ED, found to have gallstone pancreatitis.    IMPRESSION  1. Abdominal pain in the setting of gallstone pancreatitis, acute interstitial (Bisap score 2), concern for choledocholithiasis on CT A/P, however, does meet criteria for cholangitis at this time.  2. CAD s/p MI (40 yrs ago) and CABG  3. S/P PPM     RECOMMENDATIONS    - trend CBC, CMP, INR  - low threshold for IV antibiotics if patient becomes febrile or with increasing leukocytosis  - will plan for EUS +/- ERCP, likely today based on the schedule   - if he decompensates or any signs of cholangitis, then will plan for ERCP sooner   - appreciate cardiology consult, patient is optimised for the procedure   - surgery consult for eventual cholecystectomy   - rest of care per primary team       Gaurav Rendon, PGY-5  GI fellow  B- [id number]/ [phone]  Please call GI fellow on call after 5pm and on weekends 84 year old male with CAD s/p MI (40 yrs ago) and CABG, pacemaker, thyroid cancer s/p thyroidectomy, breast cancer s/p left mastectomy, and prostate cancer who presented to the emergency room with chief complain of intermittent, post-prandial upper abdominal pain that became worse the night before presentation, prompting him to come into the ED, found to have gallstone pancreatitis.    IMPRESSION  1. Abdominal pain in the setting of acute interstitial nonsevere pancreatitis (2/2 gallstones) (Bisap score 2), concern for choledocholithiasis given CBD is 1.2cm on CT, however, does meet criteria for cholangitis at this time  2. CAD s/p MI (40 yrs ago) and CABG  3. S/P PPM     RECOMMENDATIONS    - trend CBC, CMP, INR  - low threshold for IV antibiotics if patient becomes febrile or with increasing leukocytosis  - will plan for EUS +/- ERCP, likely today based on the schedule (patient can not get an MRI due to a PPM in place)  - if he decompensates or any signs of cholangitis, then will plan for ERCP sooner   - appreciate cardiology consult, patient is optimised for the procedure   - surgery consult for eventual cholecystectomy   - rest of care per primary team       Gaurav Rendon, PGY-5  GI fellow  B- [id number]/ [phone]  Please call GI fellow on call after 5pm and on weekends

## 2019-03-25 NOTE — PROGRESS NOTE ADULT - ATTENDING COMMENTS
seen and examined 03-25-19 @ 0955    afeb  AVSS  soft / NT / ND    WBC = 6  LFTs - elevated but improving    gallstone pancreatitis  -EUS +/- ERCP today  -booked for laparoscopic cholecystectomy Wed 3/27 @ 1400    The risks (bleeding, infection, bile duct injury), benefits and alternatives of laparoscopic (possible open) cholecystectomy were discussed with the patient. The patient desires this surgery.

## 2019-03-25 NOTE — PROGRESS NOTE ADULT - ASSESSMENT
84 y.o. 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 Initial lab work and radiology consistent with gallstone pancreatitis.     Plan:  - pacemaker record obtained, reviewed documentation, not compatible with MRI   - NPO/IVF  - f/u EUS/ERCP today  - cardiology cleared for procedure and surgery  - Continue home medications  - lovenox for DVT ppx    ACS [id number]

## 2019-03-26 ENCOUNTER — TRANSCRIPTION ENCOUNTER (OUTPATIENT)
Age: 84
End: 2019-03-26

## 2019-03-26 DIAGNOSIS — Z79.899 OTHER LONG TERM (CURRENT) DRUG THERAPY: ICD-10-CM

## 2019-03-26 DIAGNOSIS — C61 MALIGNANT NEOPLASM OF PROSTATE: ICD-10-CM

## 2019-03-26 DIAGNOSIS — I25.10 ATHEROSCLEROTIC HEART DISEASE OF NATIVE CORONARY ARTERY WITHOUT ANGINA PECTORIS: ICD-10-CM

## 2019-03-26 DIAGNOSIS — Z29.9 ENCOUNTER FOR PROPHYLACTIC MEASURES, UNSPECIFIED: ICD-10-CM

## 2019-03-26 DIAGNOSIS — I10 ESSENTIAL (PRIMARY) HYPERTENSION: ICD-10-CM

## 2019-03-26 DIAGNOSIS — K85.10 BILIARY ACUTE PANCREATITIS WITHOUT NECROSIS OR INFECTION: ICD-10-CM

## 2019-03-26 DIAGNOSIS — C50.919 MALIGNANT NEOPLASM OF UNSPECIFIED SITE OF UNSPECIFIED FEMALE BREAST: ICD-10-CM

## 2019-03-26 DIAGNOSIS — R94.5 ABNORMAL RESULTS OF LIVER FUNCTION STUDIES: ICD-10-CM

## 2019-03-26 DIAGNOSIS — E03.9 HYPOTHYROIDISM, UNSPECIFIED: ICD-10-CM

## 2019-03-26 LAB
ALBUMIN SERPL ELPH-MCNC: 3.5 G/DL — SIGNIFICANT CHANGE UP (ref 3.3–5)
ALP SERPL-CCNC: 201 U/L — HIGH (ref 40–120)
ALT FLD-CCNC: 140 U/L — HIGH (ref 10–45)
ANION GAP SERPL CALC-SCNC: 12 MMOL/L — SIGNIFICANT CHANGE UP (ref 5–17)
AST SERPL-CCNC: 43 U/L — HIGH (ref 10–40)
BILIRUB DIRECT SERPL-MCNC: 0.1 MG/DL — SIGNIFICANT CHANGE UP (ref 0–0.2)
BILIRUB INDIRECT FLD-MCNC: 0.3 MG/DL — SIGNIFICANT CHANGE UP (ref 0.2–1)
BILIRUB SERPL-MCNC: 0.4 MG/DL — SIGNIFICANT CHANGE UP (ref 0.2–1.2)
BUN SERPL-MCNC: 8 MG/DL — SIGNIFICANT CHANGE UP (ref 7–23)
CALCIUM SERPL-MCNC: 9.1 MG/DL — SIGNIFICANT CHANGE UP (ref 8.4–10.5)
CHLORIDE SERPL-SCNC: 102 MMOL/L — SIGNIFICANT CHANGE UP (ref 96–108)
CO2 SERPL-SCNC: 21 MMOL/L — LOW (ref 22–31)
CREAT SERPL-MCNC: 0.94 MG/DL — SIGNIFICANT CHANGE UP (ref 0.5–1.3)
GLUCOSE BLDC GLUCOMTR-MCNC: 103 MG/DL — HIGH (ref 70–99)
GLUCOSE BLDC GLUCOMTR-MCNC: 76 MG/DL — SIGNIFICANT CHANGE UP (ref 70–99)
GLUCOSE SERPL-MCNC: 100 MG/DL — HIGH (ref 70–99)
HCT VFR BLD CALC: 32.9 % — LOW (ref 39–50)
HGB BLD-MCNC: 11.4 G/DL — LOW (ref 13–17)
MAGNESIUM SERPL-MCNC: 1.8 MG/DL — SIGNIFICANT CHANGE UP (ref 1.6–2.6)
MCHC RBC-ENTMCNC: 34.4 PG — HIGH (ref 27–34)
MCHC RBC-ENTMCNC: 34.5 GM/DL — SIGNIFICANT CHANGE UP (ref 32–36)
MCV RBC AUTO: 99.6 FL — SIGNIFICANT CHANGE UP (ref 80–100)
PHOSPHATE SERPL-MCNC: 2.4 MG/DL — LOW (ref 2.5–4.5)
PLATELET # BLD AUTO: 227 K/UL — SIGNIFICANT CHANGE UP (ref 150–400)
POTASSIUM SERPL-MCNC: 4.1 MMOL/L — SIGNIFICANT CHANGE UP (ref 3.5–5.3)
POTASSIUM SERPL-SCNC: 4.1 MMOL/L — SIGNIFICANT CHANGE UP (ref 3.5–5.3)
PROT SERPL-MCNC: 6.4 G/DL — SIGNIFICANT CHANGE UP (ref 6–8.3)
RBC # BLD: 3.31 M/UL — LOW (ref 4.2–5.8)
RBC # FLD: 12.2 % — SIGNIFICANT CHANGE UP (ref 10.3–14.5)
SODIUM SERPL-SCNC: 135 MMOL/L — SIGNIFICANT CHANGE UP (ref 135–145)
SURGICAL PATHOLOGY STUDY: SIGNIFICANT CHANGE UP
WBC # BLD: 5.7 K/UL — SIGNIFICANT CHANGE UP (ref 3.8–10.5)
WBC # FLD AUTO: 5.7 K/UL — SIGNIFICANT CHANGE UP (ref 3.8–10.5)

## 2019-03-26 PROCEDURE — 99232 SBSQ HOSP IP/OBS MODERATE 35: CPT | Mod: GC

## 2019-03-26 PROCEDURE — 99223 1ST HOSP IP/OBS HIGH 75: CPT

## 2019-03-26 PROCEDURE — 99231 SBSQ HOSP IP/OBS SF/LOW 25: CPT | Mod: 57

## 2019-03-26 RX ORDER — DEXTROSE MONOHYDRATE, SODIUM CHLORIDE, AND POTASSIUM CHLORIDE 50; .745; 4.5 G/1000ML; G/1000ML; G/1000ML
1000 INJECTION, SOLUTION INTRAVENOUS
Qty: 0 | Refills: 0 | Status: DISCONTINUED | OUTPATIENT
Start: 2019-03-26 | End: 2019-03-26

## 2019-03-26 RX ORDER — SODIUM CHLORIDE 9 MG/ML
1000 INJECTION, SOLUTION INTRAVENOUS
Qty: 0 | Refills: 0 | Status: DISCONTINUED | OUTPATIENT
Start: 2019-03-26 | End: 2019-03-28

## 2019-03-26 RX ORDER — DEXTROSE 50 % IN WATER 50 %
25 SYRINGE (ML) INTRAVENOUS ONCE
Qty: 0 | Refills: 0 | Status: DISCONTINUED | OUTPATIENT
Start: 2019-03-26 | End: 2019-03-27

## 2019-03-26 RX ORDER — DEXTROSE 50 % IN WATER 50 %
15 SYRINGE (ML) INTRAVENOUS ONCE
Qty: 0 | Refills: 0 | Status: DISCONTINUED | OUTPATIENT
Start: 2019-03-26 | End: 2019-03-27

## 2019-03-26 RX ORDER — GLUCAGON INJECTION, SOLUTION 0.5 MG/.1ML
1 INJECTION, SOLUTION SUBCUTANEOUS ONCE
Qty: 0 | Refills: 0 | Status: DISCONTINUED | OUTPATIENT
Start: 2019-03-26 | End: 2019-03-27

## 2019-03-26 RX ORDER — LOSARTAN POTASSIUM 100 MG/1
50 TABLET, FILM COATED ORAL DAILY
Qty: 0 | Refills: 0 | Status: DISCONTINUED | OUTPATIENT
Start: 2019-03-26 | End: 2019-03-27

## 2019-03-26 RX ORDER — SODIUM CHLORIDE 9 MG/ML
1000 INJECTION, SOLUTION INTRAVENOUS
Qty: 0 | Refills: 0 | Status: DISCONTINUED | OUTPATIENT
Start: 2019-03-26 | End: 2019-03-27

## 2019-03-26 RX ORDER — DEXTROSE 50 % IN WATER 50 %
12.5 SYRINGE (ML) INTRAVENOUS ONCE
Qty: 0 | Refills: 0 | Status: DISCONTINUED | OUTPATIENT
Start: 2019-03-26 | End: 2019-03-27

## 2019-03-26 RX ORDER — INSULIN LISPRO 100/ML
VIAL (ML) SUBCUTANEOUS
Qty: 0 | Refills: 0 | Status: DISCONTINUED | OUTPATIENT
Start: 2019-03-26 | End: 2019-03-27

## 2019-03-26 RX ADMIN — TAMSULOSIN HYDROCHLORIDE 0.4 MILLIGRAM(S): 0.4 CAPSULE ORAL at 21:35

## 2019-03-26 RX ADMIN — Medication 1 TABLET(S): at 12:56

## 2019-03-26 RX ADMIN — LOSARTAN POTASSIUM 50 MILLIGRAM(S): 100 TABLET, FILM COATED ORAL at 05:01

## 2019-03-26 RX ADMIN — Medication 150 MICROGRAM(S): at 05:01

## 2019-03-26 RX ADMIN — SODIUM CHLORIDE 75 MILLILITER(S): 9 INJECTION, SOLUTION INTRAVENOUS at 15:06

## 2019-03-26 RX ADMIN — SIMVASTATIN 10 MILLIGRAM(S): 20 TABLET, FILM COATED ORAL at 21:35

## 2019-03-26 RX ADMIN — ENOXAPARIN SODIUM 40 MILLIGRAM(S): 100 INJECTION SUBCUTANEOUS at 12:56

## 2019-03-26 RX ADMIN — Medication 81 MILLIGRAM(S): at 12:56

## 2019-03-26 RX ADMIN — Medication 62.5 MILLIMOLE(S): at 14:04

## 2019-03-26 NOTE — CHART NOTE - NSCHARTNOTEFT_GEN_A_CORE
- Reviewed MOLST from with patient this morning after AM rounds.   - Patient indicated that he would want to receive resuscitation prior to or after the surgery.   - Patient indicated that he woud want to be intubated in the event his breathing were to cease.   - Patient indicated that only under certain complications would he not want to be resuscitated, which include "being connected to tubes and unable to respond."   - At this time the patient does not wish to be categorized as DNR/DNI. However, an advanced directive may be more appropriate. The patient asked for time to discuss this with his family and to remain full code for now.   - Situation discussed with the hospitalist. MOLST form can be clarified/revisited with the family after the surgery as patient demonstrates capacity.       ACS [id number] - Reviewed MOLST form with patient this morning after AM rounds.   - Patient indicated that he would want to receive resuscitation prior to or after the surgery.   - Patient indicated that he woud want to be intubated in the event his breathing were to cease.   - Patient indicated that only under certain complications would he not want to be resuscitated, which include "being connected to tubes and unable to respond."   - At this time the patient does not wish to be categorized as DNR/DNI. However, an advanced directive may be more appropriate. The patient asked for time to discuss this with his family and to remain full code for now.   - Situation discussed with the hospitalist. MOLST form can be clarified/revisited with the family after the surgery as patient demonstrates capacity.       ACS [id number]

## 2019-03-26 NOTE — PROGRESS NOTE ADULT - ASSESSMENT
84 y.o. 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 Initial lab work and radiology consistent with gallstone pancreatitis s/p EUS 3/26    Plan:   - CLD today, NPO p MN  - OR Wednesday 3/27 @ 2pm for lap john  - cardiology cleared for procedure and surgery  - Continue home medications  - lovenox for DVT ppx    Raina Blake PA-C [id number]

## 2019-03-26 NOTE — CONSULT NOTE ADULT - PROBLEM SELECTOR RECOMMENDATION 3
s/p CABG and prior PPM for bradycardia. No active issues at present.  - cardiology eval noted, patient class II RCRI for planned procedure, currently medically optimized  - continue aspirin, ARB  - pt not on B-blocker presumably due to bradycardia

## 2019-03-26 NOTE — PROGRESS NOTE ADULT - SUBJECTIVE AND OBJECTIVE BOX
Chief Complaint:  Patient is a 84y old  Male who presents with a chief complaint of Gallstone Pancreatitis (26 Mar 2019 07:19)      Interval Events:   - patient underwent an EUS yesterday, tolerated the procedure with no issues  - no acute events overnight.      HPI: from consult note  84 year old male with CAD s/p MI (40 yrs ago) and CABG, pacemaker, thyroid cancer s/p thyroidectomy, breast cancer s/p left mastectomy, and prostate cancer who presented to the emergency room with chief complain of intermittent, post-prandial upper abdominal pain that became worse the night before presentation, prompting him to come into the ED.   Previous to last night, the patient states that his pain has only lasted for several hours, then resolved. However, last night, his pain started after dinner and continued until he was given pain medication in the ED. The patient complains of some associated nausea, but denies any fever, chills, vomiting, diaphoresis, melena or hematochezia. He moves his bowels daily, brown in color, has to strain hard to have a BM.     Last colonoscopy: 6 years ago, normal (previously had a polyp, benign)  Last EGD: never    Allergies:  lidocaine (Other)      Hospital Medications:  acetaminophen   Tablet .. 325 milliGRAM(s) Oral every 4 hours PRN  aspirin enteric coated 81 milliGRAM(s) Oral daily  dextrose 5% + sodium chloride 0.45% with potassium chloride 20 mEq/L 1000 milliLiter(s) IV Continuous <Continuous>  docusate sodium 100 milliGRAM(s) Oral three times a day  enoxaparin Injectable 40 milliGRAM(s) SubCutaneous daily  levothyroxine 150 MICROGram(s) Oral daily  losartan 50 milliGRAM(s) Oral daily  multivitamin 1 Tablet(s) Oral daily  oxyCODONE    5 mG/acetaminophen 325 mG 1 Tablet(s) Oral every 4 hours PRN  oxyCODONE    5 mG/acetaminophen 325 mG 2 Tablet(s) Oral every 6 hours PRN  senna 2 Tablet(s) Oral at bedtime PRN  simvastatin 10 milliGRAM(s) Oral at bedtime  tamsulosin 0.4 milliGRAM(s) Oral at bedtime      PMHX/PSHX:  Varicose vein  Hypothyroidism  Thyroid cancer  Breast cancer  Prostate cancer  HTN (hypertension)  HLD (hyperlipidemia)  CAD (coronary artery disease)  S/P left mastectomy  S/P thyroidectomy  S/P CABG x 2      ROS:     General:  No wt loss, fevers, chills, night sweats, fatigue,   Eyes:  Good vision, no reported pain  ENT:  No sore throat, pain, runny nose, dysphagia  CV:  No pain, palpitations, hypo/hypertension  Resp:  No dyspnea, cough, tachypnea, wheezing  GI:  See HPI  :  No pain, bleeding, incontinence, nocturia  Muscle:  No pain, weakness  Neuro:  No weakness, tingling, memory problems  Psych:  No fatigue, insomnia, mood problems, depression  Endocrine:  No polyuria, polydipsia, cold/heat intolerance  Heme:  No petechiae, ecchymosis, easy bruisability  Skin:  No rash, edema      PHYSICAL EXAM:     GENERAL:  Appears stated age, well-groomed, well-nourished, no distress  HEENT:  NC/AT,  conjunctivae clear, sclera -anicteric  CHEST:  Full & symmetric excursion, no increased effort, breath sounds clear  HEART:  Regular rhythm, S1, S2, no murmur/rub/S3/S4,  no edema  ABDOMEN:  Soft, non-tender, non-distended, normoactive bowel sounds,  no masses ,no hepato-splenomegaly,   EXTREMITIES:  no cyanosis,clubbing or edema  SKIN:  No rash/erythema/ecchymoses/petechiae/wounds/abscess/warm/dry  NEURO:  Alert, oriented    Vital Signs:  Vital Signs Last 24 Hrs  T(C): 36.3 (26 Mar 2019 08:56), Max: 36.7 (25 Mar 2019 21:06)  T(F): 97.4 (26 Mar 2019 08:56), Max: 98.1 (25 Mar 2019 21:06)  HR: 87 (26 Mar 2019 08:56) (63 - 87)  BP: 100/70 (26 Mar 2019 09:05) (94/61 - 170/91)  BP(mean): --  RR: 18 (26 Mar 2019 08:56) (18 - 18)  SpO2: 97% (26 Mar 2019 08:56) (95% - 97%)  Daily Height in cm: 177.8 (25 Mar 2019 18:12)    Daily     LABS:                        10.0   5.94  )-----------( 197      ( 25 Mar 2019 09:18 )             30.0     03-25    137  |  106  |  7   ----------------------------<  153<H>  4.2   |  22  |  0.82    Ca    8.6      25 Mar 2019 07:15  Phos  2.5     03-25  Mg     1.7     03-25    TPro  5.7<L>  /  Alb  3.0<L>  /  TBili  0.4  /  DBili  0.1  /  AST  58<H>  /  ALT  164<H>  /  AlkPhos  205<H>  03-25    LIVER FUNCTIONS - ( 25 Mar 2019 07:15 )  Alb: 3.0 g/dL / Pro: 5.7 g/dL / ALK PHOS: 205 U/L / ALT: 164 U/L / AST: 58 U/L / GGT: x                   Imaging:  from: CT Abdomen and Pelvis w/ IV Cont (03.22.19 @ 05:29) >    EXAM:  CT ABDOMEN AND PELVIS IC                            PROCEDURE DATE:  03/22/2019            INTERPRETATION:  CLINICAL INFORMATION: Postprandial epigastric and right   upper quadrant abdominal pain.      COMPARISON: None.    PROCEDURE:   CT ofthe Abdomen and Pelvis was performed with intravenous contrast.   Intravenous contrast: 90 ml Omnipaque 350. 10 ml discarded.  Oral contrast: None.  Sagittal and coronal reformats were performed.    FINDINGS:    LOWER CHEST: Subsegmental atelectasis.Cardiomegaly. Pacemaker leads.   Coronary artery calcification.     LIVER: Mildly decreased enhancement near the gallbladder fossa.  BILE DUCTS: Mild to moderate predominantly central intrahepatic biliary   dilatation. The common bile duct is dilatedup to 1.2 cm.  GALLBLADDER: Innumerable gallstones. Mild gallbladder wall thickening.   Trace pericholecystic fluid..  SPLEEN: Within normal limits.  PANCREAS: Mild peripancreatic stranding.    ADRENALS: Within normal limits.  KIDNEYS/URETERS: Prominent left proximal renal collecting system.   Nonspecific mild bilateral perinephric stranding without   hydroureteronephrosis. Subcentimeter hypodense focus in the left kidney,   too small to characterize.  BLADDER: Partially distended. Prominent wall.  REPRODUCTIVE ORGANS: Borderline-enlarged prostate.    BOWEL: Small hiatal hernia. No bowel obstruction. Colon diverticulosis.   Borderline dilated, fluid-filled appendix without surrounding   inflammatory change.  PERITONEUM: No free air or drainable fluid collection.   VESSELS:  Atherosclerotic change of the abdominal aorta and its branches.  RETROPERITONEUM: No lymphadenopathy.    ABDOMINAL WALL: Small fat-containing umbilical hernia.  BONES: Osteopenia. Median sternotomy. S-shaped curvature and degenerative   changes of the spine. Age-indeterminate compression fractures with   associated vertebral height loss at T12, L3, and L4, most severe at T12.   No significant osseous retropulsion into the spinal canal.    IMPRESSION:   Suspect acute cholecystitis. Recommend clinical correlation and additional imaging for further evaluation.   Mild to moderate intrahepatic biliary dilatation. Common bile duct dilatation. If there is clinical suspicion for choledocholithiasis, MRCP/MRI may be obtained for further evaluation.  Nonspecific mild peripancreatic stranding. Recommend correlation with serum lipase to assess superimposed acute pancreatitis.  Decreased hepatic enhancement near the gallbladder fossa, which may be reactive although hepatic extension of gallbladder inflammation cannot be excluded.  Borderline dilated, fluid-filled appendix without surrounding inflammatory change. Clinical question is advised to assess acute appendicitis.   Prominent bladder wall, which may be due to partial distention. Recommend correlation with urinalysis to assess urinary tract infection.  Age-indeterminate compression fractures of the spine. Recommend comparison to a previous outside study to assess stability.  < end of copied text >      < from: Upper EUS (03.25.19 @ 18:30) >  Procedure:           Upper EUS  Indications:         84 year old male presented with abdominal pain and abnormal LFTs. He was                        found to have acute interstitial pancreatitis and a CT revealed common bile                       duct dilation of 1.2cm. Here for an EGD/EUS to evaluate for                        choledocholithiasis.  Providers:           Tyler Cordero MD, Valdemar Chester MD (Fellow), Gaurav Rendon (Fellow)  Medicines:           Monitored Anesthesia Care  Complications:       No immediate complications.  ____________________________________________________________________________________________________  Procedure:           Pre-Anesthesia Assessment:                       - Prior to the procedure, a History and Physical was performed, and patient                        medications and allergies were reviewed. The patient is competent. The risks                        and benefits of the procedure and the sedation options and risks were                        discussed with the patient. All questions were answered and informed consent                        was obtained. Patient identification and proposed procedure were verified by                        the physician, the nurse and the anesthesiologist in the endoscopy suite.                        Mental Status Examination: alert and oriented. Airway Examination: normal                        oropharyngeal airway and neck mobility. Respiratory Examination: clear to                auscultation. CV Examination: normal. Prophylactic Antibiotics: The patient                        does not require prophylactic antibiotics. Prior Anticoagulants: The patient                        has taken no previous anticoagulant orantiplatelet agents. ASA Grade                        Assessment: III - A patient with severe systemic disease. After reviewing the                        risks and benefits, the patient was deemed in satisfactory condition to                        undergo the procedure. The anesthesia plan was to use monitored anesthesia                        care (MAC). Immediately prior to administration of medications, the patient                        was re-assessed for adequacy to receive sedatives. Theheart rate,                        respiratory rate, oxygen saturations, blood pressure, adequacy of pulmonary                        ventilation, and response to care were monitored throughout the procedure.                        The physical status of the patient was re-assessed after the procedure.                       After obtaining informed consent, the endoscope was passed under direct                        vision. Throughout the procedure, the patient's blood pressure, pulse, and                    oxygen saturations were monitored continuously. The endoscope was introduced                        through the mouth, and advanced to the second part of duodenum. The                        endosonoscope was introduced through the mouth, and advanced to the second                        part of duodenum. The upper EUS was accomplished without difficulty. The                        patient tolerated the procedure well.                                           Findings:       EGD: :       Mildly severe esophagitis with no bleeding was found in the lower third of the esophagus.        Tortuous esophagus.       A medium-sized hiatus hernia was present.       Diffuse atrophic mucosa was found in the entire examined stomach. Biopsies were taken with a        cold forceps for histology. J-shaped stomach, a normal variant       Diffuse moderate mucosal changes characterized by erythema were found in the duodenal bulb.       The 2nd part of the duodenum was normal. The duodenal lumen in the bulb and sweep were mildly        distorted, likely due to a combination of J-shaped stomach and pancreatitis.       EUS:       EUS was performed using a radial echoendoscope. The echoendoscope was advanced to the        duodenum to image the bile duct. The bile duct measured 8mm in maximum diameter, with        relative narrowing of the duct at the level of the pancreas head. The bile duct was traced        down to the ampulla. No intrinsic stones/sludge were seen in the common bile duct.       Stones were seen in the gallbladder.                                                                                                        Impression:          EGD:           - Esophagitis.                       - Medium-sized hiatus hernia.                       - Atrophic gastritis. Biopsied.                       - J-shaped stomach, a normal variant                       - Duodenitis.                       EUS:                       - Common bile duct was mildly dilated and measured up to 8 mm. No intrinsic                        stones/sludge were seen in the bile duct. There was relative narrowing of the                        common bile duct at the level of the pancreatic head without focal pancreatic                        mass, likely due to pancreatitis.                       - Cholelithiasis.  Recommendation:      - Return patient to [facility] for ongoing care.                       - Clearliquid diet today.                       - Follow LFTs                       - Await biopsy results.                       - Surgery evaluation for elective cholecystectomy.    < end of copied text >

## 2019-03-26 NOTE — CONSULT NOTE ADULT - ASSESSMENT
84m hx CAD s/p CABG and PPM (not MR compatible), HTN, BPH, prior thyroid CA s/p thyroidectomy and resultant hypothyroidism, prior breast CA s/p L mastectomy, Prostate CA s/p radiation, melanoma s/p resection now presenting with abdominal pain found to have gallstone pancreatitis likely with passed stone, awaiting lap john likely tomorrow.

## 2019-03-26 NOTE — PROGRESS NOTE ADULT - ASSESSMENT
84 year old male with CAD s/p MI (40 yrs ago) and CABG, pacemaker, thyroid cancer s/p thyroidectomy, breast cancer s/p left mastectomy, and prostate cancer who presented to the emergency room with chief complain of intermittent, post-prandial upper abdominal pain that became worse the night before presentation, prompting him to come into the ED, found to have gallstone pancreatitis.    IMPRESSION  - Abdominal pain in the setting of acute interstitial nonsevere pancreatitis (2/2 gallstones) (Bisap score 2), CBD is 1.2cm on CT, EUS on 3.25.2019 with CBD dilation to 8mm and no intrinsic issue, narrowing of the CBD at the level of the pancreatic head without focal pancreatic mass, likely due to pancreatitis.  - Atrophic gastritis, hiatal hernia on EGD 3/25/2019  - Gallstones      RECOMMENDATIONS    - trend CBC, CMP, INR  - pending biopsy results from 3/25/2019  - surgery consult for eventual cholecystectomy   - diet and rest of care per primary team     Please call us back as needed.     Gaurav Rendon, PGY-5  GI fellow  B- [phone]/ [phone]  Please call GI fellow on call after 5pm and on weekends

## 2019-03-26 NOTE — CONSULT NOTE ADULT - SUBJECTIVE AND OBJECTIVE BOX
Hospitalist- Julien Graham MD  Pager: [phone]  If no response or off-hours, page [phone]  -------------------------------------  Patient is a 84y old  Male who presents with a chief complaint of Gallstone Pancreatitis (26 Mar 2019 12:18)    HPI:  Carlitos Maradiaga is an 84 y.o. man with history of CAD s/p MI (40 yrs ago) and CABG, pacemaker, thyroid cancer s/p thyroidectomy, breast cancer s/p left mastectomy, prostate cancer s/p radiation, nasal melanoma s/p recent skin resection who presents to the ED for 2 weeks of intermittent, post-prandial upper abdominal pain that became worse the night before presentation, prompting him to come into the ED. He was found on labs to have elevated LFTs without a significantly elevated bilirubin, initial CT a/p to have gallstones, findings suggestive of acute cholecystitis, CBD dilation and possible superimposed pancreatitis, but was ultimately diagnosed with gallstone pancreatitis without overt cholecystitis. His pacemaker was not MR compatible, and thus underwent EUS/ERCP with GI yesterday showing dilated CBD without obstructive stones or sludge. He is awaiting likely laparoscopic cholecystectomy tomorrow. He was seen by cardiology and deemed to be class II risk for low-intermediate risk procedure and presently optimized.     At present, he denies any aches/pains, has been tolerating clear liquid diet but feels dehydrated. Denies fevers/chills, no n/v/d. No cough/sob/cp/palpitations.    14 point ros otherwise negative.     Home Medications: verified with patient  aspirin 81 mg oral delayed release tablet: 1 tab(s) orally once a day (03 Jul 2014 15:04)  Flomax 0.4 mg oral capsule: 1 cap(s) orally once a day (22 Mar 2019 08:42)  levothyroxine 150 mcg (0.15 mg) oral tablet: 1 tab(s) orally once a day (03 Jul 2014 15:04)  losartan 50 mg oral tablet: 1 tab(s) orally once a day (22 Mar 2019 08:42)  Multiple Vitamins oral tablet: 1 tab(s) orally once a day (03 Jul 2014 15:04)  simvastatin 10 mg oral tablet: 1 tab(s) orally once a day (at bedtime) (03 Jul 2014 15:04)      PAST MEDICAL & SURGICAL HISTORY:  Varicose vein  Hypothyroidism  Thyroid cancer  Breast cancer  Prostate cancer  HTN (hypertension)  HLD (hyperlipidemia)  CAD (coronary artery disease)  S/P left mastectomy  S/P thyroidectomy  S/P CABG x 2      Review of Systems:   CONSTITUTIONAL: No fever, weight loss, or fatigue  EYES: No eye pain, visual disturbances, or discharge  ENMT:  No difficulty hearing, tinnitus, vertigo; No sinus or throat pain  NECK: No pain or stiffness  BREASTS: No pain, masses, or nipple discharge  RESPIRATORY: No cough, wheezing, chills or hemoptysis; No shortness of breath  CARDIOVASCULAR: No chest pain, palpitations, dizziness, or leg swelling  GASTROINTESTINAL: No abdominal or epigastric pain. No nausea, vomiting, or hematemesis; No diarrhea or constipation. No melena or hematochezia.  GENITOURINARY: No dysuria, frequency, hematuria, or incontinence  NEUROLOGICAL: No headaches, memory loss, loss of strength, numbness, or tremors  SKIN: No itching, burning, rashes, or lesions   LYMPH NODES: No enlarged glands  ENDOCRINE: No heat or cold intolerance; No hair loss  MUSCULOSKELETAL: No joint pain or swelling; No muscle, back, or extremity pain  PSYCHIATRIC: No depression, anxiety, mood swings, or difficulty sleeping  HEME/LYMPH: No easy bruising, or bleeding gums  ALLERY AND IMMUNOLOGIC: No hives or eczema    Allergies    lidocaine (Other)    Intolerances        Social History: former smoker, quit at age 25, occasional drinker, no drugs, lives alone at home, has son and grandchildren living nearby. Was functionally independent prior to admission.     FAMILY HISTORY:   Noncontributory    MEDICATIONS  (STANDING):  aspirin enteric coated 81 milliGRAM(s) Oral daily  dextrose 5% + sodium chloride 0.45% with potassium chloride 20 mEq/L 1000 milliLiter(s) (75 mL/Hr) IV Continuous <Continuous>  docusate sodium 100 milliGRAM(s) Oral three times a day  enoxaparin Injectable 40 milliGRAM(s) SubCutaneous daily  levothyroxine 150 MICROGram(s) Oral daily  losartan 50 milliGRAM(s) Oral daily  multivitamin 1 Tablet(s) Oral daily  simvastatin 10 milliGRAM(s) Oral at bedtime  tamsulosin 0.4 milliGRAM(s) Oral at bedtime    MEDICATIONS  (PRN):  acetaminophen   Tablet .. 325 milliGRAM(s) Oral every 4 hours PRN Mild Pain (1 - 3)  oxyCODONE    5 mG/acetaminophen 325 mG 1 Tablet(s) Oral every 4 hours PRN Moderate Pain (4 - 6)  oxyCODONE    5 mG/acetaminophen 325 mG 2 Tablet(s) Oral every 6 hours PRN Severe Pain (7 - 10)  senna 2 Tablet(s) Oral at bedtime PRN Constipation      Vital Signs Last 24 Hrs  T(C): 36.6 (26 Mar 2019 12:41), Max: 36.7 (25 Mar 2019 21:06)  T(F): 97.8 (26 Mar 2019 12:41), Max: 98.1 (25 Mar 2019 21:06)  HR: 62 (26 Mar 2019 12:41) (62 - 87)  BP: 112/75 (26 Mar 2019 12:41) (94/61 - 170/91)  BP(mean): --  RR: 18 (26 Mar 2019 12:41) (18 - 18)  SpO2: 97% (26 Mar 2019 12:41) (95% - 97%)  CAPILLARY BLOOD GLUCOSE    PHYSICAL EXAM:  GENERAL: NAD, well-developed  HEAD:  Atraumatic, Normocephalic  EYES: EOMI, PERRLA, conjunctiva and sclera clear  NECK: Supple, No JVD  CHEST/LUNG: Clear to auscultation bilaterally; No wheeze  HEART: Regular rate and rhythm; No murmurs, rubs, or gallops  ABDOMEN: Soft, Nontender, Nondistended; Bowel sounds present  EXTREMITIES:  2+ Peripheral Pulses, No clubbing, cyanosis, or edema  PSYCH: AAOx3  NEUROLOGY: non-focal  SKIN: No rashes or lesions    LABS:                        11.4   5.7   )-----------( 227      ( 26 Mar 2019 13:45 )             32.9     03-26    135  |  102  |  8   ----------------------------<  100<H>  4.1   |  21<L>  |  0.94    Ca    9.1      26 Mar 2019 12:15  Phos  2.4     03-26  Mg     1.8     03-26    TPro  6.4  /  Alb  3.5  /  TBili  0.4  /  DBili  0.1  /  AST  43<H>  /  ALT  140<H>  /  AlkPhos  201<H>  03-26    < from: CT Abdomen and Pelvis w/ IV Cont (03.22.19 @ 05:29) >  IMPRESSION:     Suspect acute cholecystitis. Recommend clinical correlation and   additional imaging for further evaluation.     Mild to moderate intrahepatic biliary dilatation. Common bile duct   dilatation. If there is clinical suspicion for choledocholithiasis,   MRCP/MRI may be obtained for further evaluation.    Nonspecific mild peripancreatic stranding. Recommend correlation with   serum lipase to assess superimposed acute pancreatitis.    Decreased hepatic enhancement near the gallbladder fossa, which may be   reactive although hepatic extension of gallbladder inflammation cannot be   excluded.    Borderline dilated, fluid-filled appendix without surrounding   inflammatory change. Clinical question is advised to assess acute   appendicitis.     Prominent bladder wall, which may be due to partial distention. Recommend   correlation with urinalysis to assess urinary tract infection.    Age-indeterminate compression fractures of the spine. Recommend   comparison to a previous outside study to assess stability.    < end of copied text >

## 2019-03-26 NOTE — CONSULT NOTE ADULT - PROBLEM SELECTOR RECOMMENDATION 9
LIkely 2/2 passed stone. Pt planned for lap john tomorrow  - no abd pain at present  - continue CLD for now, NPO p MN  - remainder of care as per surgery Home Medications: verified with patient  aspirin 81 mg oral delayed release tablet: 1 tab(s) orally once a day (03 Jul 2014 15:04)  Flomax 0.4 mg oral capsule: 1 cap(s) orally once a day (22 Mar 2019 08:42)  levothyroxine 150 mcg (0.15 mg) oral tablet: 1 tab(s) orally once a day (03 Jul 2014 15:04)  losartan 50 mg oral tablet: 1 tab(s) orally once a day (22 Mar 2019 08:42)  Multiple Vitamins oral tablet: 1 tab(s) orally once a day (03 Jul 2014 15:04)  simvastatin 10 mg oral tablet: 1 tab(s) orally once a day (at bedtime) (03 Jul 2014 15:04)

## 2019-03-26 NOTE — PROGRESS NOTE ADULT - SUBJECTIVE AND OBJECTIVE BOX
ACS DAILY PROGRESS NOTE:       SUBJECTIVE/ROS: Patient feels well- had EUS yesterday- CBD 8mm no stones  Denies nausea, vomiting, chest pain, shortness of breath         MEDICATIONS  (STANDING):  aspirin enteric coated 81 milliGRAM(s) Oral daily  docusate sodium 100 milliGRAM(s) Oral three times a day  enoxaparin Injectable 40 milliGRAM(s) SubCutaneous daily  levothyroxine 150 MICROGram(s) Oral daily  losartan 50 milliGRAM(s) Oral daily  multivitamin 1 Tablet(s) Oral daily  simvastatin 10 milliGRAM(s) Oral at bedtime  tamsulosin 0.4 milliGRAM(s) Oral at bedtime    MEDICATIONS  (PRN):  acetaminophen   Tablet .. 325 milliGRAM(s) Oral every 4 hours PRN Mild Pain (1 - 3)  oxyCODONE    5 mG/acetaminophen 325 mG 1 Tablet(s) Oral every 4 hours PRN Moderate Pain (4 - 6)  oxyCODONE    5 mG/acetaminophen 325 mG 2 Tablet(s) Oral every 6 hours PRN Severe Pain (7 - 10)  senna 2 Tablet(s) Oral at bedtime PRN Constipation      OBJECTIVE:    Vital Signs Last 24 Hrs  T(C): 36.7 (26 Mar 2019 04:59), Max: 36.7 (25 Mar 2019 21:06)  T(F): 98 (26 Mar 2019 04:59), Max: 98.1 (25 Mar 2019 21:06)  HR: 71 (26 Mar 2019 04:59) (63 - 72)  BP: 165/89 (26 Mar 2019 05:50) (138/92 - 170/91)  BP(mean): --  RR: 18 (26 Mar 2019 04:59) (18 - 18)  SpO2: 95% (26 Mar 2019 04:59) (95% - 97%)        I&O's Detail    25 Mar 2019 07:01  -  26 Mar 2019 07:00  --------------------------------------------------------  IN:    dextrose 5% + sodium chloride 0.45% with potassium chloride 20 mEq/L: 2000 mL    IV PiggyBack: 250 mL  Total IN: 2250 mL    OUT:    Voided: 525 mL  Total OUT: 525 mL    Total NET: 1725 mL          Daily Height in cm: 177.8 (25 Mar 2019 18:12)    Daily Weight in k.5 (25 Mar 2019 11:43)    LABS:                        10.0   5.94  )-----------( 197      ( 25 Mar 2019 09:18 )             30.0     03-25    137  |  106  |  7   ----------------------------<  153<H>  4.2   |  22  |  0.82    Ca    8.6      25 Mar 2019 07:15  Phos  2.5     -  Mg     1.7         TPro  5.7<L>  /  Alb  3.0<L>  /  TBili  0.4  /  DBili  0.1  /  AST  58<H>  /  ALT  164<H>  /  AlkPhos  205<H>  25    PT/INR - ( 24 Mar 2019 09:22 )   PT: 13.5 sec;   INR: 1.17 ratio         PTT - ( 24 Mar 2019 09:22 )  PTT:30.7 sec            Physical Exam:  Gen: Laying in bed, NAD  Resp: Unlabored breathing  Abd: soft, NTND, no rebound or guarding  Ext: WWP  Skin: No rashes

## 2019-03-26 NOTE — PROGRESS NOTE ADULT - ATTENDING COMMENTS
As above    Pt doing well after EGD/EUS on 3/25/19 with findings of atrophic gastritis (biopsied), mildly dilated common bile duct without choledocholithiasis, likely due to pancreatitis.  No abdominal pain.  Abnormal LFTs improving.      Plan:    Follow LFTs  Await gastric biopsy results.  Cholecystectomy per surgery.    Will sign off / call for questions/concerns.

## 2019-03-26 NOTE — PROGRESS NOTE ADULT - ATTENDING COMMENTS
seen and examined 03-26-19 @ 5504    afeb  AVSS  soft / NT / ND    WBC = 6  LFTs - slightly improved from yesterday    gallstone pancreatitis s/p EUS (no CBD stones) / gastric biopsy on 3/25  -booked for laparoscopic cholecystectomy tomorrow @ 1400

## 2019-03-27 ENCOUNTER — APPOINTMENT (OUTPATIENT)
Dept: CARDIOLOGY | Facility: CLINIC | Age: 84
End: 2019-03-27

## 2019-03-27 LAB
ALBUMIN SERPL ELPH-MCNC: 2.9 G/DL — LOW (ref 3.3–5)
ALP SERPL-CCNC: 175 U/L — HIGH (ref 40–120)
ALT FLD-CCNC: 101 U/L — HIGH (ref 10–45)
ANION GAP SERPL CALC-SCNC: 12 MMOL/L — SIGNIFICANT CHANGE UP (ref 5–17)
AST SERPL-CCNC: 35 U/L — SIGNIFICANT CHANGE UP (ref 10–40)
BILIRUB DIRECT SERPL-MCNC: 0.1 MG/DL — SIGNIFICANT CHANGE UP (ref 0–0.2)
BILIRUB INDIRECT FLD-MCNC: 0.2 MG/DL — SIGNIFICANT CHANGE UP (ref 0.2–1)
BILIRUB SERPL-MCNC: 0.3 MG/DL — SIGNIFICANT CHANGE UP (ref 0.2–1.2)
BLD GP AB SCN SERPL QL: NEGATIVE — SIGNIFICANT CHANGE UP
BUN SERPL-MCNC: 8 MG/DL — SIGNIFICANT CHANGE UP (ref 7–23)
CALCIUM SERPL-MCNC: 9.2 MG/DL — SIGNIFICANT CHANGE UP (ref 8.4–10.5)
CHLORIDE SERPL-SCNC: 107 MMOL/L — SIGNIFICANT CHANGE UP (ref 96–108)
CO2 SERPL-SCNC: 21 MMOL/L — LOW (ref 22–31)
CREAT SERPL-MCNC: 0.91 MG/DL — SIGNIFICANT CHANGE UP (ref 0.5–1.3)
GLUCOSE BLDC GLUCOMTR-MCNC: 74 MG/DL — SIGNIFICANT CHANGE UP (ref 70–99)
GLUCOSE BLDC GLUCOMTR-MCNC: 76 MG/DL — SIGNIFICANT CHANGE UP (ref 70–99)
GLUCOSE SERPL-MCNC: 79 MG/DL — SIGNIFICANT CHANGE UP (ref 70–99)
HBA1C BLD-MCNC: 5.4 % — SIGNIFICANT CHANGE UP (ref 4–5.6)
HCT VFR BLD CALC: 33.5 % — LOW (ref 39–50)
HGB BLD-MCNC: 11.2 G/DL — LOW (ref 13–17)
MAGNESIUM SERPL-MCNC: 1.8 MG/DL — SIGNIFICANT CHANGE UP (ref 1.6–2.6)
MCHC RBC-ENTMCNC: 32.7 PG — SIGNIFICANT CHANGE UP (ref 27–34)
MCHC RBC-ENTMCNC: 33.4 GM/DL — SIGNIFICANT CHANGE UP (ref 32–36)
MCV RBC AUTO: 97.7 FL — SIGNIFICANT CHANGE UP (ref 80–100)
PHOSPHATE SERPL-MCNC: 3.5 MG/DL — SIGNIFICANT CHANGE UP (ref 2.5–4.5)
PLATELET # BLD AUTO: 225 K/UL — SIGNIFICANT CHANGE UP (ref 150–400)
POTASSIUM SERPL-MCNC: 3.8 MMOL/L — SIGNIFICANT CHANGE UP (ref 3.5–5.3)
POTASSIUM SERPL-SCNC: 3.8 MMOL/L — SIGNIFICANT CHANGE UP (ref 3.5–5.3)
PROT SERPL-MCNC: 6.1 G/DL — SIGNIFICANT CHANGE UP (ref 6–8.3)
RBC # BLD: 3.43 M/UL — LOW (ref 4.2–5.8)
RBC # FLD: 12.4 % — SIGNIFICANT CHANGE UP (ref 10.3–14.5)
RH IG SCN BLD-IMP: NEGATIVE — SIGNIFICANT CHANGE UP
SODIUM SERPL-SCNC: 140 MMOL/L — SIGNIFICANT CHANGE UP (ref 135–145)
WBC # BLD: 4.39 K/UL — SIGNIFICANT CHANGE UP (ref 3.8–10.5)
WBC # FLD AUTO: 4.39 K/UL — SIGNIFICANT CHANGE UP (ref 3.8–10.5)

## 2019-03-27 PROCEDURE — 99231 SBSQ HOSP IP/OBS SF/LOW 25: CPT | Mod: 57

## 2019-03-27 PROCEDURE — 47562 LAPAROSCOPIC CHOLECYSTECTOMY: CPT | Mod: 22,GC

## 2019-03-27 PROCEDURE — 99233 SBSQ HOSP IP/OBS HIGH 50: CPT

## 2019-03-27 RX ORDER — SIMVASTATIN 20 MG/1
10 TABLET, FILM COATED ORAL AT BEDTIME
Qty: 0 | Refills: 0 | Status: DISCONTINUED | OUTPATIENT
Start: 2019-03-27 | End: 2019-03-29

## 2019-03-27 RX ORDER — ACETAMINOPHEN 500 MG
975 TABLET ORAL EVERY 6 HOURS
Qty: 0 | Refills: 0 | Status: DISCONTINUED | OUTPATIENT
Start: 2019-03-27 | End: 2019-03-29

## 2019-03-27 RX ORDER — OXYCODONE HYDROCHLORIDE 5 MG/1
10 TABLET ORAL EVERY 4 HOURS
Qty: 0 | Refills: 0 | Status: DISCONTINUED | OUTPATIENT
Start: 2019-03-27 | End: 2019-03-28

## 2019-03-27 RX ORDER — ASPIRIN/CALCIUM CARB/MAGNESIUM 324 MG
81 TABLET ORAL DAILY
Qty: 0 | Refills: 0 | Status: DISCONTINUED | OUTPATIENT
Start: 2019-03-27 | End: 2019-03-29

## 2019-03-27 RX ORDER — LOSARTAN POTASSIUM 100 MG/1
50 TABLET, FILM COATED ORAL DAILY
Qty: 0 | Refills: 0 | Status: DISCONTINUED | OUTPATIENT
Start: 2019-03-27 | End: 2019-03-29

## 2019-03-27 RX ORDER — PIPERACILLIN AND TAZOBACTAM 4; .5 G/20ML; G/20ML
3.38 INJECTION, POWDER, LYOPHILIZED, FOR SOLUTION INTRAVENOUS EVERY 12 HOURS
Qty: 0 | Refills: 0 | Status: COMPLETED | OUTPATIENT
Start: 2019-03-27 | End: 2019-03-27

## 2019-03-27 RX ORDER — TAMSULOSIN HYDROCHLORIDE 0.4 MG/1
0.4 CAPSULE ORAL AT BEDTIME
Qty: 0 | Refills: 0 | Status: DISCONTINUED | OUTPATIENT
Start: 2019-03-27 | End: 2019-03-29

## 2019-03-27 RX ORDER — DOCUSATE SODIUM 100 MG
100 CAPSULE ORAL THREE TIMES A DAY
Qty: 0 | Refills: 0 | Status: DISCONTINUED | OUTPATIENT
Start: 2019-03-27 | End: 2019-03-29

## 2019-03-27 RX ORDER — HYDROMORPHONE HYDROCHLORIDE 2 MG/ML
0.25 INJECTION INTRAMUSCULAR; INTRAVENOUS; SUBCUTANEOUS
Qty: 0 | Refills: 0 | Status: DISCONTINUED | OUTPATIENT
Start: 2019-03-27 | End: 2019-03-27

## 2019-03-27 RX ORDER — OXYCODONE HYDROCHLORIDE 5 MG/1
5 TABLET ORAL EVERY 4 HOURS
Qty: 0 | Refills: 0 | Status: DISCONTINUED | OUTPATIENT
Start: 2019-03-27 | End: 2019-03-28

## 2019-03-27 RX ORDER — ENOXAPARIN SODIUM 100 MG/ML
40 INJECTION SUBCUTANEOUS DAILY
Qty: 0 | Refills: 0 | Status: DISCONTINUED | OUTPATIENT
Start: 2019-03-27 | End: 2019-03-29

## 2019-03-27 RX ORDER — LEVOTHYROXINE SODIUM 125 MCG
150 TABLET ORAL DAILY
Qty: 0 | Refills: 0 | Status: DISCONTINUED | OUTPATIENT
Start: 2019-03-27 | End: 2019-03-29

## 2019-03-27 RX ORDER — ONDANSETRON 8 MG/1
4 TABLET, FILM COATED ORAL ONCE
Qty: 0 | Refills: 0 | Status: DISCONTINUED | OUTPATIENT
Start: 2019-03-27 | End: 2019-03-27

## 2019-03-27 RX ORDER — SENNA PLUS 8.6 MG/1
2 TABLET ORAL AT BEDTIME
Qty: 0 | Refills: 0 | Status: DISCONTINUED | OUTPATIENT
Start: 2019-03-27 | End: 2019-03-29

## 2019-03-27 RX ADMIN — Medication 100 MILLIGRAM(S): at 22:53

## 2019-03-27 RX ADMIN — Medication 1 TABLET(S): at 22:53

## 2019-03-27 RX ADMIN — Medication 150 MICROGRAM(S): at 22:54

## 2019-03-27 RX ADMIN — Medication 975 MILLIGRAM(S): at 23:20

## 2019-03-27 RX ADMIN — Medication 150 MICROGRAM(S): at 06:43

## 2019-03-27 RX ADMIN — SIMVASTATIN 10 MILLIGRAM(S): 20 TABLET, FILM COATED ORAL at 22:53

## 2019-03-27 RX ADMIN — PIPERACILLIN AND TAZOBACTAM 25 GRAM(S): 4; .5 INJECTION, POWDER, LYOPHILIZED, FOR SOLUTION INTRAVENOUS at 21:08

## 2019-03-27 RX ADMIN — Medication 81 MILLIGRAM(S): at 22:53

## 2019-03-27 RX ADMIN — ENOXAPARIN SODIUM 40 MILLIGRAM(S): 100 INJECTION SUBCUTANEOUS at 13:14

## 2019-03-27 RX ADMIN — LOSARTAN POTASSIUM 50 MILLIGRAM(S): 100 TABLET, FILM COATED ORAL at 06:43

## 2019-03-27 RX ADMIN — Medication 975 MILLIGRAM(S): at 22:50

## 2019-03-27 RX ADMIN — TAMSULOSIN HYDROCHLORIDE 0.4 MILLIGRAM(S): 0.4 CAPSULE ORAL at 22:53

## 2019-03-27 NOTE — PROGRESS NOTE ADULT - PROBLEM SELECTOR PLAN 9
Home Medications: verified with patient's list  aspirin 81 mg oral delayed release tablet: 1 tab(s) orally once a day (03 Jul 2014 15:04)  Flomax 0.4 mg oral capsule: 1 cap(s) orally once a day (22 Mar 2019 08:42)  levothyroxine 150 mcg (0.15 mg) oral tablet: 1 tab(s) orally once a day (03 Jul 2014 15:04)  losartan 50 mg oral tablet: 1 tab(s) orally once a day (22 Mar 2019 08:42)  Multiple Vitamins oral tablet: 1 tab(s) orally once a day (03 Jul 2014 15:04)  simvastatin 10 mg oral tablet: 1 tab(s) orally once a day (at bedtime) (03 Jul 2014 15:04).

## 2019-03-27 NOTE — PROGRESS NOTE ADULT - ASSESSMENT
84m hx CAD s/p CABG and PPM (not MR compatible), HTN, BPH, prior thyroid CA s/p thyroidectomy and resultant hypothyroidism, prior breast CA s/p L mastectomy, Prostate CA s/p radiation, melanoma s/p resection now presenting with abdominal pain found to have gallstone pancreatitis likely with passed stone, awaiting lap john today

## 2019-03-27 NOTE — PROGRESS NOTE ADULT - ATTENDING COMMENTS
seen and examined 03-27-19 @ 1400    afeb  AVSS  soft / NT / ND    WBC = 4  LFTs - elevated but improving    gallstone pancreatitis  -laparoscopic cholecystectomy today @ 1400

## 2019-03-27 NOTE — BRIEF OPERATIVE NOTE - OPERATION/FINDINGS
Gallbladder densely adherent to duodenum; subtotal cholecystectomy performed, removing anterior wall to level of dense adhesions. Copious small gallstones encountered, with spillage. Irrigated with 2 L of fluid, 19 fr ashlyn drain left in gallbladder remnant.

## 2019-03-27 NOTE — PROGRESS NOTE ADULT - ASSESSMENT
84 y.o. 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 Initial lab work and radiology consistent with gallstone pancreatitis s/p EUS 3/26    Plan:   - NPO/IVF  - OR today for cholecystectomy  - cardiology cleared for procedure and surgery  - Continue home medications  - lovenox for DVT ppx    ACS Surgery  [phone]

## 2019-03-27 NOTE — PROGRESS NOTE ADULT - PROBLEM SELECTOR PLAN 4
Slightly elevated -170's today  - if remains persistently elevated postoperatively, willl likely increase losartan dose  - continue at losartan 50mg po daily for now

## 2019-03-27 NOTE — PROGRESS NOTE ADULT - PROBLEM SELECTOR PLAN 3
s/p PPM for bradycardia (unclear etiology) No active issues, chest pain free  - continue aspirin, statin, ARB  - likely not on beta blocker due to bradycardia

## 2019-03-27 NOTE — PROGRESS NOTE ADULT - SUBJECTIVE AND OBJECTIVE BOX
Surgery Progress Note    S: Patient seen and examined. No acute events overnight. Patient was tolerating CLD without n/v yesterday. NPO this AM for planned cholecystectomy today. No pain this AM.     O:  Vital Signs Last 24 Hrs  T(C): 36.6 (27 Mar 2019 05:52), Max: 36.7 (26 Mar 2019 16:41)  T(F): 97.9 (27 Mar 2019 05:52), Max: 98.1 (26 Mar 2019 16:41)  HR: 62 (27 Mar 2019 05:52) (62 - 87)  BP: 177/96 (27 Mar 2019 05:52) (94/61 - 177/99)  BP(mean): --  RR: 18 (27 Mar 2019 05:52) (18 - 18)  SpO2: 96% (27 Mar 2019 05:52) (96% - 97%)    I&O's Detail    25 Mar 2019 07:01  -  26 Mar 2019 07:00  --------------------------------------------------------  IN:    dextrose 5% + sodium chloride 0.45% with potassium chloride 20 mEq/L: 2000 mL    Solution: 250 mL  Total IN: 2250 mL    OUT:    Voided: 525 mL  Total OUT: 525 mL    Total NET: 1725 mL      26 Mar 2019 07:01  -  27 Mar 2019 06:54  --------------------------------------------------------  IN:    Oral Fluid: 860 mL    Solution: 250 mL  Total IN: 1110 mL    OUT:    Voided: 1675 mL  Total OUT: 1675 mL    Total NET: -565 mL          MEDICATIONS  (STANDING):  aspirin enteric coated 81 milliGRAM(s) Oral daily  dextrose 50% Injectable 12.5 Gram(s) IV Push once  dextrose 50% Injectable 25 Gram(s) IV Push once  dextrose 50% Injectable 25 Gram(s) IV Push once  docusate sodium 100 milliGRAM(s) Oral three times a day  enoxaparin Injectable 40 milliGRAM(s) SubCutaneous daily  insulin lispro (HumaLOG) corrective regimen sliding scale   SubCutaneous Before meals and at bedtime  lactated ringers. 1000 milliLiter(s) (75 mL/Hr) IV Continuous <Continuous>  levothyroxine 150 MICROGram(s) Oral daily  losartan 50 milliGRAM(s) Oral daily  multivitamin 1 Tablet(s) Oral daily  simvastatin 10 milliGRAM(s) Oral at bedtime  tamsulosin 0.4 milliGRAM(s) Oral at bedtime    MEDICATIONS  (PRN):  acetaminophen   Tablet .. 325 milliGRAM(s) Oral every 4 hours PRN Mild Pain (1 - 3)  dextrose 40% Gel 15 Gram(s) Oral once PRN Blood Glucose LESS THAN 70 milliGRAM(s)/deciliter  glucagon  Injectable 1 milliGRAM(s) IntraMuscular once PRN Glucose LESS THAN 70 milligrams/deciliter  oxyCODONE    5 mG/acetaminophen 325 mG 1 Tablet(s) Oral every 4 hours PRN Moderate Pain (4 - 6)  oxyCODONE    5 mG/acetaminophen 325 mG 2 Tablet(s) Oral every 6 hours PRN Severe Pain (7 - 10)  senna 2 Tablet(s) Oral at bedtime PRN Constipation                            11.4   5.7   )-----------( 227      ( 26 Mar 2019 13:45 )             32.9       03-26    135  |  102  |  8   ----------------------------<  100<H>  4.1   |  21<L>  |  0.94    Ca    9.1      26 Mar 2019 12:15  Phos  2.4     03-26  Mg     1.8     03-26    TPro  6.4  /  Alb  3.5  /  TBili  0.4  /  DBili  0.1  /  AST  43<H>  /  ALT  140<H>  /  AlkPhos  201<H>  03-26      Physical Exam:  Gen: Laying in bed, NAD  Resp: Unlabored breathing  Abd: soft, NTND, no rebound or guarding  Ext: WWP  Skin: No rashes

## 2019-03-27 NOTE — PROGRESS NOTE ADULT - SUBJECTIVE AND OBJECTIVE BOX
Hospitalist- Julien Graham MD  Pager: [phone]  If no response or off-hours, page [phone]  -------------------------------------  Patient is a 84y old  Male who presents with a chief complaint of Gallstone Pancreatitis (27 Mar 2019 06:54)  Subjective: no acute events overnight. No new aches/pains, no nvd, no sob/cp/palpitations. No fevers/chills    14 point ros otherwise negative.     VITAL SIGNS:  Vital Signs Last 24 Hrs  T(C): 36.6 (27 Mar 2019 10:15), Max: 36.7 (26 Mar 2019 16:41)  T(F): 97.8 (27 Mar 2019 10:15), Max: 98.1 (26 Mar 2019 16:41)  HR: 68 (27 Mar 2019 10:15) (62 - 69)  BP: 160/80 (27 Mar 2019 10:15) (156/93 - 177/99)  BP(mean): --  RR: 18 (27 Mar 2019 10:15) (18 - 18)  SpO2: 96% (27 Mar 2019 10:15) (96% - 96%)      PHYSICAL EXAM:     GENERAL: no acute distress  HEENT: PERRLA, EOMI, moist oropharynx   RESPIRATORY: CTAB, no w/c   CARDIOVASCULAR: RRR, no murmurs, gallops, rubs  ABDOMINAL: soft, non-tender, non-distended, positive bowel sounds   EXTREMITIES: no clubbing, cyanosis, or edema  NEUROLOGICAL: alert and oriented x 3, non-focal  SKIN: no rashes or lesions   MUSCULOSKELETAL: no gross joint deformity                          11.2   4.39  )-----------( 225      ( 27 Mar 2019 09:54 )             33.5     03-27    140  |  107  |  8   ----------------------------<  79  3.8   |  21<L>  |  0.91    Ca    9.2      27 Mar 2019 07:16  Phos  3.5     03-27  Mg     1.8     03-27    TPro  6.1  /  Alb  2.9<L>  /  TBili  0.3  /  DBili  0.1  /  AST  35  /  ALT  101<H>  /  AlkPhos  175<H>  03-27      CAPILLARY BLOOD GLUCOSE      POCT Blood Glucose.: 74 mg/dL (27 Mar 2019 12:04)  POCT Blood Glucose.: 76 mg/dL (27 Mar 2019 12:00)  POCT Blood Glucose.: 103 mg/dL (26 Mar 2019 21:53)  POCT Blood Glucose.: 76 mg/dL (26 Mar 2019 18:28)      MEDICATIONS  (STANDING):  aspirin enteric coated 81 milliGRAM(s) Oral daily  dextrose 50% Injectable 12.5 Gram(s) IV Push once  dextrose 50% Injectable 25 Gram(s) IV Push once  dextrose 50% Injectable 25 Gram(s) IV Push once  docusate sodium 100 milliGRAM(s) Oral three times a day  enoxaparin Injectable 40 milliGRAM(s) SubCutaneous daily  insulin lispro (HumaLOG) corrective regimen sliding scale   SubCutaneous Before meals and at bedtime  lactated ringers. 1000 milliLiter(s) (75 mL/Hr) IV Continuous <Continuous>  levothyroxine 150 MICROGram(s) Oral daily  losartan 50 milliGRAM(s) Oral daily  multivitamin 1 Tablet(s) Oral daily  simvastatin 10 milliGRAM(s) Oral at bedtime  tamsulosin 0.4 milliGRAM(s) Oral at bedtime    MEDICATIONS  (PRN):  acetaminophen   Tablet .. 325 milliGRAM(s) Oral every 4 hours PRN Mild Pain (1 - 3)  dextrose 40% Gel 15 Gram(s) Oral once PRN Blood Glucose LESS THAN 70 milliGRAM(s)/deciliter  glucagon  Injectable 1 milliGRAM(s) IntraMuscular once PRN Glucose LESS THAN 70 milligrams/deciliter  oxyCODONE    5 mG/acetaminophen 325 mG 1 Tablet(s) Oral every 4 hours PRN Moderate Pain (4 - 6)  oxyCODONE    5 mG/acetaminophen 325 mG 2 Tablet(s) Oral every 6 hours PRN Severe Pain (7 - 10)  senna 2 Tablet(s) Oral at bedtime PRN Constipation

## 2019-03-28 ENCOUNTER — RESULT REVIEW (OUTPATIENT)
Age: 84
End: 2019-03-28

## 2019-03-28 ENCOUNTER — TRANSCRIPTION ENCOUNTER (OUTPATIENT)
Age: 84
End: 2019-03-28

## 2019-03-28 DIAGNOSIS — D72.829 ELEVATED WHITE BLOOD CELL COUNT, UNSPECIFIED: ICD-10-CM

## 2019-03-28 LAB
ALBUMIN SERPL ELPH-MCNC: 2.8 G/DL — LOW (ref 3.3–5)
ALP SERPL-CCNC: 142 U/L — HIGH (ref 40–120)
ALT FLD-CCNC: 87 U/L — HIGH (ref 10–45)
ANION GAP SERPL CALC-SCNC: 14 MMOL/L — SIGNIFICANT CHANGE UP (ref 5–17)
AST SERPL-CCNC: 40 U/L — SIGNIFICANT CHANGE UP (ref 10–40)
BILIRUB DIRECT SERPL-MCNC: 0.1 MG/DL — SIGNIFICANT CHANGE UP (ref 0–0.2)
BILIRUB INDIRECT FLD-MCNC: 0.2 MG/DL — SIGNIFICANT CHANGE UP (ref 0.2–1)
BILIRUB SERPL-MCNC: 0.3 MG/DL — SIGNIFICANT CHANGE UP (ref 0.2–1.2)
BUN SERPL-MCNC: 13 MG/DL — SIGNIFICANT CHANGE UP (ref 7–23)
CALCIUM SERPL-MCNC: 8.5 MG/DL — SIGNIFICANT CHANGE UP (ref 8.4–10.5)
CHLORIDE SERPL-SCNC: 102 MMOL/L — SIGNIFICANT CHANGE UP (ref 96–108)
CO2 SERPL-SCNC: 22 MMOL/L — SIGNIFICANT CHANGE UP (ref 22–31)
CREAT SERPL-MCNC: 1.09 MG/DL — SIGNIFICANT CHANGE UP (ref 0.5–1.3)
GLUCOSE SERPL-MCNC: 102 MG/DL — HIGH (ref 70–99)
HCT VFR BLD CALC: 29.9 % — LOW (ref 39–50)
HGB BLD-MCNC: 10 G/DL — LOW (ref 13–17)
MAGNESIUM SERPL-MCNC: 1.7 MG/DL — SIGNIFICANT CHANGE UP (ref 1.6–2.6)
MCHC RBC-ENTMCNC: 33.3 PG — SIGNIFICANT CHANGE UP (ref 27–34)
MCHC RBC-ENTMCNC: 33.4 GM/DL — SIGNIFICANT CHANGE UP (ref 32–36)
MCV RBC AUTO: 99.7 FL — SIGNIFICANT CHANGE UP (ref 80–100)
PHOSPHATE SERPL-MCNC: 4.6 MG/DL — HIGH (ref 2.5–4.5)
PLATELET # BLD AUTO: 216 K/UL — SIGNIFICANT CHANGE UP (ref 150–400)
POTASSIUM SERPL-MCNC: 3.7 MMOL/L — SIGNIFICANT CHANGE UP (ref 3.5–5.3)
POTASSIUM SERPL-SCNC: 3.7 MMOL/L — SIGNIFICANT CHANGE UP (ref 3.5–5.3)
PROT SERPL-MCNC: 5.7 G/DL — LOW (ref 6–8.3)
RBC # BLD: 3 M/UL — LOW (ref 4.2–5.8)
RBC # FLD: 12.7 % — SIGNIFICANT CHANGE UP (ref 10.3–14.5)
SODIUM SERPL-SCNC: 138 MMOL/L — SIGNIFICANT CHANGE UP (ref 135–145)
WBC # BLD: 11.1 K/UL — HIGH (ref 3.8–10.5)
WBC # FLD AUTO: 11.1 K/UL — HIGH (ref 3.8–10.5)

## 2019-03-28 PROCEDURE — 88304 TISSUE EXAM BY PATHOLOGIST: CPT | Mod: 26

## 2019-03-28 PROCEDURE — 99233 SBSQ HOSP IP/OBS HIGH 50: CPT

## 2019-03-28 PROCEDURE — 99024 POSTOP FOLLOW-UP VISIT: CPT

## 2019-03-28 RX ORDER — SODIUM CHLORIDE 9 MG/ML
1000 INJECTION, SOLUTION INTRAVENOUS ONCE
Qty: 0 | Refills: 0 | Status: COMPLETED | OUTPATIENT
Start: 2019-03-28 | End: 2019-03-28

## 2019-03-28 RX ORDER — POTASSIUM CHLORIDE 20 MEQ
20 PACKET (EA) ORAL ONCE
Qty: 0 | Refills: 0 | Status: COMPLETED | OUTPATIENT
Start: 2019-03-28 | End: 2019-03-28

## 2019-03-28 RX ORDER — OXYCODONE HYDROCHLORIDE 5 MG/1
5 TABLET ORAL EVERY 8 HOURS
Qty: 0 | Refills: 0 | Status: DISCONTINUED | OUTPATIENT
Start: 2019-03-28 | End: 2019-03-29

## 2019-03-28 RX ORDER — SODIUM CHLORIDE 9 MG/ML
500 INJECTION, SOLUTION INTRAVENOUS ONCE
Qty: 0 | Refills: 0 | Status: COMPLETED | OUTPATIENT
Start: 2019-03-28 | End: 2019-03-28

## 2019-03-28 RX ORDER — MAGNESIUM SULFATE 500 MG/ML
2 VIAL (ML) INJECTION ONCE
Qty: 0 | Refills: 0 | Status: COMPLETED | OUTPATIENT
Start: 2019-03-28 | End: 2019-03-28

## 2019-03-28 RX ORDER — TRAMADOL HYDROCHLORIDE 50 MG/1
25 TABLET ORAL THREE TIMES A DAY
Qty: 0 | Refills: 0 | Status: DISCONTINUED | OUTPATIENT
Start: 2019-03-28 | End: 2019-03-29

## 2019-03-28 RX ADMIN — Medication 50 GRAM(S): at 13:06

## 2019-03-28 RX ADMIN — TRAMADOL HYDROCHLORIDE 25 MILLIGRAM(S): 50 TABLET ORAL at 15:44

## 2019-03-28 RX ADMIN — Medication 975 MILLIGRAM(S): at 05:30

## 2019-03-28 RX ADMIN — SODIUM CHLORIDE 500 MILLILITER(S): 9 INJECTION, SOLUTION INTRAVENOUS at 14:59

## 2019-03-28 RX ADMIN — Medication 975 MILLIGRAM(S): at 13:01

## 2019-03-28 RX ADMIN — Medication 975 MILLIGRAM(S): at 12:55

## 2019-03-28 RX ADMIN — ENOXAPARIN SODIUM 40 MILLIGRAM(S): 100 INJECTION SUBCUTANEOUS at 12:57

## 2019-03-28 RX ADMIN — Medication 975 MILLIGRAM(S): at 17:46

## 2019-03-28 RX ADMIN — LOSARTAN POTASSIUM 50 MILLIGRAM(S): 100 TABLET, FILM COATED ORAL at 05:31

## 2019-03-28 RX ADMIN — SIMVASTATIN 10 MILLIGRAM(S): 20 TABLET, FILM COATED ORAL at 21:14

## 2019-03-28 RX ADMIN — Medication 20 MILLIEQUIVALENT(S): at 13:06

## 2019-03-28 RX ADMIN — Medication 975 MILLIGRAM(S): at 23:04

## 2019-03-28 RX ADMIN — Medication 100 MILLIGRAM(S): at 05:31

## 2019-03-28 RX ADMIN — TRAMADOL HYDROCHLORIDE 25 MILLIGRAM(S): 50 TABLET ORAL at 15:43

## 2019-03-28 RX ADMIN — Medication 81 MILLIGRAM(S): at 12:56

## 2019-03-28 RX ADMIN — Medication 1 TABLET(S): at 12:57

## 2019-03-28 RX ADMIN — Medication 100 MILLIGRAM(S): at 21:14

## 2019-03-28 RX ADMIN — Medication 975 MILLIGRAM(S): at 06:00

## 2019-03-28 RX ADMIN — SODIUM CHLORIDE 1000 MILLILITER(S): 9 INJECTION, SOLUTION INTRAVENOUS at 04:49

## 2019-03-28 RX ADMIN — Medication 100 MILLIGRAM(S): at 13:05

## 2019-03-28 RX ADMIN — TAMSULOSIN HYDROCHLORIDE 0.4 MILLIGRAM(S): 0.4 CAPSULE ORAL at 21:13

## 2019-03-28 NOTE — PHYSICAL THERAPY INITIAL EVALUATION ADULT - CRITERIA FOR SKILLED THERAPEUTIC INTERVENTIONS
impairments found/anticipated discharge recommendation/predicted duration of therapy intervention/anticipated equipment needs at discharge

## 2019-03-28 NOTE — PROGRESS NOTE ADULT - PROBLEM SELECTOR PLAN 8
- ppx: on lovenox  - diet: NPO  - dispo: pendin clinical progress s/p radiation  - resume op follow up

## 2019-03-28 NOTE — PROGRESS NOTE ADULT - ASSESSMENT
84m hx CAD s/p CABG and PPM (not MR compatible), HTN, BPH, prior thyroid CA s/p thyroidectomy and resultant hypothyroidism, prior breast CA s/p L mastectomy, Prostate CA s/p radiation, melanoma s/p resection now presenting with abdominal pain found to have gallstone pancreatitis likely with passed stone, now s/p lap subtotal cholecystectomy

## 2019-03-28 NOTE — PHYSICAL THERAPY INITIAL EVALUATION ADULT - PERTINENT HX OF CURRENT PROBLEM, REHAB EVAL
84 y.o. 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

## 2019-03-28 NOTE — DISCHARGE NOTE PROVIDER - CARE PROVIDER_API CALL
Regis Mckeon (MD)  Surgery; Surgical Critical Care  [address] [id number]  Phone: [phone]  Fax: [phone]  Follow Up Time: 2 weeks

## 2019-03-28 NOTE — PHYSICAL THERAPY INITIAL EVALUATION ADULT - ADDITIONAL COMMENTS
pt lives alone in private house, stairs to basement, pt states will be staying on first floor. pt independent with mobility, amb with straight cane, son available to assist upon dc home

## 2019-03-28 NOTE — PROGRESS NOTE ADULT - SUBJECTIVE AND OBJECTIVE BOX
GENERAL SURGERY DAILY PROGRESS NOTE:       Subjective / Overnight events:  No acute overnight events.  Feels ok.  Pain controlled with medications.      Objective:      MEDICATIONS  (STANDING):  acetaminophen   Tablet .. 975 milliGRAM(s) Oral every 6 hours  aspirin enteric coated 81 milliGRAM(s) Oral daily  docusate sodium 100 milliGRAM(s) Oral three times a day  enoxaparin Injectable 40 milliGRAM(s) SubCutaneous daily  levothyroxine 150 MICROGram(s) Oral daily  losartan 50 milliGRAM(s) Oral daily  multivitamin 1 Tablet(s) Oral daily  simvastatin 10 milliGRAM(s) Oral at bedtime  tamsulosin 0.4 milliGRAM(s) Oral at bedtime    MEDICATIONS  (PRN):  oxyCODONE    IR 5 milliGRAM(s) Oral every 4 hours PRN Moderate Pain (4 - 6)  oxyCODONE    IR 10 milliGRAM(s) Oral every 4 hours PRN Severe Pain (7 - 10)  senna 2 Tablet(s) Oral at bedtime PRN Constipation      Vital Signs Last 24 Hrs  T(C): 36.8 (28 Mar 2019 05:09), Max: 36.8 (27 Mar 2019 13:12)  T(F): 98.2 (28 Mar 2019 05:09), Max: 98.3 (27 Mar 2019 13:12)  HR: 72 (28 Mar 2019 05:09) (60 - 73)  BP: 125/79 (28 Mar 2019 05:09) (95/62 - 172/82)  BP(mean): --  RR: 18 (28 Mar 2019 05:09) (16 - 19)  SpO2: 94% (28 Mar 2019 05:09) (94% - 100%)    I&O's Detail    27 Mar 2019 07:01  -  28 Mar 2019 07:00  --------------------------------------------------------  IN:  Total IN: 0 mL    OUT:    Bulb: 65 mL    Voided: 1000 mL  Total OUT: 1065 mL    Total NET: -1065 mL          Daily Height in cm: 177.8 (27 Mar 2019 17:12)    Daily     LABS:                        11.2   4.39  )-----------( 225      ( 27 Mar 2019 09:54 )             33.5     03-27    140  |  107  |  8   ----------------------------<  79  3.8   |  21<L>  |  0.91    Ca    9.2      27 Mar 2019 07:16  Phos  3.5     03-27  Mg     1.8     03-27    TPro  6.1  /  Alb  2.9<L>  /  TBili  0.3  /  DBili  0.1  /  AST  35  /  ALT  101<H>  /  AlkPhos  175<H>  03-27            PHYSICAL EXAM  --------------------------------------------------------------------------------    Physical Exam:  	Gen: NAD, A+O x 3  	Abd soft, appropriately tender, ND              MARCELLUS sanguinous

## 2019-03-28 NOTE — CHART NOTE - NSCHARTNOTEFT_GEN_A_CORE
Post-operative Check    SUBJECTIVE: No acute events in the immediate post-operative period. Pain well controlled.     OBJECTIVE:  T(C): 36.7 (03-28-19 @ 01:30), Max: 36.8 (03-27-19 @ 13:12)  HR: 68 (03-28-19 @ 01:30) (60 - 73)  BP: 106/67 (03-28-19 @ 01:30) (95/62 - 177/96)  RR: 18 (03-28-19 @ 01:30) (16 - 19)  SpO2: 94% (03-28-19 @ 01:30) (94% - 100%)      03-26-19 @ 07:01  -  03-27-19 @ 07:00  --------------------------------------------------------  IN: 2010 mL / OUT: 1675 mL / NET: 335 mL    03-27-19 @ 07:01  -  03-28-19 @ 04:25  --------------------------------------------------------  IN: 0 mL / OUT: 915 mL / NET: -915 mL        Physical Exam:   General: well developed, well nourished, NAD  Neuro: alert and oriented, no focal deficits, moves all extremities spontaneously  HEENT: NCAT, EOMI, anicteric, mucosa moist  Respiratory: airway patent, respirations unlabored  CVS: regular rate and rhythm  Abdomen: soft, nontender, nondistended, MARCELLUS in place with SS drainage, dressing mildly saturated, trochar sites clean with mild SS drainage  Extremities: no edema, sensation and movement grossly intact  Skin: warm, dry, appropriate color    ASSESSMENT:   SU HALEY is a 84y Male POD#0 from subtotal cholecystectomy      PLAN:  - Diet: regular  - dressing changes PRN around drain site  - monitor MARCELLUS drain output  - Pain management  - Follow UOP  - resume ASA and home meds  - DVT ppx  - stable for t/f to floor

## 2019-03-28 NOTE — PROGRESS NOTE ADULT - PROBLEM SELECTOR PLAN 3
s/p PPM for bradycardia (unclear etiology) No active issues, chest pain free  - continue aspirin, statin, ARB  - likely not on beta blocker due to bradycardia Mild- likely postop stress reaction vs. less likely infection  - observe off abx  - monitor WBC count  - monitor for fevers

## 2019-03-28 NOTE — DISCHARGE NOTE PROVIDER - HOSPITAL COURSE
3/22 - Carlitos Maradiaga is an 84 y.o. 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     Previous to last night, the patient states that his pain has only lasted for several hours, then resolved. However, last night, his pain started after dinner and continued until he was given pain medication in the ED. The patient complains of some associated nausea, but denies any fever, chills, vomiting, diaphoresis, constipation, or diarrhea.      CT Abdomen and Pelvis w/ IV Cont (03.22.19 @ 05:29) IMPRESSION: Suspect acute cholecystitis. Recommend clinical correlation and additional imaging for further evaluation.     Mild to moderate intrahepatic biliary dilatation. Common bile duct dilatation. If there is clinical suspicion for choledocholithiasis, MRCP/MRI may be obtained for further evaluation.    Nonspecific mild peripancreatic stranding. Recommend correlation with serum lipase to assess superimposed acute pancreatitis.  Decreased hepatic enhancement near the gallbladder fossa, which may be reactive although hepatic extension of gallbladder inflammation cannot be excluded. Borderline dilated, fluid-filled appendix without surrounding inflammatory change. Clinical question is advised to assess acute appendicitis. Prominent bladder wall, which may be due to partial distention. Recommend correlation with urinalysis to assess urinary tract infection.  Age-indeterminate compression fractures of the spine. Recommend comparison to a previous outside study to assess stability.    Lipase >539, Initial lab work and radiology consistent with gallstone pancreatitis. The patient was admitted to the Trauma surgery service, made NPO/IVF, GI was consulted for ERCP.  pacemaker record obtained, reviewed documentation, not compatible with MRI. Cardiology was consulted for risk stratification for EUS/ERCP.  EGD/EUS on 3/25/19 with findings of atrophic gastritis (biopsied), mildly dilated common bile duct without choledocholithiasis, likely due to pancreatitis.  Patient was placed on CLD, LFTs were trended, and OR planning for laparoscopic cholecystectomy.  The trauma hospitalist was consulted for medical comanagement.  On 3/27 patient underwent a laparoscopic subtotal cholecystectomy, MARCELLUS drain was left in place. The patient tolerated the procedure well without complications, was extubated, and transferred to the PACU in stable condition. In the PACU, the patients' pain was controlled, vitals stable, tolerating PO, and voiding appropriately.  The patient was transferred to the surgical floor in stable condition. MARCELLUS drain emptying and teaching was provided for the patient.  Physical therapy was consulted and recommended ****************    On day of discharge, the patient was tolerating diet, ambulating well and pain controlled.   The patient will be discharged home with outpatient follow up with Dr. Mckeon within 1-2 weeks of discharge.  The patient felt comfortable with discharge at this time. 3/22 - Carlitos Maradiaga is an 84 y.o. 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     Previous to last night, the patient states that his pain has only lasted for several hours, then resolved. However, last night, his pain started after dinner and continued until he was given pain medication in the ED. The patient complains of some associated nausea, but denies any fever, chills, vomiting, diaphoresis, constipation, or diarrhea.      CT Abdomen and Pelvis w/ IV Cont (03.22.19 @ 05:29) IMPRESSION: Suspect acute cholecystitis. Recommend clinical correlation and additional imaging for further evaluation.     Mild to moderate intrahepatic biliary dilatation. Common bile duct dilatation. If there is clinical suspicion for choledocholithiasis, MRCP/MRI may be obtained for further evaluation.    Nonspecific mild peripancreatic stranding. Recommend correlation with serum lipase to assess superimposed acute pancreatitis.  Decreased hepatic enhancement near the gallbladder fossa, which may be reactive although hepatic extension of gallbladder inflammation cannot be excluded. Borderline dilated, fluid-filled appendix without surrounding inflammatory change. Clinical question is advised to assess acute appendicitis. Prominent bladder wall, which may be due to partial distention. Recommend correlation with urinalysis to assess urinary tract infection.  Age-indeterminate compression fractures of the spine. Recommend comparison to a previous outside study to assess stability.    Lipase >539, Initial lab work and radiology consistent with gallstone pancreatitis. The patient was admitted to the Trauma surgery service, made NPO/IVF, GI was consulted for ERCP.  pacemaker record obtained, reviewed documentation, not compatible with MRI. Cardiology was consulted for risk stratification for EUS/ERCP.  EGD/EUS on 3/25/19 with findings of atrophic gastritis (biopsied), mildly dilated common bile duct without choledocholithiasis, likely due to pancreatitis.  Patient was placed on CLD, LFTs were trended, and OR planning for laparoscopic cholecystectomy.  The trauma hospitalist was consulted for medical comanagement.  On 3/27 patient underwent a laparoscopic subtotal cholecystectomy, MARCELLUS drain was left in place. The patient tolerated the procedure well without complications, was extubated, and transferred to the PACU in stable condition. In the PACU, the patients' pain was controlled, vitals stable, tolerating PO, and voiding appropriately.  The patient was transferred to the surgical floor in stable condition. MARCELLUS drain emptying and teaching was provided for the patient.  Physical therapy was consulted and recommended home with home PT.    On day of discharge, the patient was tolerating diet, ambulating well and pain controlled.   The patient will be discharged home with outpatient follow up with Dr. Mckeon within 1-2 weeks of discharge.  The patient felt comfortable with discharge at this time.

## 2019-03-28 NOTE — PROGRESS NOTE ADULT - SUBJECTIVE AND OBJECTIVE BOX
Hospitalist- Julien Graham MD  Pager: [phone]  If no response or off-hours, page [phone]  -------------------------------------  Patient is a 84y old  Male who presents with a chief complaint of Gallstone Pancreatitis (28 Mar 2019 07:35)  Subjective: Pt s/p lap subtotal cholecystectomy. Tolerated well. Today reports post surgical pain, suboptimally controlled but does not want to escalate pain regimen. Open to having a PRN order placed in case pain worsens. No fevers/chills, +tolerating soft diet, no BM yet.     14 point ros otherwise negative.     VITAL SIGNS:  Vital Signs Last 24 Hrs  T(C): 36.7 (28 Mar 2019 10:32), Max: 36.8 (27 Mar 2019 13:12)  T(F): 98.1 (28 Mar 2019 10:32), Max: 98.3 (27 Mar 2019 13:12)  HR: 64 (28 Mar 2019 10:32) (60 - 73)  BP: 115/69 (28 Mar 2019 10:32) (95/62 - 172/82)  BP(mean): --  RR: 18 (28 Mar 2019 10:32) (16 - 19)  SpO2: 95% (28 Mar 2019 10:32) (94% - 100%)    PHYSICAL EXAM:     GENERAL: no acute distress  HEENT: PERRLA, EOMI, moist oropharynx   RESPIRATORY: CTAB, no w/c   CARDIOVASCULAR: RRR, no murmurs, gallops, rubs  ABDOMINAL: soft, +lap surgical sites clean/intact, +moderate tom-incisional tenderness, no rigidity  EXTREMITIES: no clubbing, cyanosis, or edema  NEUROLOGICAL: alert and oriented x 3, non-focal  SKIN: no rashes or lesions   MUSCULOSKELETAL: no gross joint deformity                          10.0   11.10 )-----------( 216      ( 28 Mar 2019 09:42 )             29.9     03-28    138  |  102  |  13  ----------------------------<  102<H>  3.7   |  22  |  1.09    Ca    8.5      28 Mar 2019 07:24  Phos  4.6     03-28  Mg     1.7     03-28    TPro  5.7<L>  /  Alb  2.8<L>  /  TBili  0.3  /  DBili  0.1  /  AST  40  /  ALT  87<H>  /  AlkPhos  142<H>  03-28      CAPILLARY BLOOD GLUCOSE          MEDICATIONS  (STANDING):  acetaminophen   Tablet .. 975 milliGRAM(s) Oral every 6 hours  aspirin enteric coated 81 milliGRAM(s) Oral daily  docusate sodium 100 milliGRAM(s) Oral three times a day  enoxaparin Injectable 40 milliGRAM(s) SubCutaneous daily  levothyroxine 150 MICROGram(s) Oral daily  losartan 50 milliGRAM(s) Oral daily  magnesium sulfate  IVPB 2 Gram(s) IV Intermittent once  multivitamin 1 Tablet(s) Oral daily  potassium chloride    Tablet ER 20 milliEquivalent(s) Oral once  simvastatin 10 milliGRAM(s) Oral at bedtime  tamsulosin 0.4 milliGRAM(s) Oral at bedtime    MEDICATIONS  (PRN):  oxyCODONE    IR 5 milliGRAM(s) Oral every 8 hours PRN breakthrough pain  senna 2 Tablet(s) Oral at bedtime PRN Constipation  traMADol 25 milliGRAM(s) Oral three times a day PRN Severe Pain (7 - 10)

## 2019-03-28 NOTE — PROGRESS NOTE ADULT - PROBLEM SELECTOR PLAN 9
Home Medications: verified with patient's list  aspirin 81 mg oral delayed release tablet: 1 tab(s) orally once a day (03 Jul 2014 15:04)  Flomax 0.4 mg oral capsule: 1 cap(s) orally once a day (22 Mar 2019 08:42)  levothyroxine 150 mcg (0.15 mg) oral tablet: 1 tab(s) orally once a day (03 Jul 2014 15:04)  losartan 50 mg oral tablet: 1 tab(s) orally once a day (22 Mar 2019 08:42)  Multiple Vitamins oral tablet: 1 tab(s) orally once a day (03 Jul 2014 15:04)  simvastatin 10 mg oral tablet: 1 tab(s) orally once a day (at bedtime) (03 Jul 2014 15:04). - ppx: on lovenox  - diet: NPO  - dispo: pendin clinical progress

## 2019-03-28 NOTE — PROGRESS NOTE ADULT - ATTENDING COMMENTS
seen and examined 03-28-19 @ 0900    tolerating clears  no nausea or vomiting    soft / NT / ND  MARCELLUS - 65 mL serosanguinous drainage    urinary retention  -bladder scan  -may need Quintanilla    s/p laparoscopic subtotal cholecystectomy on 3/27 for chronic cholecystitis with gallstone pancreatitis  -plan D/C home with MARCELLUS seen and examined 03-28-19 @ 0900    tolerating regular diet  no nausea or vomiting    afeb  soft / NT / ND  MARCELLUS - 65 mL serosanguinous drainage    urinary retention  -bladder scan  -may need Quintanilla    s/p laparoscopic subtotal cholecystectomy on 3/27 for chronic cholecystitis with gallstone pancreatitis  -plan D/C home with MARCELLUS

## 2019-03-28 NOTE — PROGRESS NOTE ADULT - PROBLEM SELECTOR PLAN 5
s/p L mastectomy. No active issues at present  - resume OP follow up BP improved today  - continue at losartan 50mg po daily for now

## 2019-03-28 NOTE — PROVIDER CONTACT NOTE (OTHER) - ACTION/TREATMENT ORDERED:
Awaiting MD Jones to come to see pt, stated will be able to see pt within 1 hr.
no further intervention at this time, will continue to monitor pt.
Repeat /79 1 hr later. NP stated to give ordered BP med in morning.

## 2019-03-28 NOTE — DISCHARGE NOTE PROVIDER - NSDCACTIVITY_GEN_ALL_CORE
Stairs allowed/No heavy lifting/straining/Walking - Outdoors allowed/Driving allowed/Showering allowed/Walking - Indoors allowed

## 2019-03-28 NOTE — PROGRESS NOTE ADULT - PROBLEM SELECTOR PLAN 4
BP improved today  - continue at losartan 50mg po daily for now s/p PPM for bradycardia (unclear etiology) No active issues, chest pain free  - continue aspirin, statin, ARB  - likely not on beta blocker due to bradycardia

## 2019-03-28 NOTE — DISCHARGE NOTE PROVIDER - NSDCCPTREATMENT_GEN_ALL_CORE_FT
PRINCIPAL PROCEDURE  Procedure: Laparoscopic cholecystectomy  Findings and Treatment: Recover from surgery, follow up with Dr. Mckeon within 1-2 weeks of discharge      SECONDARY PROCEDURE  Procedure: Ultrasound, GI tract, endoscopic  Findings and Treatment:

## 2019-03-28 NOTE — PROGRESS NOTE ADULT - PROBLEM SELECTOR PLAN 6
2/2 thyroid cancer, s/p thyrectomy  - continue synthroid s/p L mastectomy. No active issues at present  - resume OP follow up

## 2019-03-28 NOTE — PROGRESS NOTE ADULT - ASSESSMENT
A/P  84M POD #1 s/p lap partial cholecystectomy  -MARCELLUS teaching  -reg diet  -PT/OOB  -IS  -AM labs  -d/c planning    ARIADNE Moon PA-C , pager # [phone]

## 2019-03-28 NOTE — DISCHARGE NOTE PROVIDER - NSDCCPCAREPLAN_GEN_ALL_CORE_FT
PRINCIPAL DISCHARGE DIAGNOSIS  Diagnosis: Gallstone pancreatitis  Assessment and Plan of Treatment: status post laparoscopic cholecystectomy, recover from surgery, follow up with Dr. Mckeon in 1-2 weeks      SECONDARY DISCHARGE DIAGNOSES  Diagnosis: Gastritis  Assessment and Plan of Treatment: Biopsy negative for H. Pylori    Diagnosis: LFT elevation  Assessment and Plan of Treatment: status post lap- john

## 2019-03-28 NOTE — DISCHARGE NOTE PROVIDER - NSDCFUADDINST_GEN_ALL_CORE_FT
You will be discharged with MARCELLUS drains. You will need to empty them and record outputs accurately. This will be taught to you by the nursing staff. Please do not remove the MARCELLUS drains. They will be removed in the office. Please bring to the office accurate records of output.   You may shower. Pat Dry abdomen. Leave the white steri strips in place, they will fall off on their own in approximately 5-7 days.   BATHING: Please do not submerge wound underwater. You may shower and/or sponge bathe.  ACTIVITY: No heavy lifting anything more than 10-15lbs or straining. Otherwise, you may return to your usual level of physical activity. If you are taking narcotic pain medication (such as Percocet), do NOT drive a car, operate machinery or make important decisions.  DIET: Low fat diet  NOTIFY YOUR SURGEON IF: You have any bleeding that does not stop, any pus draining from your wound, any fever (over 100.4 F) or chills, persistent nausea/vomiting with inability to tolerate food or liquids, persistent diarrhea, or if your pain is not controlled on your discharge pain medications.  FOLLOW-UP:  1. Please call to make a follow-up appointment within one week of discharge   2. Please follow up with your primary care physician in one week regarding your hospitalization.

## 2019-03-29 ENCOUNTER — TRANSCRIPTION ENCOUNTER (OUTPATIENT)
Age: 84
End: 2019-03-29

## 2019-03-29 VITALS
HEART RATE: 64 BPM | OXYGEN SATURATION: 94 % | RESPIRATION RATE: 18 BRPM | SYSTOLIC BLOOD PRESSURE: 135 MMHG | TEMPERATURE: 98 F | DIASTOLIC BLOOD PRESSURE: 74 MMHG

## 2019-03-29 LAB
BUN SERPL-MCNC: 14 MG/DL — SIGNIFICANT CHANGE UP (ref 7–23)
CALCIUM SERPL-MCNC: 8.7 MG/DL — SIGNIFICANT CHANGE UP (ref 8.4–10.5)
CHLORIDE SERPL-SCNC: 105 MMOL/L — SIGNIFICANT CHANGE UP (ref 96–108)
CO2 SERPL-SCNC: 21 MMOL/L — LOW (ref 22–31)
CREAT SERPL-MCNC: 1.02 MG/DL — SIGNIFICANT CHANGE UP (ref 0.5–1.3)
GLUCOSE SERPL-MCNC: 93 MG/DL — SIGNIFICANT CHANGE UP (ref 70–99)
HCT VFR BLD CALC: 29 % — LOW (ref 39–50)
HGB BLD-MCNC: 9.6 G/DL — LOW (ref 13–17)
MAGNESIUM SERPL-MCNC: 1.8 MG/DL — SIGNIFICANT CHANGE UP (ref 1.6–2.6)
MCHC RBC-ENTMCNC: 32.8 PG — SIGNIFICANT CHANGE UP (ref 27–34)
MCHC RBC-ENTMCNC: 33.1 GM/DL — SIGNIFICANT CHANGE UP (ref 32–36)
MCV RBC AUTO: 99 FL — SIGNIFICANT CHANGE UP (ref 80–100)
PHOSPHATE SERPL-MCNC: 2.8 MG/DL — SIGNIFICANT CHANGE UP (ref 2.5–4.5)
PLATELET # BLD AUTO: 216 K/UL — SIGNIFICANT CHANGE UP (ref 150–400)
POTASSIUM SERPL-MCNC: 4 MMOL/L — SIGNIFICANT CHANGE UP (ref 3.5–5.3)
POTASSIUM SERPL-SCNC: 4 MMOL/L — SIGNIFICANT CHANGE UP (ref 3.5–5.3)
RBC # BLD: 2.93 M/UL — LOW (ref 4.2–5.8)
RBC # FLD: 12.9 % — SIGNIFICANT CHANGE UP (ref 10.3–14.5)
SODIUM SERPL-SCNC: 139 MMOL/L — SIGNIFICANT CHANGE UP (ref 135–145)
WBC # BLD: 10.13 K/UL — SIGNIFICANT CHANGE UP (ref 3.8–10.5)
WBC # FLD AUTO: 10.13 K/UL — SIGNIFICANT CHANGE UP (ref 3.8–10.5)

## 2019-03-29 PROCEDURE — 96361 HYDRATE IV INFUSION ADD-ON: CPT

## 2019-03-29 PROCEDURE — 99285 EMERGENCY DEPT VISIT HI MDM: CPT | Mod: 25

## 2019-03-29 PROCEDURE — 99024 POSTOP FOLLOW-UP VISIT: CPT

## 2019-03-29 PROCEDURE — 83036 HEMOGLOBIN GLYCOSYLATED A1C: CPT

## 2019-03-29 PROCEDURE — 96374 THER/PROPH/DIAG INJ IV PUSH: CPT | Mod: XU

## 2019-03-29 PROCEDURE — 84100 ASSAY OF PHOSPHORUS: CPT

## 2019-03-29 PROCEDURE — 93005 ELECTROCARDIOGRAM TRACING: CPT

## 2019-03-29 PROCEDURE — 85610 PROTHROMBIN TIME: CPT

## 2019-03-29 PROCEDURE — 88312 SPECIAL STAINS GROUP 1: CPT

## 2019-03-29 PROCEDURE — 74177 CT ABD & PELVIS W/CONTRAST: CPT

## 2019-03-29 PROCEDURE — 97162 PT EVAL MOD COMPLEX 30 MIN: CPT

## 2019-03-29 PROCEDURE — 71045 X-RAY EXAM CHEST 1 VIEW: CPT

## 2019-03-29 PROCEDURE — 87086 URINE CULTURE/COLONY COUNT: CPT

## 2019-03-29 PROCEDURE — 96375 TX/PRO/DX INJ NEW DRUG ADDON: CPT

## 2019-03-29 PROCEDURE — 99233 SBSQ HOSP IP/OBS HIGH 50: CPT

## 2019-03-29 PROCEDURE — 86850 RBC ANTIBODY SCREEN: CPT

## 2019-03-29 PROCEDURE — 85027 COMPLETE CBC AUTOMATED: CPT

## 2019-03-29 PROCEDURE — 83605 ASSAY OF LACTIC ACID: CPT

## 2019-03-29 PROCEDURE — 80053 COMPREHEN METABOLIC PANEL: CPT

## 2019-03-29 PROCEDURE — 85730 THROMBOPLASTIN TIME PARTIAL: CPT

## 2019-03-29 PROCEDURE — C1889: CPT

## 2019-03-29 PROCEDURE — 84478 ASSAY OF TRIGLYCERIDES: CPT

## 2019-03-29 PROCEDURE — 86901 BLOOD TYPING SEROLOGIC RH(D): CPT

## 2019-03-29 PROCEDURE — 97110 THERAPEUTIC EXERCISES: CPT

## 2019-03-29 PROCEDURE — 97116 GAIT TRAINING THERAPY: CPT

## 2019-03-29 PROCEDURE — 82248 BILIRUBIN DIRECT: CPT

## 2019-03-29 PROCEDURE — 88304 TISSUE EXAM BY PATHOLOGIST: CPT

## 2019-03-29 PROCEDURE — 83690 ASSAY OF LIPASE: CPT

## 2019-03-29 PROCEDURE — 81001 URINALYSIS AUTO W/SCOPE: CPT

## 2019-03-29 PROCEDURE — 80076 HEPATIC FUNCTION PANEL: CPT

## 2019-03-29 PROCEDURE — 88305 TISSUE EXAM BY PATHOLOGIST: CPT

## 2019-03-29 PROCEDURE — 86900 BLOOD TYPING SEROLOGIC ABO: CPT

## 2019-03-29 PROCEDURE — 82962 GLUCOSE BLOOD TEST: CPT

## 2019-03-29 PROCEDURE — 83735 ASSAY OF MAGNESIUM: CPT

## 2019-03-29 PROCEDURE — 80048 BASIC METABOLIC PNL TOTAL CA: CPT

## 2019-03-29 RX ORDER — TRAMADOL HYDROCHLORIDE 50 MG/1
1 TABLET ORAL
Qty: 28 | Refills: 0
Start: 2019-03-29 | End: 2019-04-04

## 2019-03-29 RX ORDER — DOCUSATE SODIUM 100 MG
1 CAPSULE ORAL
Qty: 0 | Refills: 0 | DISCHARGE
Start: 2019-03-29

## 2019-03-29 RX ORDER — OXYCODONE HYDROCHLORIDE 5 MG/1
1 TABLET ORAL
Qty: 10 | Refills: 0
Start: 2019-03-29

## 2019-03-29 RX ORDER — SENNA PLUS 8.6 MG/1
2 TABLET ORAL
Qty: 0 | Refills: 0 | DISCHARGE
Start: 2019-03-29

## 2019-03-29 RX ORDER — MAGNESIUM SULFATE 500 MG/ML
2 VIAL (ML) INJECTION ONCE
Qty: 0 | Refills: 0 | Status: COMPLETED | OUTPATIENT
Start: 2019-03-29 | End: 2019-03-29

## 2019-03-29 RX ORDER — OXYCODONE HYDROCHLORIDE 5 MG/1
1 TABLET ORAL
Qty: 10 | Refills: 0 | OUTPATIENT
Start: 2019-03-29

## 2019-03-29 RX ORDER — ACETAMINOPHEN 500 MG
3 TABLET ORAL
Qty: 0 | Refills: 0 | DISCHARGE
Start: 2019-03-29

## 2019-03-29 RX ADMIN — Medication 1 TABLET(S): at 11:14

## 2019-03-29 RX ADMIN — Medication 975 MILLIGRAM(S): at 17:27

## 2019-03-29 RX ADMIN — Medication 100 MILLIGRAM(S): at 16:02

## 2019-03-29 RX ADMIN — Medication 150 MICROGRAM(S): at 05:46

## 2019-03-29 RX ADMIN — Medication 975 MILLIGRAM(S): at 05:47

## 2019-03-29 RX ADMIN — ENOXAPARIN SODIUM 40 MILLIGRAM(S): 100 INJECTION SUBCUTANEOUS at 11:38

## 2019-03-29 RX ADMIN — Medication 975 MILLIGRAM(S): at 11:17

## 2019-03-29 RX ADMIN — Medication 30 MILLILITER(S): at 07:19

## 2019-03-29 RX ADMIN — Medication 975 MILLIGRAM(S): at 11:27

## 2019-03-29 RX ADMIN — Medication 50 GRAM(S): at 11:24

## 2019-03-29 RX ADMIN — LOSARTAN POTASSIUM 50 MILLIGRAM(S): 100 TABLET, FILM COATED ORAL at 05:46

## 2019-03-29 RX ADMIN — Medication 975 MILLIGRAM(S): at 00:17

## 2019-03-29 RX ADMIN — Medication 975 MILLIGRAM(S): at 17:25

## 2019-03-29 RX ADMIN — Medication 975 MILLIGRAM(S): at 06:45

## 2019-03-29 RX ADMIN — Medication 81 MILLIGRAM(S): at 11:14

## 2019-03-29 RX ADMIN — Medication 100 MILLIGRAM(S): at 05:46

## 2019-03-29 NOTE — DISCHARGE NOTE NURSING/CASE MANAGEMENT/SOCIAL WORK - NSSCTYPOFSERV_GEN_ALL_CORE
SN eval,reinforcement of  ashlyn drain care,  PT eval, HHA eval.  Agency will contact you to set up visit.  Feel  free  to contact agency if any questions

## 2019-03-29 NOTE — PROGRESS NOTE ADULT - PROBLEM SELECTOR PLAN 1
Abd pain resolved, was tolerating CLD.   - currently NPO for lap john today  - remainder of care as per surgery
Pt s/p lap subtotal cholecystectomy  - advance diet as tolerated  - pain control suboptimal however pt does not want to escalate- will add Ultram 25mg po q8 hrs prn severe/breakthrough pain  - remainder of care as per surgery
Pt s/p lap subtotal cholecystectomy  - advance diet as tolerated  - pain control suboptimal however pt does not want to escalate- will add Ultram 25mg po q8 hrs prn severe/breakthrough pain  - remainder of care as per surgery

## 2019-03-29 NOTE — PROGRESS NOTE ADULT - REASON FOR ADMISSION
Gallstone Pancreatitis

## 2019-03-29 NOTE — PROGRESS NOTE ADULT - PROVIDER SPECIALTY LIST ADULT
Gastroenterology
Hospitalist
Surgery
Surgery
Trauma Surgery
Gastroenterology
Gastroenterology
Trauma Surgery
Trauma Surgery

## 2019-03-29 NOTE — PROGRESS NOTE ADULT - PROBLEM SELECTOR PLAN 10
Home Medications: verified with patient's list  aspirin 81 mg oral delayed release tablet: 1 tab(s) orally once a day (03 Jul 2014 15:04)  Flomax 0.4 mg oral capsule: 1 cap(s) orally once a day (22 Mar 2019 08:42)  levothyroxine 150 mcg (0.15 mg) oral tablet: 1 tab(s) orally once a day (03 Jul 2014 15:04)  losartan 50 mg oral tablet: 1 tab(s) orally once a day (22 Mar 2019 08:42)  Multiple Vitamins oral tablet: 1 tab(s) orally once a day (03 Jul 2014 15:04)  simvastatin 10 mg oral tablet: 1 tab(s) orally once a day (at bedtime) (03 Jul 2014 15:04).
Home Medications: verified with patient's list  aspirin 81 mg oral delayed release tablet: 1 tab(s) orally once a day (03 Jul 2014 15:04)  Flomax 0.4 mg oral capsule: 1 cap(s) orally once a day (22 Mar 2019 08:42)  levothyroxine 150 mcg (0.15 mg) oral tablet: 1 tab(s) orally once a day (03 Jul 2014 15:04)  losartan 50 mg oral tablet: 1 tab(s) orally once a day (22 Mar 2019 08:42)  Multiple Vitamins oral tablet: 1 tab(s) orally once a day (03 Jul 2014 15:04)  simvastatin 10 mg oral tablet: 1 tab(s) orally once a day (at bedtime) (03 Jul 2014 15:04).

## 2019-03-29 NOTE — PROGRESS NOTE ADULT - ATTENDING COMMENTS
seen and examined 03-29-19 @ 0955    tolerating regular diet  no nausea or vomiting    afeb  soft / NT / ND  MARCELLUS - 105 mL serosanguinous drainage    hgb - 11 -> 10 g/dL    urinary retention resolved    s/p laparoscopic subtotal cholecystectomy on 3/27 for chronic cholecystitis with gallstone pancreatitis  -PT evaluation  -plan D/C home with MARCELLUS if cleared by PT

## 2019-03-29 NOTE — PROGRESS NOTE ADULT - PROBLEM SELECTOR PLAN 2
Likely 2/2 adjacent inflammation from pancreatitis vs. ?cholecystitis seen on CT scan.  - downtrending, may continue monitoring
Likely 2/2 adjacent inflammation from pancreatitis vs. ?cholecystitis seen on CT scan.  - downtrending, may continue monitoring  - lap john per surgery as above
Likely 2/2 adjacent inflammation from pancreatitis vs. ?cholecystitis seen on CT scan.  - downtrending, may continue monitoring

## 2019-03-29 NOTE — PROGRESS NOTE ADULT - PROBLEM SELECTOR PLAN 3
Mild- likely postop stress reaction vs. less likely infection- improving  - observe off abx  - monitor WBC count  - monitor for fevers

## 2019-03-29 NOTE — PROGRESS NOTE ADULT - SUBJECTIVE AND OBJECTIVE BOX
Hospitalist- Julien Grhaam MD  Pager: [phone]  If no response or off-hours, page [phone]  -------------------------------------  Patient is a 84y old  Male who presents with a chief complaint of Gallstone Pancreatitis (29 Mar 2019 00:02)  Subjective: Pt symptomatically orthostatic yesterday, received fluid bolus and has been tolerating Po since. No more reported orthostasis. Pain controlled, +flatus, no BM. No fevers/chills.    14 point ros otherwise negative.     VITAL SIGNS:  Vital Signs Last 24 Hrs  T(C): 36.8 (29 Mar 2019 08:30), Max: 37.2 (29 Mar 2019 05:36)  T(F): 98.2 (29 Mar 2019 08:30), Max: 98.9 (29 Mar 2019 05:36)  HR: 66 (29 Mar 2019 10:51) (64 - 69)  BP: 157/94 (29 Mar 2019 10:51) (117/73 - 157/94)  BP(mean): --  RR: 17 (29 Mar 2019 08:30) (17 - 18)  SpO2: 95% (29 Mar 2019 10:51) (93% - 95%)      PHYSICAL EXAM:     GENERAL: no acute distress  HEENT: PERRLA, EOMI, moist oropharynx   RESPIRATORY: CTAB, no w/c   CARDIOVASCULAR: RRR, no murmurs, gallops, rubs  ABDOMINAL: soft, non-tender, non-distended, positive bowel sounds, +RUQ drain  EXTREMITIES: no clubbing, cyanosis, or edema  NEUROLOGICAL: alert and oriented x 3, non-focal  SKIN: no rashes or lesions   MUSCULOSKELETAL: no gross joint deformity                          9.6    10.13 )-----------( 216      ( 29 Mar 2019 09:21 )             29.0     03-29    139  |  105  |  14  ----------------------------<  93  4.0   |  21<L>  |  1.02    Ca    8.7      29 Mar 2019 06:54  Phos  2.8     03-29  Mg     1.8     03-29    TPro  5.7<L>  /  Alb  2.8<L>  /  TBili  0.3  /  DBili  0.1  /  AST  40  /  ALT  87<H>  /  AlkPhos  142<H>  03-28      CAPILLARY BLOOD GLUCOSE    MEDICATIONS  (STANDING):  acetaminophen   Tablet .. 975 milliGRAM(s) Oral every 6 hours  aspirin enteric coated 81 milliGRAM(s) Oral daily  docusate sodium 100 milliGRAM(s) Oral three times a day  enoxaparin Injectable 40 milliGRAM(s) SubCutaneous daily  levothyroxine 150 MICROGram(s) Oral daily  losartan 50 milliGRAM(s) Oral daily  multivitamin 1 Tablet(s) Oral daily  simvastatin 10 milliGRAM(s) Oral at bedtime  tamsulosin 0.4 milliGRAM(s) Oral at bedtime    MEDICATIONS  (PRN):  oxyCODONE    IR 5 milliGRAM(s) Oral every 8 hours PRN breakthrough pain  senna 2 Tablet(s) Oral at bedtime PRN Constipation  traMADol 25 milliGRAM(s) Oral three times a day PRN Severe Pain (7 - 10)

## 2019-03-29 NOTE — PROGRESS NOTE ADULT - SUBJECTIVE AND OBJECTIVE BOX
Surgery Progress Note    S: Patient seen and examined. No acute events overnight. Patient's Cr remains elevated. Nephrology was consulted who recommended our current treatment of holding NOAC and d/c'ing all nephrotoxic medications. Patient is eager to go home. Pain is well controlled. Tolerating diet without n/v. Patient was orthostatic with PT yesterday.     O:  Vital Signs Last 24 Hrs  T(C): 36.9 (28 Mar 2019 20:51), Max: 36.9 (28 Mar 2019 20:51)  T(F): 98.5 (28 Mar 2019 20:51), Max: 98.5 (28 Mar 2019 20:51)  HR: 64 (28 Mar 2019 20:51) (64 - 72)  BP: 125/72 (28 Mar 2019 20:51) (105/65 - 135/72)  BP(mean): --  RR: 18 (28 Mar 2019 20:51) (18 - 18)  SpO2: 93% (28 Mar 2019 20:51) (93% - 95%)    I&O's Detail    27 Mar 2019 07:01  -  28 Mar 2019 07:00  --------------------------------------------------------  IN:  Total IN: 0 mL    OUT:    Bulb: 65 mL    Voided: 1000 mL  Total OUT: 1065 mL    Total NET: -1065 mL      28 Mar 2019 07:01  -  29 Mar 2019 00:02  --------------------------------------------------------  IN:    Lactated Ringers IV Bolus: 1000 mL    Oral Fluid: 900 mL    Solution: 50 mL  Total IN: 1950 mL    OUT:    Bulb: 105 mL    Voided: 1075 mL  Total OUT: 1180 mL    Total NET: 770 mL          MEDICATIONS  (STANDING):  acetaminophen   Tablet .. 975 milliGRAM(s) Oral every 6 hours  aspirin enteric coated 81 milliGRAM(s) Oral daily  docusate sodium 100 milliGRAM(s) Oral three times a day  enoxaparin Injectable 40 milliGRAM(s) SubCutaneous daily  levothyroxine 150 MICROGram(s) Oral daily  losartan 50 milliGRAM(s) Oral daily  multivitamin 1 Tablet(s) Oral daily  simvastatin 10 milliGRAM(s) Oral at bedtime  tamsulosin 0.4 milliGRAM(s) Oral at bedtime    MEDICATIONS  (PRN):  oxyCODONE    IR 5 milliGRAM(s) Oral every 8 hours PRN breakthrough pain  senna 2 Tablet(s) Oral at bedtime PRN Constipation  traMADol 25 milliGRAM(s) Oral three times a day PRN Severe Pain (7 - 10)                            10.0   11.10 )-----------( 216      ( 28 Mar 2019 09:42 )             29.9       03-28    138  |  102  |  13  ----------------------------<  102<H>  3.7   |  22  |  1.09    Ca    8.5      28 Mar 2019 07:24  Phos  4.6     03-28  Mg     1.7     03-28    TPro  5.7<L>  /  Alb  2.8<L>  /  TBili  0.3  /  DBili  0.1  /  AST  40  /  ALT  87<H>  /  AlkPhos  142<H>  03-28      Physical Exam:  Gen: Laying in bed, NAD  Resp: Unlabored breathing  Abd: soft, appropriately tender around incisions, ND, RUQ MARCELLUS with SS output, no rebound or guarding  Ext: WWP  Skin: No rashes Surgery Progress Note    S: Patient seen and examined. No acute events overnight. Patient attempted to work with PT yesterday, however upon standing became pale and weak. Patient had orthostatic hypotension and received 1L bolus. Pain is well controlled. Tolerating diet without n/v.   O:  Vital Signs Last 24 Hrs  T(C): 36.9 (28 Mar 2019 20:51), Max: 36.9 (28 Mar 2019 20:51)  T(F): 98.5 (28 Mar 2019 20:51), Max: 98.5 (28 Mar 2019 20:51)  HR: 64 (28 Mar 2019 20:51) (64 - 72)  BP: 125/72 (28 Mar 2019 20:51) (105/65 - 135/72)  BP(mean): --  RR: 18 (28 Mar 2019 20:51) (18 - 18)  SpO2: 93% (28 Mar 2019 20:51) (93% - 95%)    I&O's Detail    27 Mar 2019 07:01  -  28 Mar 2019 07:00  --------------------------------------------------------  IN:  Total IN: 0 mL    OUT:    Bulb: 65 mL    Voided: 1000 mL  Total OUT: 1065 mL    Total NET: -1065 mL      28 Mar 2019 07:01  -  29 Mar 2019 00:02  --------------------------------------------------------  IN:    Lactated Ringers IV Bolus: 1000 mL    Oral Fluid: 900 mL    Solution: 50 mL  Total IN: 1950 mL    OUT:    Bulb: 105 mL    Voided: 1075 mL  Total OUT: 1180 mL    Total NET: 770 mL          MEDICATIONS  (STANDING):  acetaminophen   Tablet .. 975 milliGRAM(s) Oral every 6 hours  aspirin enteric coated 81 milliGRAM(s) Oral daily  docusate sodium 100 milliGRAM(s) Oral three times a day  enoxaparin Injectable 40 milliGRAM(s) SubCutaneous daily  levothyroxine 150 MICROGram(s) Oral daily  losartan 50 milliGRAM(s) Oral daily  multivitamin 1 Tablet(s) Oral daily  simvastatin 10 milliGRAM(s) Oral at bedtime  tamsulosin 0.4 milliGRAM(s) Oral at bedtime    MEDICATIONS  (PRN):  oxyCODONE    IR 5 milliGRAM(s) Oral every 8 hours PRN breakthrough pain  senna 2 Tablet(s) Oral at bedtime PRN Constipation  traMADol 25 milliGRAM(s) Oral three times a day PRN Severe Pain (7 - 10)                            10.0   11.10 )-----------( 216      ( 28 Mar 2019 09:42 )             29.9       03-28    138  |  102  |  13  ----------------------------<  102<H>  3.7   |  22  |  1.09    Ca    8.5      28 Mar 2019 07:24  Phos  4.6     03-28  Mg     1.7     03-28    TPro  5.7<L>  /  Alb  2.8<L>  /  TBili  0.3  /  DBili  0.1  /  AST  40  /  ALT  87<H>  /  AlkPhos  142<H>  03-28      Physical Exam:  Gen: Laying in bed, NAD  Resp: Unlabored breathing  Abd: soft, appropriately tender around incisions, ND, RUQ MARCELLUS with SS output, no rebound or guarding  Ext: WWP  Skin: No rashes

## 2019-03-29 NOTE — DISCHARGE NOTE NURSING/CASE MANAGEMENT/SOCIAL WORK - NSDCDPATPORTLINK_GEN_ALL_CORE
You can access the [employer] Patient Portal, offered by [facility], by registering with the following website: [email or web address]

## 2019-03-29 NOTE — PROGRESS NOTE ADULT - ASSESSMENT
84y M with gallstone pancreatitis s/p lap partial cholecystectomy (3/27)    -pain control as needed  -f/u PT re-eval  -lovenox for DVT ppx  -c/w home meds  -monitor MARCELLUS output  -c/w regular diet  -dispo: pending PT eval    ACS Surgery  [phone]

## 2019-04-11 ENCOUNTER — APPOINTMENT (OUTPATIENT)
Dept: TRAUMA SURGERY | Facility: CLINIC | Age: 84
End: 2019-04-11
Payer: MEDICARE

## 2019-04-11 VITALS
DIASTOLIC BLOOD PRESSURE: 87 MMHG | SYSTOLIC BLOOD PRESSURE: 126 MMHG | HEART RATE: 85 BPM | HEIGHT: 68 IN | TEMPERATURE: 97.9 F | WEIGHT: 140 LBS | BODY MASS INDEX: 21.22 KG/M2

## 2019-04-11 VITALS — SYSTOLIC BLOOD PRESSURE: 137 MMHG | HEART RATE: 80 BPM | DIASTOLIC BLOOD PRESSURE: 91 MMHG

## 2019-04-11 DIAGNOSIS — K85.10 BILIARY ACUTE PANCREATITIS WITHOUT NECROSIS OR INFECTION: ICD-10-CM

## 2019-04-11 PROCEDURE — 99024 POSTOP FOLLOW-UP VISIT: CPT

## 2019-04-12 PROBLEM — K85.10 ACUTE GALLSTONE PANCREATITIS: Status: ACTIVE | Noted: 2019-04-12

## 2019-04-12 NOTE — PHYSICAL EXAM
[de-identified] : soft / NT / ND. trocar incisions healed without infection. [de-identified] : no jaundice

## 2019-04-12 NOTE — HISTORY OF PRESENT ILLNESS
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 no fevers or chills.\par no tea-colored urine or alivia-colored stool to suggest retained choledocholithiasis.\par <5 mL / day of serous output from MARCELLUS

## 2019-05-01 ENCOUNTER — APPOINTMENT (OUTPATIENT)
Dept: ELECTROPHYSIOLOGY | Facility: CLINIC | Age: 84
End: 2019-05-01

## 2019-05-07 ENCOUNTER — APPOINTMENT (OUTPATIENT)
Dept: ELECTROPHYSIOLOGY | Facility: CLINIC | Age: 84
End: 2019-05-07
Payer: MEDICARE

## 2019-05-07 PROCEDURE — 93296 REM INTERROG EVL PM/IDS: CPT

## 2019-05-07 PROCEDURE — 93294 REM INTERROG EVL PM/LDLS PM: CPT

## 2019-05-13 ENCOUNTER — APPOINTMENT (OUTPATIENT)
Dept: CARDIOLOGY | Facility: CLINIC | Age: 84
End: 2019-05-13
Payer: MEDICARE

## 2019-05-13 ENCOUNTER — NON-APPOINTMENT (OUTPATIENT)
Age: 84
End: 2019-05-13

## 2019-05-13 VITALS
WEIGHT: 150 LBS | SYSTOLIC BLOOD PRESSURE: 101 MMHG | DIASTOLIC BLOOD PRESSURE: 69 MMHG | RESPIRATION RATE: 17 BRPM | HEIGHT: 66 IN | HEART RATE: 85 BPM | BODY MASS INDEX: 24.11 KG/M2 | TEMPERATURE: 97.6 F | OXYGEN SATURATION: 98 %

## 2019-05-13 VITALS — DIASTOLIC BLOOD PRESSURE: 64 MMHG | SYSTOLIC BLOOD PRESSURE: 102 MMHG

## 2019-05-13 DIAGNOSIS — I95.9 HYPOTENSION, UNSPECIFIED: ICD-10-CM

## 2019-05-13 PROCEDURE — 99214 OFFICE O/P EST MOD 30 MIN: CPT

## 2019-05-13 PROCEDURE — 93000 ELECTROCARDIOGRAM COMPLETE: CPT | Mod: 59

## 2019-05-13 NOTE — PHYSICAL EXAM
[Normal Appearance] : normal appearance [General Appearance - Well Developed] : well developed [Well Groomed] : well groomed [General Appearance - Well Nourished] : well nourished [No Deformities] : no deformities [General Appearance - In No Acute Distress] : no acute distress [Normal Conjunctiva] : the conjunctiva exhibited no abnormalities [Eyelids - No Xanthelasma] : the eyelids demonstrated no xanthelasmas [Normal Oral Mucosa] : normal oral mucosa [No Oral Cyanosis] : no oral cyanosis [No Oral Pallor] : no oral pallor [Normal Jugular Venous A Waves Present] : normal jugular venous A waves present [Normal Jugular Venous V Waves Present] : normal jugular venous V waves present [No Jugular Venous Ortiz A Waves] : no jugular venous ortiz A waves [Respiration, Rhythm And Depth] : normal respiratory rhythm and effort [Auscultation Breath Sounds / Voice Sounds] : lungs were clear to auscultation bilaterally [Exaggerated Use Of Accessory Muscles For Inspiration] : no accessory muscle use [Chest Palpation] : palpation of the chest revealed no abnormalities [Lungs Percussion] : the lungs were normal to percussion [Heart Rate And Rhythm] : heart rate and rhythm were normal [Edema] : no peripheral edema present [Heart Sounds] : normal S1 and S2 [Arterial Pulses Normal] : the arterial pulses were normal [Veins - Varicosity Changes] : no varicosital changes were noted in the lower extremities [Systolic grade ___/6] : A grade [unfilled]/6 systolic murmur was heard. [Abdomen Soft] : soft [Bowel Sounds] : normal bowel sounds [Abdomen Tenderness] : non-tender [Abdomen Mass (___ Cm)] : no abdominal mass palpated [Abdomen Hernia] : no hernia was discovered [Gait - Sufficient For Exercise Testing] : the gait was sufficient for exercise testing [Abnormal Walk] : normal gait [Nail Clubbing] : no clubbing of the fingernails [Petechial Hemorrhages (___cm)] : no petechial hemorrhages [Cyanosis, Localized] : no localized cyanosis [Skin Color & Pigmentation] : normal skin color and pigmentation [] : no rash [No Skin Ulcers] : no skin ulcer [No Venous Stasis] : no venous stasis [Skin Lesions] : no skin lesions [No Xanthoma] : no  xanthoma was observed [Oriented To Time, Place, And Person] : oriented to person, place, and time [Mood] : the mood was normal [Affect] : the affect was normal [No Anxiety] : not feeling anxious [FreeTextEntry1] : at apex., LSB

## 2019-05-13 NOTE — DISCUSSION/SUMMARY
[Coronary Artery Disease] : coronary artery disease [Pacemaker Function Normal] : normal pacemaker function [Hyperlipidemia] : hyperlipidemia [Diet Modification] : diet modification [Improving] : improving [Responding to Treatment] : responding to treatment [Stable] : stable [Patient] : the patient [Minutes spent___] : for [unfilled] ~Uminutes [___ Month(s)] : [unfilled] month(s) [With Me] : with me [Known CAD] : known coronary artery disease [None] : none [Hypertension] : hypertension [de-identified] : reactive hypertension? [de-identified] : now hypotension even without medication for month [FreeTextEntry1] : dizziness is partially from the  low BP, medication changed. [de-identified] : high degree AV block, s/p  PPM. [de-identified] : he lost weight, now wncourage to have  salt liberation.

## 2019-05-13 NOTE — REASON FOR VISIT
[Follow-Up - Clinic] : a clinic follow-up of [Hypertension] : hypertension [Coronary Artery Disease] : coronary artery disease [Hyperlipidemia] : hyperlipidemia [Syncope] : syncope [Pacemaker Evaluation] : pacemaker ~T evaluation ~C was performed

## 2019-05-13 NOTE — HISTORY OF PRESENT ILLNESS
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 He then was  placed on PPM on 6-6-2014 with Metronic ADD pacing at [facility].\par Since the PPM, he is doing well without event of fall or syncope.\par In the end of March 2019, he had episode of  abdominal pain  with the end result of "gall bladder full of stone". He had clean ERCP and lap john done with residual gall bladder  attaching to the intestine in [facility]. He had "tough surgery", but now he is "ok with low BP even off the antihypertensives"\par He has no more dizziness. He is free of  symptoms of chest pain, shortness of breath, dizziness or palpitations. But, when his BP is low, he feels  lightheadedness.

## 2019-05-13 NOTE — REVIEW OF SYSTEMS
[Negative] : Heme/Lymph [Fever] : no fever [Headache] : no headache [Chills] : no chills [Feeling Fatigued] : not feeling fatigued [Shortness Of Breath] : no shortness of breath [Dyspnea on exertion] : not dyspnea during exertion [Lower Ext Edema] : no extremity edema [Chest  Pressure] : no chest pressure [Chest Pain] : no chest pain [Palpitations] : no palpitations [Leg Claudication] : no intermittent leg claudication [Abdominal Pain] : no abdominal pain [Cough] : no cough [Vomiting] : no vomiting [Heartburn] : no heartburn [Change In The Stool] : no change in stool [Dysphagia] : no dysphagia [Joint Pain] : no joint pain [Joint Swelling] : no joint swelling [Muscle Cramps] : no muscle cramps [Skin: A Rash] : no rash: [Limb Weakness (Paresis)] : no limb weakness [Itching] : no itching [Dizziness] : no dizziness [Skin Lesions] : no skin lesions [Convulsions] : no convulsions [Tremor] : no tremor was seen [Numbness (Hypesthesia)] : no numbness [Tingling (Paresthesia)] : no tingling [Memory Lapses Or Loss] : no memory lapses or loss [Excessive Thirst] : no polydipsia [Easy Bruising] : no tendency for easy bruising [Easy Bleeding] : no tendency for easy bleeding [FreeTextEntry1] : had episode of diarrhea, now normal with antibiotics.

## 2019-07-10 ENCOUNTER — RX RENEWAL (OUTPATIENT)
Age: 84
End: 2019-07-10

## 2019-08-07 ENCOUNTER — APPOINTMENT (OUTPATIENT)
Dept: ELECTROPHYSIOLOGY | Facility: HOSPITAL | Age: 84
End: 2019-08-07
Payer: MEDICARE

## 2019-08-07 PROCEDURE — 93294 REM INTERROG EVL PM/LDLS PM: CPT

## 2019-08-07 PROCEDURE — 93296 REM INTERROG EVL PM/IDS: CPT

## 2019-08-28 ENCOUNTER — INPATIENT (INPATIENT)
Facility: HOSPITAL | Age: 84
LOS: 4 days | Discharge: HOME | DRG: 446 | End: 2019-09-02
Attending: SURGERY | Admitting: SURGERY
Payer: MEDICARE

## 2019-08-28 VITALS
OXYGEN SATURATION: 97 % | SYSTOLIC BLOOD PRESSURE: 183 MMHG | HEART RATE: 85 BPM | TEMPERATURE: 99 F | RESPIRATION RATE: 18 BRPM | HEIGHT: 71 IN | DIASTOLIC BLOOD PRESSURE: 113 MMHG | WEIGHT: 139.99 LBS

## 2019-08-28 DIAGNOSIS — Z90.49 ACQUIRED ABSENCE OF OTHER SPECIFIED PARTS OF DIGESTIVE TRACT: Chronic | ICD-10-CM

## 2019-08-28 LAB
ALBUMIN SERPL ELPH-MCNC: 3.7 G/DL — SIGNIFICANT CHANGE UP (ref 3.3–5)
ALP SERPL-CCNC: 420 U/L — HIGH (ref 40–120)
ALT FLD-CCNC: 728 U/L — HIGH (ref 10–45)
ANION GAP SERPL CALC-SCNC: 12 MMOL/L — SIGNIFICANT CHANGE UP (ref 5–17)
APPEARANCE UR: CLEAR — SIGNIFICANT CHANGE UP
AST SERPL-CCNC: 749 U/L — HIGH (ref 10–40)
BACTERIA # UR AUTO: NEGATIVE — SIGNIFICANT CHANGE UP
BASOPHILS # BLD AUTO: 0 K/UL — SIGNIFICANT CHANGE UP (ref 0–0.2)
BASOPHILS NFR BLD AUTO: 0.3 % — SIGNIFICANT CHANGE UP (ref 0–2)
BILIRUB SERPL-MCNC: 2.7 MG/DL — HIGH (ref 0.2–1.2)
BILIRUB UR-MCNC: NEGATIVE — SIGNIFICANT CHANGE UP
BUN SERPL-MCNC: 19 MG/DL — SIGNIFICANT CHANGE UP (ref 7–23)
CALCIUM SERPL-MCNC: 9.7 MG/DL — SIGNIFICANT CHANGE UP (ref 8.4–10.5)
CHLORIDE SERPL-SCNC: 101 MMOL/L — SIGNIFICANT CHANGE UP (ref 96–108)
CO2 SERPL-SCNC: 24 MMOL/L — SIGNIFICANT CHANGE UP (ref 22–31)
COLOR SPEC: ABNORMAL
CREAT SERPL-MCNC: 0.9 MG/DL — SIGNIFICANT CHANGE UP (ref 0.5–1.3)
DIFF PNL FLD: ABNORMAL
EOSINOPHIL # BLD AUTO: 0 K/UL — SIGNIFICANT CHANGE UP (ref 0–0.5)
EOSINOPHIL NFR BLD AUTO: 0.5 % — SIGNIFICANT CHANGE UP (ref 0–6)
EPI CELLS # UR: 1 /HPF — SIGNIFICANT CHANGE UP
GLUCOSE SERPL-MCNC: 109 MG/DL — HIGH (ref 70–99)
GLUCOSE UR QL: NEGATIVE — SIGNIFICANT CHANGE UP
HCT VFR BLD CALC: 30.9 % — LOW (ref 39–50)
HGB BLD-MCNC: 10.3 G/DL — LOW (ref 13–17)
HYALINE CASTS # UR AUTO: 0 /LPF — SIGNIFICANT CHANGE UP (ref 0–2)
KETONES UR-MCNC: SIGNIFICANT CHANGE UP
LEUKOCYTE ESTERASE UR-ACNC: ABNORMAL
LIDOCAIN IGE QN: 30 U/L — SIGNIFICANT CHANGE UP (ref 7–60)
LYMPHOCYTES # BLD AUTO: 1 K/UL — SIGNIFICANT CHANGE UP (ref 1–3.3)
LYMPHOCYTES # BLD AUTO: 13.1 % — SIGNIFICANT CHANGE UP (ref 13–44)
MCHC RBC-ENTMCNC: 32.4 PG — SIGNIFICANT CHANGE UP (ref 27–34)
MCHC RBC-ENTMCNC: 33.4 GM/DL — SIGNIFICANT CHANGE UP (ref 32–36)
MCV RBC AUTO: 96.9 FL — SIGNIFICANT CHANGE UP (ref 80–100)
MONOCYTES # BLD AUTO: 0.6 K/UL — SIGNIFICANT CHANGE UP (ref 0–0.9)
MONOCYTES NFR BLD AUTO: 7 % — SIGNIFICANT CHANGE UP (ref 2–14)
NEUTROPHILS # BLD AUTO: 6.2 K/UL — SIGNIFICANT CHANGE UP (ref 1.8–7.4)
NEUTROPHILS NFR BLD AUTO: 79.1 % — HIGH (ref 43–77)
NITRITE UR-MCNC: NEGATIVE — SIGNIFICANT CHANGE UP
PH UR: 6.5 — SIGNIFICANT CHANGE UP (ref 5–8)
PLATELET # BLD AUTO: 154 K/UL — SIGNIFICANT CHANGE UP (ref 150–400)
POTASSIUM SERPL-MCNC: 4.1 MMOL/L — SIGNIFICANT CHANGE UP (ref 3.5–5.3)
POTASSIUM SERPL-SCNC: 4.1 MMOL/L — SIGNIFICANT CHANGE UP (ref 3.5–5.3)
PROT SERPL-MCNC: 6.7 G/DL — SIGNIFICANT CHANGE UP (ref 6–8.3)
PROT UR-MCNC: ABNORMAL
RBC # BLD: 3.19 M/UL — LOW (ref 4.2–5.8)
RBC # FLD: 13 % — SIGNIFICANT CHANGE UP (ref 10.3–14.5)
RBC CASTS # UR COMP ASSIST: 124 /HPF — HIGH (ref 0–4)
SODIUM SERPL-SCNC: 137 MMOL/L — SIGNIFICANT CHANGE UP (ref 135–145)
SP GR SPEC: 1.02 — SIGNIFICANT CHANGE UP (ref 1.01–1.02)
UROBILINOGEN FLD QL: NEGATIVE — SIGNIFICANT CHANGE UP
WBC # BLD: 7.9 K/UL — SIGNIFICANT CHANGE UP (ref 3.8–10.5)
WBC # FLD AUTO: 7.9 K/UL — SIGNIFICANT CHANGE UP (ref 3.8–10.5)
WBC UR QL: 4 /HPF — SIGNIFICANT CHANGE UP (ref 0–5)

## 2019-08-28 PROCEDURE — 71045 X-RAY EXAM CHEST 1 VIEW: CPT | Mod: 26

## 2019-08-28 PROCEDURE — 99284 EMERGENCY DEPT VISIT MOD MDM: CPT | Mod: GC

## 2019-08-28 PROCEDURE — 93010 ELECTROCARDIOGRAM REPORT: CPT | Mod: GC

## 2019-08-28 PROCEDURE — 74177 CT ABD & PELVIS W/CONTRAST: CPT | Mod: 26

## 2019-08-28 RX ORDER — ONDANSETRON 8 MG/1
4 TABLET, FILM COATED ORAL ONCE
Refills: 0 | Status: COMPLETED | OUTPATIENT
Start: 2019-08-28 | End: 2019-08-28

## 2019-08-28 RX ORDER — MORPHINE SULFATE 50 MG/1
4 CAPSULE, EXTENDED RELEASE ORAL ONCE
Refills: 0 | Status: DISCONTINUED | OUTPATIENT
Start: 2019-08-28 | End: 2019-08-28

## 2019-08-28 RX ORDER — SODIUM CHLORIDE 9 MG/ML
1000 INJECTION INTRAMUSCULAR; INTRAVENOUS; SUBCUTANEOUS ONCE
Refills: 0 | Status: COMPLETED | OUTPATIENT
Start: 2019-08-28 | End: 2019-08-28

## 2019-08-28 RX ORDER — FAMOTIDINE 10 MG/ML
20 INJECTION INTRAVENOUS ONCE
Refills: 0 | Status: COMPLETED | OUTPATIENT
Start: 2019-08-28 | End: 2019-08-28

## 2019-08-28 RX ADMIN — Medication 30 MILLILITER(S): at 22:24

## 2019-08-28 RX ADMIN — ONDANSETRON 4 MILLIGRAM(S): 8 TABLET, FILM COATED ORAL at 22:24

## 2019-08-28 RX ADMIN — FAMOTIDINE 20 MILLIGRAM(S): 10 INJECTION INTRAVENOUS at 22:24

## 2019-08-28 RX ADMIN — SODIUM CHLORIDE 1000 MILLILITER(S): 9 INJECTION INTRAMUSCULAR; INTRAVENOUS; SUBCUTANEOUS at 22:25

## 2019-08-28 RX ADMIN — MORPHINE SULFATE 4 MILLIGRAM(S): 50 CAPSULE, EXTENDED RELEASE ORAL at 22:24

## 2019-08-28 NOTE — ED ADULT NURSE NOTE - CHPI ED NUR SYMPTOMS NEG
no dysuria/no hematuria/no vomiting/no abdominal distension/no blood in stool/no burning urination/no diarrhea/no fever

## 2019-08-28 NOTE — ED ADULT NURSE NOTE - NSIMPLEMENTINTERV_GEN_ALL_ED
Implemented All Fall Risk Interventions:  [place] to call system. Call bell, personal items and telephone within reach. Instruct patient to call for assistance. Room bathroom lighting operational. Non-slip footwear when patient is off stretcher. Physically safe environment: no spills, clutter or unnecessary equipment. Stretcher in lowest position, wheels locked, appropriate side rails in place. Provide visual cue, wrist band, yellow gown, etc. Monitor gait and stability. Monitor for mental status changes and reorient to person, place, and time. Review medications for side effects contributing to fall risk. Reinforce activity limits and safety measures with patient and family.

## 2019-08-28 NOTE — ED PROVIDER NOTE - PSH
S/P CABG x 2    S/P cholecystectomy  March 2019, Dr Mckeon/[facility]  S/P left mastectomy    S/P thyroidectomy

## 2019-08-28 NOTE — ED ADULT NURSE NOTE - OBJECTIVE STATEMENT
84y Male PMH Breast CA with L. mastectomy, Htn, CAD s/p CAGB, Thyroid CA, Prostate CA, hypothyroidism, hld presents to the ED c/o abd pain. Pt reporting onset of epigastric pain three days ago after eating food and states it was intermittent until today when it became more constant and severe. Pt states the pain has been constant since eating breakfast this morning, associated with mild nausea. PT states that in March 2019 he has cholecystectomy and states this pain is similar to the pain he had at that time. Pt states he was recently constipated but took senna last night and had normal BM this morning. Pt a&oX4, well in appearance, airway intact, breathing spontaneously and unlabored, abd soft nondistended nontender to palpation at this time, skin intact, moving all extremities, cap refill <3 seconds, pt denies ha, dizziness, CP, SOB, v/d, blood in stool, chills/fever. MD at bedside for eval. safety maintained.

## 2019-08-28 NOTE — ED PROVIDER NOTE - CLINICAL SUMMARY MEDICAL DECISION MAKING FREE TEXT BOX
ABD Pain sp john several months ago. Concerns for small bowel obstruction, retained stone. panceratis, gastritis. Check ct.labs,ua, ivf pain control and surg consult  Indra Alcocer MD, Facep ABD Pain sp john several months ago. Concerns for small bowel obstruction, retained stone. pancreatitis, gastritis. Check ct.labs,ua, ivf pain control and surg consult  Indra Alcocer MD, Facep

## 2019-08-28 NOTE — ED PROVIDER NOTE - OBJECTIVE STATEMENT
83yo M pmhx CAD s/p CABG, PPM, HTN, gallstone pancreatitis s/p subtotal la cholecystectomy came to ED for abdominal pain. abdominal pain began 3 days ago intermittent sharp pain. Pain is worse with food. reports nausea due to pain. Patient reports constipation, took 2 senna tabs last night, had BM this AM. 85yo M pmhx CAD s/p CABG, PPM, HTN, gallstone pancreatitis s/p subtotal la cholecystectomy came to ED for abdominal pain. abdominal pain began 3 days ago intermittent sharp pain. Pain is worse with food. Reports nausea due to pain. Recently constipated, took 2 senna tabs last night, had BM this AM. 83yo M pmhx CAD s/p CABG, PPM, HTN, gallstone pancreatitis s/p subtotal la cholecystectomy came to ED for abdominal pain. abdominal pain began 3 days ago intermittent sharp pain. Pain is worse with food. Endorses chills, nausea due to pain. weight loss of 12lbs since few months. Recently constipated, took 2 senna tabs last night, had BM this AM. Denies, fever, night sweats, blood in stool, 85yo M pmhx CAD s/p CABG, PPM, HTN, gallstone pancreatitis s/p subtotal la cholecystectomy came to ED for abdominal pain. abdominal pain began 3 days ago intermittent sharp pain. Pain is worse with food. Endorses chills, nausea due to pain. weight loss of 12lbs since few months. Recently constipated, took 2 senna tabs last night, had BM this AM. Denies CP,fever, night sweats, blood in stool, 85yo M pmhx CAD s/p CABG, PPM, HTN, gallstone pancreatitis s/p subtotal lap cholecystectomy came to ED for abdominal pain. abdominal pain began 3 days ago intermittent sharp pain. Reports that feels same as gallstone pancreatitis. Pain is worse with food. + chills, nausea due to pain. weight loss of 12lbs since few months. Recently constipated, took 2 senna tabs last night, had BM this AM. Denies CP, fever, night sweats, blood in stool,

## 2019-08-28 NOTE — ED PROVIDER NOTE - ATTENDING CONTRIBUTION TO CARE
Private Physician LEIGHTON Lora/pcp  84y male PMH Breast cancer, CAD SPY 3v cabg,PPM,, HLD,HTN, Hypothyroidism ,Prostate cancer sp rt,  Thyroid cancer SP rt/surg. SP LUKE 3/19 now comes to ed complains of abd pain. Onset three days ago. "Just like it did before I had the gall baldder surg" Nausea without fever, constipation responeded to laxative yesterday. No bleeding.FC,CP,SOB pain. pain 9/10. PE WDWN male awake alert normocephalic atraumatic neck supple chest clear anterior & posterior abd mild epigastric ttp no rebound guarding mass,cvat, neuro no focal defect  Indra Alcocer MD, Facep

## 2019-08-29 DIAGNOSIS — K80.50 CALCULUS OF BILE DUCT WITHOUT CHOLANGITIS OR CHOLECYSTITIS WITHOUT OBSTRUCTION: ICD-10-CM

## 2019-08-29 DIAGNOSIS — R10.9 UNSPECIFIED ABDOMINAL PAIN: ICD-10-CM

## 2019-08-29 LAB
ALBUMIN SERPL ELPH-MCNC: 3.1 G/DL — LOW (ref 3.3–5)
ALP SERPL-CCNC: 370 U/L — HIGH (ref 40–120)
ALT FLD-CCNC: 618 U/L — HIGH (ref 10–45)
ANION GAP SERPL CALC-SCNC: 10 MMOL/L — SIGNIFICANT CHANGE UP (ref 5–17)
AST SERPL-CCNC: 546 U/L — HIGH (ref 10–40)
BILIRUB SERPL-MCNC: 1.8 MG/DL — HIGH (ref 0.2–1.2)
BUN SERPL-MCNC: 15 MG/DL — SIGNIFICANT CHANGE UP (ref 7–23)
CALCIUM SERPL-MCNC: 9.1 MG/DL — SIGNIFICANT CHANGE UP (ref 8.4–10.5)
CANCER AG125 SERPL-ACNC: 14 U/ML — SIGNIFICANT CHANGE UP
CANCER AG19-9 SERPL-ACNC: 101 U/ML — HIGH
CEA SERPL-MCNC: 4 NG/ML — HIGH (ref 0–3.8)
CHLORIDE SERPL-SCNC: 103 MMOL/L — SIGNIFICANT CHANGE UP (ref 96–108)
CO2 SERPL-SCNC: 24 MMOL/L — SIGNIFICANT CHANGE UP (ref 22–31)
CREAT SERPL-MCNC: 0.89 MG/DL — SIGNIFICANT CHANGE UP (ref 0.5–1.3)
GLUCOSE SERPL-MCNC: 81 MG/DL — SIGNIFICANT CHANGE UP (ref 70–99)
HCT VFR BLD CALC: 27.7 % — LOW (ref 39–50)
HGB BLD-MCNC: 9.3 G/DL — LOW (ref 13–17)
MAGNESIUM SERPL-MCNC: 1.8 MG/DL — SIGNIFICANT CHANGE UP (ref 1.6–2.6)
MCHC RBC-ENTMCNC: 33 PG — SIGNIFICANT CHANGE UP (ref 27–34)
MCHC RBC-ENTMCNC: 33.5 GM/DL — SIGNIFICANT CHANGE UP (ref 32–36)
MCV RBC AUTO: 98.3 FL — SIGNIFICANT CHANGE UP (ref 80–100)
PHOSPHATE SERPL-MCNC: 2.7 MG/DL — SIGNIFICANT CHANGE UP (ref 2.5–4.5)
PLATELET # BLD AUTO: 142 K/UL — LOW (ref 150–400)
POTASSIUM SERPL-MCNC: 3.7 MMOL/L — SIGNIFICANT CHANGE UP (ref 3.5–5.3)
POTASSIUM SERPL-SCNC: 3.7 MMOL/L — SIGNIFICANT CHANGE UP (ref 3.5–5.3)
PROT SERPL-MCNC: 6 G/DL — SIGNIFICANT CHANGE UP (ref 6–8.3)
RBC # BLD: 2.82 M/UL — LOW (ref 4.2–5.8)
RBC # FLD: 13.1 % — SIGNIFICANT CHANGE UP (ref 10.3–14.5)
SODIUM SERPL-SCNC: 137 MMOL/L — SIGNIFICANT CHANGE UP (ref 135–145)
WBC # BLD: 6.2 K/UL — SIGNIFICANT CHANGE UP (ref 3.8–10.5)
WBC # FLD AUTO: 6.2 K/UL — SIGNIFICANT CHANGE UP (ref 3.8–10.5)

## 2019-08-29 PROCEDURE — 99222 1ST HOSP IP/OBS MODERATE 55: CPT

## 2019-08-29 PROCEDURE — 99222 1ST HOSP IP/OBS MODERATE 55: CPT | Mod: GC

## 2019-08-29 PROCEDURE — 99223 1ST HOSP IP/OBS HIGH 75: CPT

## 2019-08-29 RX ORDER — SODIUM CHLORIDE 9 MG/ML
1000 INJECTION, SOLUTION INTRAVENOUS
Refills: 0 | Status: DISCONTINUED | OUTPATIENT
Start: 2019-08-29 | End: 2019-08-29

## 2019-08-29 RX ORDER — TAMSULOSIN HYDROCHLORIDE 0.4 MG/1
0.4 CAPSULE ORAL AT BEDTIME
Refills: 0 | Status: DISCONTINUED | OUTPATIENT
Start: 2019-08-29 | End: 2019-09-02

## 2019-08-29 RX ORDER — DEXTROSE MONOHYDRATE, SODIUM CHLORIDE, AND POTASSIUM CHLORIDE 50; .745; 4.5 G/1000ML; G/1000ML; G/1000ML
1000 INJECTION, SOLUTION INTRAVENOUS
Refills: 0 | Status: DISCONTINUED | OUTPATIENT
Start: 2019-08-29 | End: 2019-08-31

## 2019-08-29 RX ORDER — AMLODIPINE BESYLATE 2.5 MG/1
5 TABLET ORAL ONCE
Refills: 0 | Status: COMPLETED | OUTPATIENT
Start: 2019-08-29 | End: 2019-08-29

## 2019-08-29 RX ORDER — ENOXAPARIN SODIUM 100 MG/ML
40 INJECTION SUBCUTANEOUS DAILY
Refills: 0 | Status: DISCONTINUED | OUTPATIENT
Start: 2019-08-29 | End: 2019-09-02

## 2019-08-29 RX ORDER — PIPERACILLIN AND TAZOBACTAM 4; .5 G/20ML; G/20ML
3.38 INJECTION, POWDER, LYOPHILIZED, FOR SOLUTION INTRAVENOUS ONCE
Refills: 0 | Status: COMPLETED | OUTPATIENT
Start: 2019-08-29 | End: 2019-08-29

## 2019-08-29 RX ORDER — PIPERACILLIN AND TAZOBACTAM 4; .5 G/20ML; G/20ML
3.38 INJECTION, POWDER, LYOPHILIZED, FOR SOLUTION INTRAVENOUS EVERY 8 HOURS
Refills: 0 | Status: DISCONTINUED | OUTPATIENT
Start: 2019-08-29 | End: 2019-09-01

## 2019-08-29 RX ORDER — AMLODIPINE BESYLATE 2.5 MG/1
5 TABLET ORAL DAILY
Refills: 0 | Status: DISCONTINUED | OUTPATIENT
Start: 2019-08-30 | End: 2019-09-02

## 2019-08-29 RX ORDER — PIPERACILLIN AND TAZOBACTAM 4; .5 G/20ML; G/20ML
INJECTION, POWDER, LYOPHILIZED, FOR SOLUTION INTRAVENOUS
Refills: 0 | Status: DISCONTINUED | OUTPATIENT
Start: 2019-08-29 | End: 2019-09-01

## 2019-08-29 RX ORDER — LEVOTHYROXINE SODIUM 125 MCG
150 TABLET ORAL DAILY
Refills: 0 | Status: DISCONTINUED | OUTPATIENT
Start: 2019-08-29 | End: 2019-09-02

## 2019-08-29 RX ORDER — LOSARTAN POTASSIUM 100 MG/1
50 TABLET, FILM COATED ORAL DAILY
Refills: 0 | Status: DISCONTINUED | OUTPATIENT
Start: 2019-08-29 | End: 2019-09-02

## 2019-08-29 RX ORDER — SIMVASTATIN 20 MG/1
10 TABLET, FILM COATED ORAL AT BEDTIME
Refills: 0 | Status: DISCONTINUED | OUTPATIENT
Start: 2019-08-29 | End: 2019-09-02

## 2019-08-29 RX ADMIN — Medication 150 MICROGRAM(S): at 06:03

## 2019-08-29 RX ADMIN — SIMVASTATIN 10 MILLIGRAM(S): 20 TABLET, FILM COATED ORAL at 21:42

## 2019-08-29 RX ADMIN — AMLODIPINE BESYLATE 5 MILLIGRAM(S): 2.5 TABLET ORAL at 18:50

## 2019-08-29 RX ADMIN — LOSARTAN POTASSIUM 50 MILLIGRAM(S): 100 TABLET, FILM COATED ORAL at 06:03

## 2019-08-29 RX ADMIN — PIPERACILLIN AND TAZOBACTAM 25 GRAM(S): 4; .5 INJECTION, POWDER, LYOPHILIZED, FOR SOLUTION INTRAVENOUS at 03:08

## 2019-08-29 RX ADMIN — MORPHINE SULFATE 4 MILLIGRAM(S): 50 CAPSULE, EXTENDED RELEASE ORAL at 02:45

## 2019-08-29 RX ADMIN — ENOXAPARIN SODIUM 40 MILLIGRAM(S): 100 INJECTION SUBCUTANEOUS at 11:38

## 2019-08-29 RX ADMIN — PIPERACILLIN AND TAZOBACTAM 25 GRAM(S): 4; .5 INJECTION, POWDER, LYOPHILIZED, FOR SOLUTION INTRAVENOUS at 13:39

## 2019-08-29 RX ADMIN — PIPERACILLIN AND TAZOBACTAM 200 GRAM(S): 4; .5 INJECTION, POWDER, LYOPHILIZED, FOR SOLUTION INTRAVENOUS at 02:45

## 2019-08-29 RX ADMIN — TAMSULOSIN HYDROCHLORIDE 0.4 MILLIGRAM(S): 0.4 CAPSULE ORAL at 21:42

## 2019-08-29 RX ADMIN — SODIUM CHLORIDE 100 MILLILITER(S): 9 INJECTION, SOLUTION INTRAVENOUS at 11:38

## 2019-08-29 RX ADMIN — PIPERACILLIN AND TAZOBACTAM 25 GRAM(S): 4; .5 INJECTION, POWDER, LYOPHILIZED, FOR SOLUTION INTRAVENOUS at 21:42

## 2019-08-29 NOTE — CONSULT NOTE ADULT - PROBLEM SELECTOR RECOMMENDATION 5
continue at losartan 50mg po daily for now. s/p L mastectomy. No active issues at present  - OP follow up.

## 2019-08-29 NOTE — H&P ADULT - NSHPLABSRESULTS_GEN_ALL_CORE
Vital Signs Last 24 Hrs  T(C): 36.6 (29 Aug 2019 03:09), Max: 37 (28 Aug 2019 19:10)  T(F): 97.9 (29 Aug 2019 03:09), Max: 98.6 (28 Aug 2019 19:10)  HR: 72 (29 Aug 2019 03:09) (63 - 85)  BP: 155/90 (29 Aug 2019 03:09) (155/90 - 183/113)  BP(mean): 116 (28 Aug 2019 22:28) (116 - 116)  RR: 18 (29 Aug 2019 03:09) (16 - 18)  SpO2: 95% (29 Aug 2019 03:09) (95% - 97%)      LABS:                        10.3   7.9   )-----------( 154      ( 28 Aug 2019 22:45 )             30.9         137  |  101  |  19  ----------------------------<  109<H>  4.1   |  24  |  0.90    Ca    9.7      28 Aug 2019 22:45    TPro  6.7  /  Alb  3.7  /  TBili  2.7<H>  /  DBili  x   /  AST  749<H>  /  ALT  728<H>  /  AlkPhos  420<H>        Urinalysis Basic - ( 28 Aug 2019 23:23 )    Color: Dark Yellow / Appearance: Clear / S.022 / pH: x  Gluc: x / Ketone: Trace  / Bili: Negative / Urobili: Negative   Blood: x / Protein: 30 mg/dL / Nitrite: Negative   Leuk Esterase: Small / RBC: 124 /hpf / WBC 4 /HPF   Sq Epi: x / Non Sq Epi: 1 /hpf / Bacteria: Negative        INs and OUTs:

## 2019-08-29 NOTE — H&P ADULT - NSHPPHYSICALEXAM_GEN_ALL_CORE
General: NAD, Sitting in bed comfortably, skin visibly jaundiced  HEENT: NC/AT, EOMI  Neck: Soft, supple  Cardio: RRR, nml S1/S2  Resp: Good effort, CTA b/l  Thorax: No chest wall tenderness  GI/Abd: Soft, mildly distended, nontender, no rebound/guarding, no masses palpated  Lymphatic/Nodes: No palpable lymphadenopathy  Musculoskeletal: All 4 extremities moving spontaneously, no limitations

## 2019-08-29 NOTE — CONSULT NOTE ADULT - PROBLEM SELECTOR RECOMMENDATION 2
Obstructive pattern on labs. With CBD dilation on CT scan  - GI consult for evaluation for EUS/ERCP Obstructive pattern on labs. With CBD dilation on CT scan. Improving  - GI consult for evaluation for EUS/ERCP

## 2019-08-29 NOTE — CONSULT NOTE ADULT - SUBJECTIVE AND OBJECTIVE BOX
TRAUMA COMANAGEMENT MEDICINE ATTENDING INITIAL CONSULT NOTE    HPI:  This is a 83 y/o M with a h/o CAD s/p MI (40+yrs ago), CABG, pacemaker, thyroid cancer (s/p thyroidectomy), breast cancer (s/p L mastectomy) and prostate cancer, and gallstone pancreatitis (s/p subtotal lap cholecystectomy, 2019, who p/w persistent epigastric abdominal pain since lap cholecystectomy. Pt states that he's always had 4~8/10 abdominal pain since the surgery that never really went away, usually begins one hour after eating, and improves w/ time, no radiation or associated symptoms. ,Pt states pain has become acutely worse over past three days, prompting the pt to present to [facility] ED. CTAP obtained in ED shows gallstones in the remnant gallbladder and cystic duct, choledocholiths w/ CBD dilatation up to 9mm       SUBJECTIVE: Seen at bedside. Pain better and currently controlled. No nausea or vomiting. No diarrhea. No fever/chills.     Home Medications:  acetaminophen 325 mg oral tablet: 3 tab(s) orally every 6 hours (29 Mar 2019 08:19)  aspirin 81 mg oral delayed release tablet: 1 tab(s) orally once a day (2014 15:04)  docusate sodium 100 mg oral capsule: 1 cap(s) orally 3 times a day (29 Mar 2019 08:19)  Flomax 0.4 mg oral capsule: 1 cap(s) orally once a day (22 Mar 2019 08:42)  levothyroxine 150 mcg (0.15 mg) oral tablet: 1 tab(s) orally once a day (2014 15:04)  losartan 50 mg oral tablet: 1 tab(s) orally once a day (22 Mar 2019 08:42)  Multiple Vitamins oral tablet: 1 tab(s) orally once a day (2014 15:04)  senna oral tablet: 2 tab(s) orally once a day (at bedtime), As needed, Constipation (29 Mar 2019 08:19)  simvastatin 10 mg oral tablet: 1 tab(s) orally once a day (at bedtime) (2014 15:04)      PAST MEDICAL & SURGICAL HISTORY:  Varicose vein  Hypothyroidism  Thyroid cancer  Breast cancer  Prostate cancer  HTN (hypertension)  HLD (hyperlipidemia)  CAD (coronary artery disease)  S/P cholecystectomy: 2019, Dr Mckeon/[facility]  S/P left mastectomy  S/P thyroidectomy  S/P CABG x 2      Review of Systems:   CONSTITUTIONAL: No fever, weight loss, or fatigue  EYES: No eye pain, visual disturbances, or discharge  ENMT:  No difficulty hearing, tinnitus, vertigo; No sinus or throat pain  NECK: No pain or stiffness  BREASTS: No pain, masses, or nipple discharge  RESPIRATORY: No cough, wheezing, chills or hemoptysis; No shortness of breath  CARDIOVASCULAR: No chest pain, palpitations, dizziness, or leg swelling  GASTROINTESTINAL: + abdominal/epigastric pain. No nausea, vomiting, or hematemesis; No diarrhea or constipation. No melena or hematochezia.  GENITOURINARY: No dysuria, frequency, hematuria, or incontinence  NEUROLOGICAL: No headaches, memory loss, loss of strength, numbness, or tremors  SKIN: No itching, burning, rashes, or lesions   LYMPH NODES: No enlarged glands  ENDOCRINE: No heat or cold intolerance; No hair loss  MUSCULOSKELETAL: No joint pain or swelling; No muscle, back, or extremity pain  PSYCHIATRIC: No depression, anxiety, mood swings, or difficulty sleeping  HEME/LYMPH: No easy bruising, or bleeding gums  ALLERY AND IMMUNOLOGIC: No hives or eczema    Allergies    lidocaine (Other)    Intolerances        Social History: Social History: former smoker, quit at age 25, occasional drinker, no drugs, lives alone at home, has son and grandchildren living nearby. Was functionally independent prior to admission    FAMILY HISTORY:   No history of gallstones    Vital Signs Last 24 Hrs  T(C): 36.7 (29 Aug 2019 09:19), Max: 37 (28 Aug 2019 19:10)  T(F): 98 (29 Aug 2019 09:19), Max: 98.6 (28 Aug 2019 19:10)  HR: 63 (29 Aug 2019 09:19) (63 - 85)  BP: 168/88 (29 Aug 2019 09:19) (155/90 - 183/113)  BP(mean): 116 (28 Aug 2019 22:28) (116 - 116)  RR: 18 (29 Aug 2019 09:19) (16 - 18)  SpO2: 95% (29 Aug 2019 09:19) (95% - 97%)  CAPILLARY BLOOD GLUCOSE          PHYSICAL EXAM:  GENERAL: NAD, well-developed  HEAD:  NCAT  EYES: EOMI  NECK: Supple, No JVD  CHEST/LUNG: Clear to auscultation bilaterally; No wheeze  HEART: Reg rate. No M/R/G.  ABDOMEN: Soft, NT/ND, Bowel sounds present  EXTREMITIES:  2+ Peripheral Pulses, No clubbing, cyanosis, or edema  PSYCH: AAOx3  NEUROLOGY: non-focal  SKIN: No rashes or lesions    LABS:                        9.3    6.2   )-----------( 142      ( 29 Aug 2019 07:25 )             27.7         137  |  103  |  15  ----------------------------<  81  3.7   |  24  |  0.89    Ca    9.1      29 Aug 2019 07:20  Phos  2.7       Mg     1.8         TPro  6.0  /  Alb  3.1<L>  /  TBili  1.8<H>  /  DBili  x   /  AST  546<H>  /  ALT  618<H>  /  AlkPhos  370<H>            Urinalysis Basic - ( 28 Aug 2019 23:23 )    Color: Dark Yellow / Appearance: Clear / S.022 / pH: x  Gluc: x / Ketone: Trace  / Bili: Negative / Urobili: Negative   Blood: x / Protein: 30 mg/dL / Nitrite: Negative   Leuk Esterase: Small / RBC: 124 /hpf / WBC 4 /HPF   Sq Epi: x / Non Sq Epi: 1 /hpf / Bacteria: Negative          MEDICATIONS  (STANDING):  enoxaparin Injectable 40 milliGRAM(s) SubCutaneous daily  lactated ringers. 1000 milliLiter(s) (100 mL/Hr) IV Continuous <Continuous>  levothyroxine 150 MICROGram(s) Oral daily  losartan 50 milliGRAM(s) Oral daily  piperacillin/tazobactam IVPB.. 3.375 Gram(s) IV Intermittent every 8 hours  piperacillin/tazobactam IVPB..      simvastatin 10 milliGRAM(s) Oral at bedtime  tamsulosin 0.4 milliGRAM(s) Oral at bedtime    MEDICATIONS  (PRN):    CT Abdomen: IMPRESSION:     Apparent cholecystectomy with residual calculi within the remnant and   cystic duct. Numerous nodular matted calcific densities along the hepatic   surface inferiorly and several calcified right hemiabdomen mesenteric   nodules which are new compared with prior exam and likely the sequela of   cholecystectomy and/or perforated cholecystitis.    Slight prominence of the intrahepatic biliary ducts and mildly dilated   common bile duct measuring up to 9 mm with choledocholith in the distal   common bile duct measuring up to 6 mm.
Chief Complaint:  Patient is a 84y old  Male who presents with a chief complaint of abdominal pain (29 Aug 2019 12:43)      HPI:  84M w/ PMH of CAD s/p MI (40+yrs ago), CABG, pacemaker, thyroid cancer (s/p thyroidectomy), breast cancer (s/p L mastectomy) and prostate cancer, and gallstone pancreatitis (s/p subtotal lap cholecystectomy, 2019, Dr. CHIQUI Mckeon), now p/w persistent epigastric abdominal pain since lap cholecystectomy. Pt states that he's always had 4~8/10 abdominal pain since the surgery that never really went away, usually begins postprandial, and improves w/ time, no radiation or associated symptoms. Pain has prompted pt from PO intake, and pt endorses 12lb weight loss since the surgery. 2 days ago, pt became acutely worse, prompting the pt to present to [facility] ED    Of note, during previous admission in March prior to lap cholecystectomy, pt also underwent EUS/ERSP w/ GI team, noted CBD narrowing at the level of pancreatic head w/o notable pancreatic head mass. Pt subsequently underwent a subtotal fenestrated lap cholecystectomy down to the infundibulum due to extensive intra-abdominal adhesions including duodenum plastered to the infundibulum. Lumen of the lower infundibulum was visualized, no additional stones found, and drain was placed under the R lobe of liver.     In the ED, vitals have been wnl and stable w/o fever or tachycardia, physical exam was significant for visibly jaundiced face, but negative for any tenderness in the abdomen, labs significant for Tbili 2.7, Alkphos 420, AST//728, and CTAP obtained in ED shows gallstones in the remnant gallbladder and cystic duct, choledocholiths w/ CBD dilatation upto 9mm (29 Aug 2019 02:01)    [facility] consulted for evaluation of choledocolithiasis.    Allergies:  lidocaine (Other)      Home Medications:  acetaminophen 325 mg oral tablet: 3 tab(s) orally every 6 hours (29 Mar 2019 08:19)  aspirin 81 mg oral delayed release tablet: 1 tab(s) orally once a day (2014 15:04)  docusate sodium 100 mg oral capsule: 1 cap(s) orally 3 times a day (29 Mar 2019 08:19)  Flomax 0.4 mg oral capsule: 1 cap(s) orally once a day (22 Mar 2019 08:42)  levothyroxine 150 mcg (0.15 mg) oral tablet: 1 tab(s) orally once a day (2014 15:04)  losartan 50 mg oral tablet: 1 tab(s) orally once a day (22 Mar 2019 08:42)  Multiple Vitamins oral tablet: 1 tab(s) orally once a day (2014 15:04)  senna oral tablet: 2 tab(s) orally once a day (at bedtime), As needed, Constipation (29 Mar 2019 08:19)  simvastatin 10 mg oral tablet: 1 tab(s) orally once a day (at bedtime) (2014 15:04)      Hospital Medications:  enoxaparin Injectable 40 milliGRAM(s) SubCutaneous daily  lactated ringers. 1000 milliLiter(s) IV Continuous <Continuous>  levothyroxine 150 MICROGram(s) Oral daily  losartan 50 milliGRAM(s) Oral daily  piperacillin/tazobactam IVPB.. 3.375 Gram(s) IV Intermittent every 8 hours  piperacillin/tazobactam IVPB..      simvastatin 10 milliGRAM(s) Oral at bedtime  tamsulosin 0.4 milliGRAM(s) Oral at bedtime      PMHX/PSHX:  Varicose vein  Hypothyroidism  Thyroid cancer  Breast cancer  Prostate cancer  HTN (hypertension)  HLD (hyperlipidemia)  CAD (coronary artery disease)  S/P cholecystectomy  S/P left mastectomy  S/P thyroidectomy  S/P CABG x 2      Family history:  noncontributory    Social History: denies etoh/drugs/tob    ROS:     General:  No wt loss, fevers, chills, night sweats, fatigue,   Eyes:  Good vision, no reported pain  ENT:  No sore throat, pain, runny nose, dysphagia  CV:  No pain, palpitations, hypo/hypertension  Resp:  No dyspnea, cough, tachypnea, wheezing  GI:  See HPI  :  No pain, bleeding, incontinence, nocturia  Muscle:  No pain, weakness  Neuro:  No weakness, tingling, memory problems  Psych:  No fatigue, insomnia, mood problems, depression  Endocrine:  No polyuria, polydipsia, cold/heat intolerance  Heme:  No petechiae, ecchymosis, easy bruisability  Skin:  No rash, edema      PHYSICAL EXAM:     GENERAL:  Appears stated age, well-groomed, well-nourished, no distress  HEENT:  NC/AT,  conjunctivae clear and pink,  no JVD  CHEST:  Full & symmetric excursion, no increased effort, breath sounds clear  HEART:  Regular rhythm, S1, S2, no murmur/rub/S3/S4, no abdominal bruit, no edema  ABDOMEN:  Soft, non-tender, non-distended, normoactive bowel sounds,  no masses ,  EXTREMITIES:  no cyanosis,clubbing or edema  SKIN:  No rash/erythema/ecchymoses/petechiae/wounds/abscess/warm/dry  NEURO:  Alert, oriented    Vital Signs:  Vital Signs Last 24 Hrs  T(C): 36.9 (29 Aug 2019 13:42), Max: 37 (28 Aug 2019 19:10)  T(F): 98.4 (29 Aug 2019 13:42), Max: 98.6 (28 Aug 2019 19:10)  HR: 61 (29 Aug 2019 13:42) (61 - 85)  BP: 168/78 (29 Aug 2019 13:42) (155/90 - 183/113)  BP(mean): 116 (28 Aug 2019 22:28) (116 - 116)  RR: 20 (29 Aug 2019 13:42) (16 - 20)  SpO2: 94% (29 Aug 2019 13:42) (94% - 97%)  Daily Height in cm: 180.34 (28 Aug 2019 19:10)    Daily     LABS:                        9.3    6.2   )-----------( 142      ( 29 Aug 2019 07:25 )             27.7     -29    137  |  103  |  15  ----------------------------<  81  3.7   |  24  |  0.89    Ca    9.1      29 Aug 2019 07:20  Phos  2.7       Mg     1.8         TPro  6.0  /  Alb  3.1<L>  /  TBili  1.8<H>  /  DBili  x   /  AST  546<H>  /  ALT  618<H>  /  AlkPhos  370<H>      LIVER FUNCTIONS - ( 29 Aug 2019 07:20 )  Alb: 3.1 g/dL / Pro: 6.0 g/dL / ALK PHOS: 370 U/L / ALT: 618 U/L / AST: 546 U/L / GGT: x             Urinalysis Basic - ( 28 Aug 2019 23:23 )    Color: Dark Yellow / Appearance: Clear / S.022 / pH: x  Gluc: x / Ketone: Trace  / Bili: Negative / Urobili: Negative   Blood: x / Protein: 30 mg/dL / Nitrite: Negative   Leuk Esterase: Small / RBC: 124 /hpf / WBC 4 /HPF   Sq Epi: x / Non Sq Epi: 1 /hpf / Bacteria: Negative      Amylase Serum--      Lipase serum30       Ammonia--      Imaging:  < from: CT Abdomen and Pelvis w/ Oral Cont and w/ IV Cont (19 @ 23:44) >    EXAM:  CT ABDOMEN AND PELVIS OC IC                            PROCEDURE DATE:  2019            INTERPRETATION:  CLINICAL INFORMATION: Abdominal pain, history of   cholecystectomy.    COMPARISON: 3/22/2019    PROCEDURE:   CT of the Abdomen andPelvis was performed with intravenous contrast.   Intravenous contrast: 90 ml Omnipaque 350. 10 ml discarded.  Oral contrast: positive contrast was administered.  Sagittal and coronal reformats were performed.    FINDINGS:    LOWER CHEST: Bibasilar dependent and platelike atelectasis. Trace   bilateral pleural effusions. Cardiomegaly. AICD/pacemaker leads in the   right atrium and right ventricle. Coronary artery atherosclerotic   calcifications. Median sternotomy wires.    LIVER: Numerous nodular matted calcific densities along the hepatic   surface inferiorly. Mild periportal edema.  BILE DUCTS: Slight prominence of the intrahepatic biliary ducts. Mildly   dilated common bile duct measuring up to 9 mm. Choledocholith in the   distal common bile duct measuring up to 6 mm.  GALLBLADDER: Apparent postcholecystectomy with residual calculi within   the remnant and cystic duct.  SPLEEN: Within normal limits.  PANCREAS: Within normal limits.  ADRENALS: Within normal limits.  KIDNEYS/URETERS: Kidneys enhance symmetrically without hydronephrosis.   Subcentimeter low-attenuation lesion in the left kidney is too small to   characterize.    BLADDER: Very mild wall thickening of the urinary bladder.  REPRODUCTIVE ORGANS: Prostate is borderline enlarged.    BOWEL: No bowel obstruction or overt bowel wall thickening. Mild fecal   distention of the cecum which is midline. Appendix is normal. Colonic   diverticulosis without evidence of diverticulitis.  PERITONEUM: No ascites, pneumoperitoneum, or loculated collection.   Calcified nodular densities within the right hemiabdomen mesentery.  VESSELS: Atherosclerotic calcifications of the aortoiliac tree and   abdominal aortic branches. Mild aneurysmal dilatation of the infrarenal   abdominal aorta which measures up to 2.9 cm in AP diameter, unchanged.  RETROPERITONEUM/LYMPH NODES: No lymphadenopathy.    ABDOMINAL WALL: Small fat-containing umbilical hernia.  BONES: Bones are demineralized. Chronic compression fractures of T12, L3,   and L4,most severely involving the T12 vertebral body. Degenerative   changes of the spine.    IMPRESSION:     Apparent cholecystectomy with residual calculi within the remnant and   cystic duct. Numerous nodular matted calcific densities along the hepatic   surface inferiorly and several calcified right hemiabdomen mesenteric   nodules which are new compared with prior exam and likely the sequela of   cholecystectomy and/or perforated cholecystitis.    Slight prominence of the intrahepatic biliary ducts and mildly dilated   common bile duct measuring up to 9 mm with choledocholith in the distal   common bile duct measuring up to 6 mm.    AUTUMN INFANTE D.O. ATTENDING RADIOLOGIST  This document has been electronically signed. Aug 29 2019 12:49AM                < end of copied text >

## 2019-08-29 NOTE — H&P ADULT - ASSESSMENT
84M w/ PMH of CAD s/p MI (40+yrs ago), CABG, pacemaker, thyroid cancer (s/p thyroidectomy), breast cancer (s/p L mastectomy) and prostate cancer, and gallstone pancreatitis (s/p subtotal lap cholecystectomy, March 2019, Dr. CHIQUI Mckeon), now p/w persistent epigastric abdominal pain since lap cholecystectomy, CTAP showing gallstones in the gallbladder remnant and distal CBD    - Admit to [facility] surgery team, Dr. Wade  - NPO/IVF  - IV Zosyn  - will contact GI for EUS/ERCP  - will send tumor markers  - will discuss w/ [facility] team in the AM      Quinn Cordero PGY-2  [facility]  [id number]

## 2019-08-29 NOTE — H&P ADULT - HISTORY OF PRESENT ILLNESS
84M w/ PMH of CAD s/p MI (40+yrs ago), CABG, pacemaker, thyroid cancer (s/p thyroidectomy), breast cancer (s/p L mastectomy) and prostate cancer, and gallstone pancreatitis (s/p subtotal lap cholecystectomy, March 2019, Dr. CHIQUI Mckeon), now p/w persistent epigastric abdominal pain since lap cholecystectomy. Pt states that he's always had 4~8/10 abdominal pain since the surgery that never really went away, usually begins postprandial, and improves w/ time, no radiation or associated symptoms. Pain has prompted pt from PO intake, and pt endorses 12lb weight loss since the surgery. 2 days ago, pt became acutely worse, prompting the pt to present to [facility] ED    Of note, during previous admission in March prior to lap cholecystectomy, pt also underwent EUS/ERSP w/ GI team, noted CBD narrowing at the level of pancreatic head w/o notable pancreatic head mass. Pt subsequently underwent a subtotal fenestrated lap cholecystectomy down to the infundibulum due to extensive intra-abdominal adhesions including duodenum plastered to the infundibulum. Lumen of the lower infundibulum was visualized, no additional stones found, and drain was placed under the R lobe of liver.     In the ED, vitals have been wnl and stable w/o fever or tachycardia, physical exam was significant for visibly jaundiced face, but negative for any tenderness in the abdomen, labs significant for Tbili 2.7, Alkphos 420, AST//728, and CTAP obtained in ED shows gallstones in the remnant gallbladder and cystic duct, choledocholiths w/ CBD dilatation upto 9mm

## 2019-08-29 NOTE — PROVIDER CONTACT NOTE (OTHER) - ASSESSMENT
Pt A&Ox4, VSS. Pt denies chest pain, pressure, or palpitations. Pt resting comfortably in bed at this time with no c/o distress.

## 2019-08-29 NOTE — CONSULT NOTE ADULT - PROBLEM SELECTOR RECOMMENDATION 9
Pt presented with post prandial abdominal pain since prior surgery. CT abdomen found gallstones in gallbladder remnant. Additionally was found to have slight prominence of the intrahepatic biliary ducts and mildly dilated CDB measuring up to 9 mm with choledocholith in the distal CBD measuring up to 6 mm. Pt presented with post prandial abdominal pain since prior surgery. CT abdomen found gallstones in gallbladder remnant. Additionally was found to have slight prominence of the intrahepatic biliary ducts and mildly dilated CDB measuring up to 9 mm with choledocholith in the distal CBD measuring up to 6 mm.  - GI consult for evaluation for EUS/ERCP.   - c/w Zosyn   - IVF while NPO Home Medications: verified with patient's list  aspirin 81 mg oral delayed release tablet: 1 tab(s) orally once a day   Flomax 0.4 mg oral capsule: 1 cap(s) orally once a day   levothyroxine 150 mcg (0.15 mg) oral tablet: 1 tab(s) orally once a day  losartan 50 mg oral tablet: 1 tab(s) orally once a day   Multiple Vitamins oral tablet: 1 tab(s) orally once a day   simvastatin 10 mg oral tablet: 1 tab(s) orally once a day

## 2019-08-29 NOTE — H&P ADULT - ATTENDING COMMENTS
Pt seen and examined in ED on 8/29, agree with above.    Pt s/p lap fenestrated subtotal cholecystectomy in March 2019 for gallstone pancreatitis after EUS demonstrated no choledocholithiasis and presence of narrowing of the distal CBD likely due to pancreatitis. Presents with ongoing epigastric/ RUQ pain after meals, which usually lasts 6-8 hours and resolves, but on day prior to admission, pain did not resolve. No fevers or chills.     Pt notes that he has lost 12 pounds in several months, and attributes this to combination of decreased po intake due to expectation of postprandial pain, as well as losing his wife and having other illnesses himself.     Labs showed elevated bilirubin and transaminitis. CT demonstrated dilation of CBD with choledocholith, as well as numerous gallstones in cholecystectomy bed.    Pt was admitted for GI evaluation for choledocholithiasis, serial labs, NPO, IVF, pain control. May need ERCP. Given some inflammatory changes in cholecystectomy bed, antibiotics started.

## 2019-08-29 NOTE — CONSULT NOTE ADULT - PROBLEM SELECTOR RECOMMENDATION 7
2/2 thyroid cancer, s/p thyrectomy  - continue synthroid. s/p radiation  - c/w Flomax  - outpatient follow up.

## 2019-08-29 NOTE — CONSULT NOTE ADULT - ASSESSMENT
85 y/o M with a h/o CAD s/p MI (40+yrs ago), CABG, pacemaker, thyroid cancer (s/p thyroidectomy), breast cancer (s/p L mastectomy) and prostate cancer, and gallstone pancreatitis (s/p subtotal lap cholecystectomy, March 2019, who p/w persistent epigastric abdominal pain found to have gallstones in the gallbladder remnant and CBD
Impression:  # choledocolithiasis  # stones in remnant GB  # CABG  # H/o thyroid ca, h/o breast cancer    Recs:  - can plan for ERCP with stone removal tomorrow  - NPO after midnight  - antibiotics for ?cholecystitis per surgery  - trend CBC, CMP, INR

## 2019-08-29 NOTE — CONSULT NOTE ADULT - PROBLEM SELECTOR RECOMMENDATION 6
s/p L mastectomy. No active issues at present  - resume OP follow up. 2/2 thyroid cancer, s/p thyroidectomy  - continue synthroid.

## 2019-08-29 NOTE — CONSULT NOTE ADULT - PROBLEM SELECTOR RECOMMENDATION 3
s/p PPM for bradycardia (unclear etiology) No active issues, chest pain free  - continue aspirin, statin, ARB  - likely not on beta blocker due to bradycardia.

## 2019-08-29 NOTE — CONSULT NOTE ADULT - PROBLEM SELECTOR RECOMMENDATION 4
s/p PPM for bradycardia (unclear etiology) No active issues, chest pain free  - continue aspirin, statin, ARB  - likely not on beta blocker due to bradycardia. continue at losartan 50mg po daily for now.

## 2019-08-30 ENCOUNTER — RESULT REVIEW (OUTPATIENT)
Age: 84
End: 2019-08-30

## 2019-08-30 LAB
ALBUMIN SERPL ELPH-MCNC: 3.1 G/DL — LOW (ref 3.3–5)
ALP SERPL-CCNC: 320 U/L — HIGH (ref 40–120)
ALT FLD-CCNC: 400 U/L — HIGH (ref 10–45)
ANION GAP SERPL CALC-SCNC: 11 MMOL/L — SIGNIFICANT CHANGE UP (ref 5–17)
AST SERPL-CCNC: 204 U/L — HIGH (ref 10–40)
BILIRUB SERPL-MCNC: 0.8 MG/DL — SIGNIFICANT CHANGE UP (ref 0.2–1.2)
BUN SERPL-MCNC: 12 MG/DL — SIGNIFICANT CHANGE UP (ref 7–23)
CALCIUM SERPL-MCNC: 9.2 MG/DL — SIGNIFICANT CHANGE UP (ref 8.4–10.5)
CHLORIDE SERPL-SCNC: 101 MMOL/L — SIGNIFICANT CHANGE UP (ref 96–108)
CO2 SERPL-SCNC: 25 MMOL/L — SIGNIFICANT CHANGE UP (ref 22–31)
CREAT SERPL-MCNC: 0.95 MG/DL — SIGNIFICANT CHANGE UP (ref 0.5–1.3)
GLUCOSE SERPL-MCNC: 85 MG/DL — SIGNIFICANT CHANGE UP (ref 70–99)
HCT VFR BLD CALC: 29.4 % — LOW (ref 39–50)
HGB BLD-MCNC: 9.7 G/DL — LOW (ref 13–17)
INR BLD: 1.16 RATIO — SIGNIFICANT CHANGE UP (ref 0.88–1.16)
MAGNESIUM SERPL-MCNC: 1.8 MG/DL — SIGNIFICANT CHANGE UP (ref 1.6–2.6)
MCHC RBC-ENTMCNC: 32.3 PG — SIGNIFICANT CHANGE UP (ref 27–34)
MCHC RBC-ENTMCNC: 33 GM/DL — SIGNIFICANT CHANGE UP (ref 32–36)
MCV RBC AUTO: 98 FL — SIGNIFICANT CHANGE UP (ref 80–100)
PHOSPHATE SERPL-MCNC: 2.8 MG/DL — SIGNIFICANT CHANGE UP (ref 2.5–4.5)
PLATELET # BLD AUTO: 157 K/UL — SIGNIFICANT CHANGE UP (ref 150–400)
POTASSIUM SERPL-MCNC: 3.5 MMOL/L — SIGNIFICANT CHANGE UP (ref 3.5–5.3)
POTASSIUM SERPL-SCNC: 3.5 MMOL/L — SIGNIFICANT CHANGE UP (ref 3.5–5.3)
PROT SERPL-MCNC: 6.2 G/DL — SIGNIFICANT CHANGE UP (ref 6–8.3)
PROTHROM AB SERPL-ACNC: 13.4 SEC — HIGH (ref 10–13.1)
RBC # BLD: 3 M/UL — LOW (ref 4.2–5.8)
RBC # FLD: 14 % — SIGNIFICANT CHANGE UP (ref 10.3–14.5)
SODIUM SERPL-SCNC: 137 MMOL/L — SIGNIFICANT CHANGE UP (ref 135–145)
WBC # BLD: 6.05 K/UL — SIGNIFICANT CHANGE UP (ref 3.8–10.5)
WBC # FLD AUTO: 6.05 K/UL — SIGNIFICANT CHANGE UP (ref 3.8–10.5)

## 2019-08-30 PROCEDURE — 43262 ENDO CHOLANGIOPANCREATOGRAPH: CPT

## 2019-08-30 PROCEDURE — 43264 ERCP REMOVE DUCT CALCULI: CPT

## 2019-08-30 PROCEDURE — 88305 TISSUE EXAM BY PATHOLOGIST: CPT | Mod: 26

## 2019-08-30 PROCEDURE — 99233 SBSQ HOSP IP/OBS HIGH 50: CPT

## 2019-08-30 PROCEDURE — 99232 SBSQ HOSP IP/OBS MODERATE 35: CPT

## 2019-08-30 PROCEDURE — 43254 EGD ENDO MUCOSAL RESECTION: CPT

## 2019-08-30 RX ORDER — POTASSIUM PHOSPHATE, MONOBASIC POTASSIUM PHOSPHATE, DIBASIC 236; 224 MG/ML; MG/ML
15 INJECTION, SOLUTION INTRAVENOUS ONCE
Refills: 0 | Status: COMPLETED | OUTPATIENT
Start: 2019-08-30 | End: 2019-08-30

## 2019-08-30 RX ORDER — MAGNESIUM SULFATE 500 MG/ML
2 VIAL (ML) INJECTION ONCE
Refills: 0 | Status: COMPLETED | OUTPATIENT
Start: 2019-08-30 | End: 2019-08-30

## 2019-08-30 RX ADMIN — LOSARTAN POTASSIUM 50 MILLIGRAM(S): 100 TABLET, FILM COATED ORAL at 05:51

## 2019-08-30 RX ADMIN — TAMSULOSIN HYDROCHLORIDE 0.4 MILLIGRAM(S): 0.4 CAPSULE ORAL at 21:58

## 2019-08-30 RX ADMIN — ENOXAPARIN SODIUM 40 MILLIGRAM(S): 100 INJECTION SUBCUTANEOUS at 14:31

## 2019-08-30 RX ADMIN — POTASSIUM PHOSPHATE, MONOBASIC POTASSIUM PHOSPHATE, DIBASIC 62.5 MILLIMOLE(S): 236; 224 INJECTION, SOLUTION INTRAVENOUS at 14:30

## 2019-08-30 RX ADMIN — PIPERACILLIN AND TAZOBACTAM 25 GRAM(S): 4; .5 INJECTION, POWDER, LYOPHILIZED, FOR SOLUTION INTRAVENOUS at 05:51

## 2019-08-30 RX ADMIN — Medication 150 MICROGRAM(S): at 05:51

## 2019-08-30 RX ADMIN — Medication 50 GRAM(S): at 08:54

## 2019-08-30 RX ADMIN — AMLODIPINE BESYLATE 5 MILLIGRAM(S): 2.5 TABLET ORAL at 05:51

## 2019-08-30 RX ADMIN — PIPERACILLIN AND TAZOBACTAM 25 GRAM(S): 4; .5 INJECTION, POWDER, LYOPHILIZED, FOR SOLUTION INTRAVENOUS at 21:58

## 2019-08-30 RX ADMIN — DEXTROSE MONOHYDRATE, SODIUM CHLORIDE, AND POTASSIUM CHLORIDE 75 MILLILITER(S): 50; .745; 4.5 INJECTION, SOLUTION INTRAVENOUS at 14:45

## 2019-08-30 RX ADMIN — DEXTROSE MONOHYDRATE, SODIUM CHLORIDE, AND POTASSIUM CHLORIDE 75 MILLILITER(S): 50; .745; 4.5 INJECTION, SOLUTION INTRAVENOUS at 08:47

## 2019-08-30 RX ADMIN — PIPERACILLIN AND TAZOBACTAM 25 GRAM(S): 4; .5 INJECTION, POWDER, LYOPHILIZED, FOR SOLUTION INTRAVENOUS at 14:44

## 2019-08-30 RX ADMIN — SIMVASTATIN 10 MILLIGRAM(S): 20 TABLET, FILM COATED ORAL at 21:58

## 2019-08-30 RX ADMIN — DEXTROSE MONOHYDRATE, SODIUM CHLORIDE, AND POTASSIUM CHLORIDE 75 MILLILITER(S): 50; .745; 4.5 INJECTION, SOLUTION INTRAVENOUS at 00:52

## 2019-08-30 NOTE — PROGRESS NOTE ADULT - ASSESSMENT
83 y/o M with a h/o CAD s/p MI (40+yrs ago), CABG, pacemaker, thyroid cancer (s/p thyroidectomy), breast cancer (s/p L mastectomy) and prostate cancer, and gallstone pancreatitis (s/p subtotal lap cholecystectomy, March 2019, who p/w persistent epigastric abdominal pain found to have gallstones in the gallbladder remnant and CBD

## 2019-08-30 NOTE — PHYSICAL THERAPY INITIAL EVALUATION ADULT - ADDITIONAL COMMENTS
CTAP obtained in ED shows gallstones in the remnant gallbladder and cystic duct, choledocholiths w/ CBD dilatation upto 9mm. Additionally was found to have slight prominence of the intrahepatic biliary ducts and mildly dilated CDB measuring up to 9 mm with choledocholith in the distal CBD measuring up to 6 mm. Plan for ERCP with stone removal 8/30. CTAP obtained in ED shows gallstones in the remnant gallbladder and cystic duct, choledocholiths w/ CBD dilatation upto 9mm. Additionally was found to have slight prominence of the intrahepatic biliary ducts and mildly dilated CDB measuring up to 9 mm with choledocholith in the distal CBD measuring up to 6 mm. Plan for ERCP with stone removal 8/30.  PTA pt was independent with functional mobility and ADl's with a straight cane. Pt lives alone in a  with 4 steps to enter 1 flight in side +UHR.

## 2019-08-30 NOTE — PROGRESS NOTE ADULT - ASSESSMENT
84M w/ PMH of CAD s/p MI (40+yrs ago), CABG, pacemaker, thyroid cancer (s/p thyroidectomy), breast cancer (s/p L mastectomy) and prostate cancer, and gallstone pancreatitis (s/p subtotal lap cholecystectomy, March 2019, Dr. CHIQUI Mckeon), now p/w persistent epigastric abdominal pain since lap cholecystectomy, CTAP showing gallstones in the gallbladder remnant and distal CBD. Now with elevated CA 19-9.     - NPO/IVF for ERCP today   - Consult

## 2019-08-30 NOTE — PROGRESS NOTE ADULT - PROBLEM SELECTOR PLAN 9
Home Medications: verified with patient's list  aspirin 81 mg oral delayed release tablet: 1 tab(s) orally once a day   Flomax 0.4 mg oral capsule: 1 cap(s) orally once a day   levothyroxine 150 mcg (0.15 mg) oral tablet: 1 tab(s) orally once a day  losartan 50 mg oral tablet: 1 tab(s) orally once a day   Multiple Vitamins oral tablet: 1 tab(s) orally once a day   simvastatin 10 mg oral tablet: 1 tab(s) orally once a day.

## 2019-08-30 NOTE — PROGRESS NOTE ADULT - SUBJECTIVE AND OBJECTIVE BOX
Laron Fields MD  Division of Hospital Medicine  Pager [phone]  If no response or off hours page: [phone]  ---------------------------------------------------------    SU HALEY  84y  Male      Patient is a 84y old  Male who presents with a chief complaint of abdominal pain (30 Aug 2019 11:19)      INTERVAL HPI/OVERNIGHT EVENTS:  Denies any complaints this am. plan for ERCP today      REVIEW OF SYSTEMS: 10 point ROS negative unless listed above    T(C): 36.3 (19 @ 09:20), Max: 36.9 (19 @ 13:42)  HR: 73 (19 @ 09:20) (61 - 73)  BP: 113/76 (19 @ 09:20) (113/76 - 185/89)  RR: 18 (19 @ 09:20) (18 - 20)  SpO2: 96% (19 @ 09:20) (94% - 96%)  Wt(kg): --Vital Signs Last 24 Hrs  T(C): 36.3 (30 Aug 2019 09:20), Max: 36.9 (29 Aug 2019 13:42)  T(F): 97.3 (30 Aug 2019 09:20), Max: 98.4 (29 Aug 2019 13:42)  HR: 73 (30 Aug 2019 09:20) (61 - 73)  BP: 113/76 (30 Aug 2019 09:20) (113/76 - 185/89)  BP(mean): --  RR: 18 (30 Aug 2019 09:20) (18 - 20)  SpO2: 96% (30 Aug 2019 09:20) (94% - 96%)    PHYSICAL EXAM:  GENERAL: NAD, well-developed  HEAD:  NCAT  NECK: Supple, No JVD  CHEST/LUNG: Clear to auscultation bilaterally; No wheeze  HEART: Reg rate. No M/R/G.  ABDOMEN: Soft, NT/ND, Bowel sounds present  EXTREMITIES:  2+ Peripheral Pulses, No clubbing, cyanosis, or edema  PSYCH: AAOx3  NEUROLOGY: non-focal  SKIN: No rashes or lesions    Consultant(s) Notes Reviewed:  [x ] YES  [ ] NO  Care Discussed with Consultants/Other Providers [ x] YES  [ ] NO    LABS:                        9.7    6.05  )-----------( 157      ( 30 Aug 2019 09:52 )             29.4     08-30    137  |  101  |  12  ----------------------------<  85  3.5   |  25  |  0.95    Ca    9.2      30 Aug 2019 07:16  Phos  2.8     08-30  Mg     1.8     -    TPro  6.2  /  Alb  3.1<L>  /  TBili  0.8  /  DBili  x   /  AST  204<H>  /  ALT  400<H>  /  AlkPhos  320<H>  08-30    PT/INR - ( 30 Aug 2019 09:31 )   PT: 13.4 sec;   INR: 1.16 ratio           Urinalysis Basic - ( 28 Aug 2019 23:23 )    Color: Dark Yellow / Appearance: Clear / S.022 / pH: x  Gluc: x / Ketone: Trace  / Bili: Negative / Urobili: Negative   Blood: x / Protein: 30 mg/dL / Nitrite: Negative   Leuk Esterase: Small / RBC: 124 /hpf / WBC 4 /HPF   Sq Epi: x / Non Sq Epi: 1 /hpf / Bacteria: Negative      CAPILLARY BLOOD GLUCOSE            Urinalysis Basic - ( 28 Aug 2019 23:23 )    Color: Dark Yellow / Appearance: Clear / S.022 / pH: x  Gluc: x / Ketone: Trace  / Bili: Negative / Urobili: Negative   Blood: x / Protein: 30 mg/dL / Nitrite: Negative   Leuk Esterase: Small / RBC: 124 /hpf / WBC 4 /HPF   Sq Epi: x / Non Sq Epi: 1 /hpf / Bacteria: Negative        RADIOLOGY & ADDITIONAL TESTS:    Imaging Personally Reviewed:  [x ] YES  [ ] NO

## 2019-08-30 NOTE — PROGRESS NOTE ADULT - SUBJECTIVE AND OBJECTIVE BOX
GENERAL SURGERY DAILY PROGRESS NOTE:     Interval:     SUBJECTIVE:     Patient feels well. Pain well controlled.  Denies chest pain, shortness of breath, nausea, vomiting, fever chills.     OBJECTIVE:    MEDICATIONS  (STANDING):  amLODIPine   Tablet 5 milliGRAM(s) Oral daily  dextrose 5% + sodium chloride 0.45% with potassium chloride 20 mEq/L 1000 milliLiter(s) (75 mL/Hr) IV Continuous <Continuous>  enoxaparin Injectable 40 milliGRAM(s) SubCutaneous daily  levothyroxine 150 MICROGram(s) Oral daily  losartan 50 milliGRAM(s) Oral daily  piperacillin/tazobactam IVPB.. 3.375 Gram(s) IV Intermittent every 8 hours  piperacillin/tazobactam IVPB..      potassium phosphate IVPB 15 milliMole(s) IV Intermittent once  simvastatin 10 milliGRAM(s) Oral at bedtime  tamsulosin 0.4 milliGRAM(s) Oral at bedtime    MEDICATIONS  (PRN):      Vital Signs Last 24 Hrs  T(C): 36.3 (30 Aug 2019 09:20), Max: 36.9 (29 Aug 2019 13:42)  T(F): 97.3 (30 Aug 2019 09:20), Max: 98.4 (29 Aug 2019 13:42)  HR: 73 (30 Aug 2019 09:20) (61 - 73)  BP: 113/76 (30 Aug 2019 09:20) (113/76 - 185/89)  BP(mean): --  RR: 18 (30 Aug 2019 09:20) (18 - 20)  SpO2: 96% (30 Aug 2019 09:20) (94% - 96%)      I&O's Detail    29 Aug 2019 07:01  -  30 Aug 2019 07:00  --------------------------------------------------------  IN:    dextrose 5% + sodium chloride 0.45% with potassium chloride 20 mEq/L: 525 mL    IV PiggyBack: 200 mL    lactated ringers.: 800 mL    Oral Fluid: 420 mL  Total IN: 1945 mL    OUT:    Voided: 2000 mL  Total OUT: 2000 mL    Total NET: -55 mL      30 Aug 2019 07:01  -  30 Aug 2019 11:20  --------------------------------------------------------  IN:    dextrose 5% + sodium chloride 0.45% with potassium chloride 20 mEq/L: 150 mL    IV PiggyBack: 50 mL  Total IN: 200 mL    OUT:  Total OUT: 0 mL    Total NET: 200 mL            Daily     Daily           LABS:                        9.7    6.05  )-----------( 157      ( 30 Aug 2019 09:52 )             29.4     08-30    137  |  101  |  12  ----------------------------<  85  3.5   |  25  |  0.95    Ca    9.2      30 Aug 2019 07:16  Phos  2.8     08-30  Mg     1.8     -30    TPro  6.2  /  Alb  3.1<L>  /  TBili  0.8  /  DBili  x   /  AST  204<H>  /  ALT  400<H>  /  AlkPhos  320<H>  08-30    PT/INR - ( 30 Aug 2019 09:31 )   PT: 13.4 sec;   INR: 1.16 ratio           Urinalysis Basic - ( 28 Aug 2019 23:23 )    Color: Dark Yellow / Appearance: Clear / S.022 / pH: x  Gluc: x / Ketone: Trace  / Bili: Negative / Urobili: Negative   Blood: x / Protein: 30 mg/dL / Nitrite: Negative   Leuk Esterase: Small / RBC: 124 /hpf / WBC 4 /HPF   Sq Epi: x / Non Sq Epi: 1 /hpf / Bacteria: Negative                PHYSICAL EXAM:  Constitutional: well developed, well nourished, NAD  ENMT: normal facies, symmetric  Respiratory: Normal respiratory effort   Gastrointestinal: abdomen soft, nontender, nondistended. No obvious masses.  Extremities:   Skin:  Musculoskeletal: equal strength bilateral upper and lower extremities  Psychiatric: oriented x 3; appropriate GENERAL SURGERY DAILY PROGRESS NOTE:     Interval: Seen by GI yesterday, plan for ERCP. CA markers sent CEA 4, CA 19-9: 101.    SUBJECTIVE: Pt seen and examined at bedside. States he is feeling well, denies n/v, abdominal pain. Tolerated clears yesterday.      OBJECTIVE:    MEDICATIONS  (STANDING):  amLODIPine   Tablet 5 milliGRAM(s) Oral daily  dextrose 5% + sodium chloride 0.45% with potassium chloride 20 mEq/L 1000 milliLiter(s) (75 mL/Hr) IV Continuous <Continuous>  enoxaparin Injectable 40 milliGRAM(s) SubCutaneous daily  levothyroxine 150 MICROGram(s) Oral daily  losartan 50 milliGRAM(s) Oral daily  piperacillin/tazobactam IVPB.. 3.375 Gram(s) IV Intermittent every 8 hours  piperacillin/tazobactam IVPB..      potassium phosphate IVPB 15 milliMole(s) IV Intermittent once  simvastatin 10 milliGRAM(s) Oral at bedtime  tamsulosin 0.4 milliGRAM(s) Oral at bedtime    MEDICATIONS  (PRN):      Vital Signs Last 24 Hrs  T(C): 36.3 (30 Aug 2019 09:20), Max: 36.9 (29 Aug 2019 13:42)  T(F): 97.3 (30 Aug 2019 09:20), Max: 98.4 (29 Aug 2019 13:42)  HR: 73 (30 Aug 2019 09:20) (61 - 73)  BP: 113/76 (30 Aug 2019 09:20) (113/76 - 185/89)  BP(mean): --  RR: 18 (30 Aug 2019 09:20) (18 - 20)  SpO2: 96% (30 Aug 2019 09:20) (94% - 96%)      I&O's Detail    29 Aug 2019 07:01  -  30 Aug 2019 07:00  --------------------------------------------------------  IN:    dextrose 5% + sodium chloride 0.45% with potassium chloride 20 mEq/L: 525 mL    IV PiggyBack: 200 mL    lactated ringers.: 800 mL    Oral Fluid: 420 mL  Total IN: 1945 mL    OUT:    Voided: 2000 mL  Total OUT: 2000 mL    Total NET: -55 mL      30 Aug 2019 07:01  -  30 Aug 2019 11:20  --------------------------------------------------------  IN:    dextrose 5% + sodium chloride 0.45% with potassium chloride 20 mEq/L: 150 mL    IV PiggyBack: 50 mL  Total IN: 200 mL    OUT:  Total OUT: 0 mL    Total NET: 200 mL        LABS:                        9.7    6.05  )-----------( 157      ( 30 Aug 2019 09:52 )             29.4         137  |  101  |  12  ----------------------------<  85  3.5   |  25  |  0.95    Ca    9.2      30 Aug 2019 07:16  Phos  2.8       Mg     1.8         TPro  6.2  /  Alb  3.1<L>  /  TBili  0.8  /  DBili  x   /  AST  204<H>  /  ALT  400<H>  /  AlkPhos  320<H>      PT/INR - ( 30 Aug 2019 09:31 )   PT: 13.4 sec;   INR: 1.16 ratio           Urinalysis Basic - ( 28 Aug 2019 23:23 )    Color: Dark Yellow / Appearance: Clear / S.022 / pH: x  Gluc: x / Ketone: Trace  / Bili: Negative / Urobili: Negative   Blood: x / Protein: 30 mg/dL / Nitrite: Negative   Leuk Esterase: Small / RBC: 124 /hpf / WBC 4 /HPF   Sq Epi: x / Non Sq Epi: 1 /hpf / Bacteria: Negative      PHYSICAL EXAM:  Constitutional: well developed, , NAD  ENMT: normal facies, symmetric  Respiratory: Normal respiratory effort   Gastrointestinal: abdomen soft, nontender, nondistended.   Psychiatric: oriented x 3; appropriate

## 2019-08-30 NOTE — PROGRESS NOTE ADULT - PROBLEM SELECTOR PLAN 3
/p PPM for bradycardia (unclear etiology) No active issues, chest pain free  - continue aspirin, statin, ARB  - likely not on beta blocker due to bradycardia in the past

## 2019-08-30 NOTE — PROGRESS NOTE ADULT - PROBLEM SELECTOR PLAN 1
Pt presented with post prandial abdominal pain since prior surgery. CT abdomen found gallstones in gallbladder remnant. Additionally was found to have slight prominence of the intrahepatic biliary ducts and mildly dilated CDB measuring up to 9 mm with choledocholith in the distal CBD measuring up to 6 mm.  - GI consulted plan for ERCP today.   - Abx as per surgery  - IVF while NPO.

## 2019-08-30 NOTE — PROGRESS NOTE ADULT - PROBLEM SELECTOR PLAN 4
BP elevated to 180's overnight  - continue at losartan 50mg po daily for now. If BP's continue to be elevated would increase Losartan dosage to 100mg

## 2019-08-30 NOTE — PHYSICAL THERAPY INITIAL EVALUATION ADULT - PERTINENT HX OF CURRENT PROBLEM, REHAB EVAL
84 y/oM admitted 8/29 p/w p/w persistent epigastric abdominal pain since lap cholecystectomy 3/2019. As per H&P Pt states that he's always had 4~8/10 abdominal pain since the surgery that never really went away, usually begins postprandial, and improves w/ time, no radiation or associated symptoms. Pain has prompted pt from PO intake, and pt endorses 12lb weight loss since the surgery. 2 days pta, pt became acutely worse. (cont below)

## 2019-08-30 NOTE — CHART NOTE - NSCHARTNOTEFT_GEN_A_CORE
Patient underwent ERCP today  Note to come in [place]    In brief, stone found in CBD  sphincterotomy made and stone removed with balloon sweep  duodenal polyp removed  no CBD brushing performed    CLD as tolerated today and then advance as tolerated  Followup path  CCY revision etc per surgery  Further care per primary team
STATUS POST: ERCP with sphincterotomy, balloon sweep, duodenal polypectomy    - no CBD brushing performed    POST OPERATIVE DAY #: 0    Vital Signs Last 24 Hrs  T(C): 36.9 (30 Aug 2019 21:17), Max: 36.9 (30 Aug 2019 21:17)  T(F): 98.4 (30 Aug 2019 21:17), Max: 98.4 (30 Aug 2019 21:17)  HR: 60 (30 Aug 2019 21:17) (60 - 73)  BP: 137/87 (30 Aug 2019 21:17) (113/76 - 170/87)  BP(mean): --  RR: 18 (30 Aug 2019 21:17) (17 - 18)  SpO2: 95% (30 Aug 2019 21:17) (94% - 96%)      SUBJECTIVE: Pt seen and examined at bedside. Reports mild throat soreness, improving. Voiding. Otherwise pain improved and well controlled. Tolerating clears, no n/v. Denies cp, sob, fever, chills. Passing gas. No BM.    General Appearance: Appears well, NAD  Neck: Supple  Chest: Equal expansion bilaterally, equal breath sounds  CV: Pulse regular presently  Abdomen: Soft, nontense, appropriate incisional tenderness, dressings clean and dry and intact  Extremities: Grossly symmetric, SCD's in place     I&O's Summary    29 Aug 2019 07:  -  30 Aug 2019 07:00  --------------------------------------------------------  IN: 1945 mL / OUT: 2000 mL / NET: -55 mL    30 Aug 2019 07:  -  30 Aug 2019 22:24  --------------------------------------------------------  IN: 1720 mL / OUT: 425 mL / NET: 1295 mL      I&O's Detail    29 Aug 2019 07:  -  30 Aug 2019 07:00  --------------------------------------------------------  IN:    dextrose 5% + sodium chloride 0.45% with potassium chloride 20 mEq/L: 525 mL    IV PiggyBack: 200 mL    lactated ringers.: 800 mL    Oral Fluid: 420 mL  Total IN: 1945 mL    OUT:    Voided: 2000 mL  Total OUT: 2000 mL    Total NET: -55 mL      30 Aug 2019 07:  -  30 Aug 2019 22:24  --------------------------------------------------------  IN:    dextrose 5% + sodium chloride 0.45% with potassium chloride 20 mEq/L: 750 mL    IV PiggyBack: 400 mL    Oral Fluid: 470 mL    Solution: 100 mL  Total IN: 1720 mL    OUT:    Voided: 425 mL  Total OUT: 425 mL    Total NET: 1295 mL          MEDICATIONS  (STANDING):  amLODIPine   Tablet 5 milliGRAM(s) Oral daily  dextrose 5% + sodium chloride 0.45% with potassium chloride 20 mEq/L 1000 milliLiter(s) (75 mL/Hr) IV Continuous <Continuous>  enoxaparin Injectable 40 milliGRAM(s) SubCutaneous daily  levothyroxine 150 MICROGram(s) Oral daily  losartan 50 milliGRAM(s) Oral daily  piperacillin/tazobactam IVPB.. 3.375 Gram(s) IV Intermittent every 8 hours  piperacillin/tazobactam IVPB..      simvastatin 10 milliGRAM(s) Oral at bedtime  tamsulosin 0.4 milliGRAM(s) Oral at bedtime    MEDICATIONS  (PRN):      LABS:                        9.7    6.05  )-----------( 157      ( 30 Aug 2019 09:52 )             29.4     08-30    137  |  101  |  12  ----------------------------<  85  3.5   |  25  |  0.95    Ca    9.2      30 Aug 2019 07:16  Phos  2.8     08-30  Mg     1.8     08-30    TPro  6.2  /  Alb  3.1<L>  /  TBili  0.8  /  DBili  x   /  AST  204<H>  /  ALT  400<H>  /  AlkPhos  320<H>  08-30    PT/INR - ( 30 Aug 2019 09:31 )   PT: 13.4 sec;   INR: 1.16 ratio           Urinalysis Basic - ( 28 Aug 2019 23:23 )    Color: Dark Yellow / Appearance: Clear / S.022 / pH: x  Gluc: x / Ketone: Trace  / Bili: Negative / Urobili: Negative   Blood: x / Protein: 30 mg/dL / Nitrite: Negative   Leuk Esterase: Small / RBC: 124 /hpf / WBC 4 /HPF   Sq Epi: x / Non Sq Epi: 1 /hpf / Bacteria: Negative        Assessment & Plan:   84M w/ PMH of CAD s/p MI (40+yrs ago), CABG, pacemaker, thyroid cancer (s/p thyroidectomy), breast cancer (s/p L mastectomy) and prostate cancer, and gallstone pancreatitis (s/p subtotal lap cholecystectomy, 2019, Dr. CHIQUI Mckeon), presenting with persistent epigastric abdominal pain since lap cholecystectomy, CTAP showing gallstones in the gallbladder remnant and distal CBD. Now s/p ERCP with sphincterotomy with balloon sweep for CBD stone removal and duodenal polypectomy.     - CLD today  - c/w IV zosyn   - f/u AM labs   - pain control   - OOB/ISS     ACS  [phone]

## 2019-08-31 ENCOUNTER — TRANSCRIPTION ENCOUNTER (OUTPATIENT)
Age: 84
End: 2019-08-31

## 2019-08-31 LAB
ALBUMIN SERPL ELPH-MCNC: 3 G/DL — LOW (ref 3.3–5)
ALP SERPL-CCNC: 265 U/L — HIGH (ref 40–120)
ALT FLD-CCNC: 284 U/L — HIGH (ref 10–45)
ANION GAP SERPL CALC-SCNC: 10 MMOL/L — SIGNIFICANT CHANGE UP (ref 5–17)
AST SERPL-CCNC: 97 U/L — HIGH (ref 10–40)
BILIRUB SERPL-MCNC: 0.6 MG/DL — SIGNIFICANT CHANGE UP (ref 0.2–1.2)
BUN SERPL-MCNC: 11 MG/DL — SIGNIFICANT CHANGE UP (ref 7–23)
CALCIUM SERPL-MCNC: 8.8 MG/DL — SIGNIFICANT CHANGE UP (ref 8.4–10.5)
CHLORIDE SERPL-SCNC: 105 MMOL/L — SIGNIFICANT CHANGE UP (ref 96–108)
CO2 SERPL-SCNC: 22 MMOL/L — SIGNIFICANT CHANGE UP (ref 22–31)
CREAT SERPL-MCNC: 0.99 MG/DL — SIGNIFICANT CHANGE UP (ref 0.5–1.3)
GLUCOSE SERPL-MCNC: 100 MG/DL — HIGH (ref 70–99)
HCT VFR BLD CALC: 31.1 % — LOW (ref 39–50)
HGB BLD-MCNC: 10 G/DL — LOW (ref 13–17)
LIDOCAIN IGE QN: 50 U/L — SIGNIFICANT CHANGE UP (ref 7–60)
MAGNESIUM SERPL-MCNC: 2 MG/DL — SIGNIFICANT CHANGE UP (ref 1.6–2.6)
MCHC RBC-ENTMCNC: 31.4 PG — SIGNIFICANT CHANGE UP (ref 27–34)
MCHC RBC-ENTMCNC: 32.2 GM/DL — SIGNIFICANT CHANGE UP (ref 32–36)
MCV RBC AUTO: 97.8 FL — SIGNIFICANT CHANGE UP (ref 80–100)
PHOSPHATE SERPL-MCNC: 3.1 MG/DL — SIGNIFICANT CHANGE UP (ref 2.5–4.5)
PLATELET # BLD AUTO: 150 K/UL — SIGNIFICANT CHANGE UP (ref 150–400)
POTASSIUM SERPL-MCNC: 4 MMOL/L — SIGNIFICANT CHANGE UP (ref 3.5–5.3)
POTASSIUM SERPL-SCNC: 4 MMOL/L — SIGNIFICANT CHANGE UP (ref 3.5–5.3)
PROT SERPL-MCNC: 5.9 G/DL — LOW (ref 6–8.3)
RBC # BLD: 3.18 M/UL — LOW (ref 4.2–5.8)
RBC # FLD: 13.9 % — SIGNIFICANT CHANGE UP (ref 10.3–14.5)
SODIUM SERPL-SCNC: 137 MMOL/L — SIGNIFICANT CHANGE UP (ref 135–145)
WBC # BLD: 6.62 K/UL — SIGNIFICANT CHANGE UP (ref 3.8–10.5)
WBC # FLD AUTO: 6.62 K/UL — SIGNIFICANT CHANGE UP (ref 3.8–10.5)

## 2019-08-31 PROCEDURE — 99232 SBSQ HOSP IP/OBS MODERATE 35: CPT

## 2019-08-31 RX ORDER — PHENYLEPHRINE-SHARK LIVER OIL-MINERAL OIL-PETROLATUM RECTAL OINTMENT
1 OINTMENT (GRAM) RECTAL
Refills: 0 | Status: DISCONTINUED | OUTPATIENT
Start: 2019-08-31 | End: 2019-09-02

## 2019-08-31 RX ORDER — POTASSIUM CHLORIDE 20 MEQ
20 PACKET (EA) ORAL ONCE
Refills: 0 | Status: COMPLETED | OUTPATIENT
Start: 2019-08-31 | End: 2019-08-31

## 2019-08-31 RX ADMIN — SIMVASTATIN 10 MILLIGRAM(S): 20 TABLET, FILM COATED ORAL at 22:08

## 2019-08-31 RX ADMIN — PIPERACILLIN AND TAZOBACTAM 25 GRAM(S): 4; .5 INJECTION, POWDER, LYOPHILIZED, FOR SOLUTION INTRAVENOUS at 22:08

## 2019-08-31 RX ADMIN — LOSARTAN POTASSIUM 50 MILLIGRAM(S): 100 TABLET, FILM COATED ORAL at 05:23

## 2019-08-31 RX ADMIN — TAMSULOSIN HYDROCHLORIDE 0.4 MILLIGRAM(S): 0.4 CAPSULE ORAL at 22:08

## 2019-08-31 RX ADMIN — PIPERACILLIN AND TAZOBACTAM 25 GRAM(S): 4; .5 INJECTION, POWDER, LYOPHILIZED, FOR SOLUTION INTRAVENOUS at 05:26

## 2019-08-31 RX ADMIN — PHENYLEPHRINE-SHARK LIVER OIL-MINERAL OIL-PETROLATUM RECTAL OINTMENT 1 APPLICATION(S): at 15:11

## 2019-08-31 RX ADMIN — DEXTROSE MONOHYDRATE, SODIUM CHLORIDE, AND POTASSIUM CHLORIDE 75 MILLILITER(S): 50; .745; 4.5 INJECTION, SOLUTION INTRAVENOUS at 13:10

## 2019-08-31 RX ADMIN — Medication 150 MICROGRAM(S): at 05:23

## 2019-08-31 RX ADMIN — PIPERACILLIN AND TAZOBACTAM 25 GRAM(S): 4; .5 INJECTION, POWDER, LYOPHILIZED, FOR SOLUTION INTRAVENOUS at 13:14

## 2019-08-31 RX ADMIN — ENOXAPARIN SODIUM 40 MILLIGRAM(S): 100 INJECTION SUBCUTANEOUS at 13:11

## 2019-08-31 RX ADMIN — Medication 20 MILLIEQUIVALENT(S): at 20:26

## 2019-08-31 RX ADMIN — AMLODIPINE BESYLATE 5 MILLIGRAM(S): 2.5 TABLET ORAL at 05:23

## 2019-08-31 RX ADMIN — DEXTROSE MONOHYDRATE, SODIUM CHLORIDE, AND POTASSIUM CHLORIDE 75 MILLILITER(S): 50; .745; 4.5 INJECTION, SOLUTION INTRAVENOUS at 05:25

## 2019-08-31 NOTE — DISCHARGE NOTE PROVIDER - HOSPITAL COURSE
8/28 CT AP with gallstones in the remnant gallbladder and cystic duct, choledocholiths w/ CBD dilatation upto 9mm. Started Zosyn.         8/29 GI consulted.         8/30: ERCP        Pt clinically and hemodynamically stable at dc 8/28 CT AP with gallstones in the remnant gallbladder and cystic duct, choledocholiths w/ CBD dilatation upto 9mm. Started Zosyn.         8/29 GI consulted. CA markers sent CEA 4, CA 19-9: 101.        8/30: ERCP sphincterotomy, balloon sweep, duodenal polypectomy. No brushings performed per GI attending. Procedure well tolerated and uncomplicated.     Patient tolerating clear liquid diet.         8/31 Pt advanced to regular, low fat diet. Well tolerated.         9/2 Pt clinically and hemodynamically stable, ambulating, voiding and tolerating diet. Stable for discharge.

## 2019-08-31 NOTE — PROGRESS NOTE ADULT - ASSESSMENT
Impression:  # choledocolithiasis: s/p CBD stone removal and sphincterotomy w/ balloon sweep on 8/30  # Duodoneal polyp: resected   # stones in remnant GB  # CABG  # H/o thyroid ca, h/o breast cancer    Recs:  - advance diet as tolerated  - Followup path  - CCY revision per surgery  - supportive care per primary team

## 2019-08-31 NOTE — DISCHARGE NOTE PROVIDER - NSFOLLOWUPCLINICS_GEN_ALL_ED_FT
[facility]  General Surgery  [address] [place] [address] - [address] [place] [address]  Phone: [phone]  Fax:   Follow Up Time: Routine

## 2019-08-31 NOTE — PROGRESS NOTE ADULT - SUBJECTIVE AND OBJECTIVE BOX
Chief Complaint:  Patient is a 84y old  Male who presents with a chief complaint of abdominal pain (30 Aug 2019 11:44)      Interval Events: No new events. Having vague RUQ soreness. No abdominal pain, vomiting, nausea or diarrhea. Feels constipted and attributes to not eating.    Allergies:  lidocaine (Other)      Hospital Medications:  amLODIPine   Tablet 5 milliGRAM(s) Oral daily  dextrose 5% + sodium chloride 0.45% with potassium chloride 20 mEq/L 1000 milliLiter(s) IV Continuous <Continuous>  enoxaparin Injectable 40 milliGRAM(s) SubCutaneous daily  levothyroxine 150 MICROGram(s) Oral daily  losartan 50 milliGRAM(s) Oral daily  piperacillin/tazobactam IVPB.. 3.375 Gram(s) IV Intermittent every 8 hours  piperacillin/tazobactam IVPB..      simvastatin 10 milliGRAM(s) Oral at bedtime  tamsulosin 0.4 milliGRAM(s) Oral at bedtime      PMHX/PSHX:  Varicose vein  Hypothyroidism  Thyroid cancer  Breast cancer  Prostate cancer  HTN (hypertension)  HLD (hyperlipidemia)  CAD (coronary artery disease)  S/P cholecystectomy  S/P left mastectomy  S/P thyroidectomy  S/P CABG x 2      Family history:      ROS:     General:  No wt loss, fevers, chills, night sweats, fatigue,   Eyes:  Good vision, no reported pain  ENT:  No sore throat, pain, runny nose, dysphagia  CV:  No pain, palpitations, hypo/hypertension  Resp:  No dyspnea, cough, tachypnea, wheezing  GI:  See HPI  :  No pain, bleeding, incontinence, nocturia  Muscle:  No pain, weakness  Neuro:  No weakness, tingling, memory problems  Psych:  No fatigue, insomnia, mood problems, depression  Endocrine:  No polyuria, polydipsia, cold/heat intolerance  Heme:  No petechiae, ecchymosis, easy bruisability  Skin:  No rash, edema      PHYSICAL EXAM:     Vital Signs:  Vital Signs Last 24 Hrs  T(C): 36.6 (31 Aug 2019 09:15), Max: 36.9 (30 Aug 2019 21:17)  T(F): 97.9 (31 Aug 2019 09:15), Max: 98.4 (30 Aug 2019 21:17)  HR: 61 (31 Aug 2019 09:15) (60 - 66)  BP: 107/75 (31 Aug 2019 09:15) (107/75 - 170/87)  BP(mean): --  RR: 17 (31 Aug 2019 09:15) (17 - 18)  SpO2: 96% (31 Aug 2019 09:15) (94% - 96%)  Daily     Daily     GENERAL:  appears comfortable, no acute distress  HEENT:  NC/AT,  conjunctivae clear, sclera -anicteric  CHEST:  CTABL, no increased effort  HEART:  Regular rhythm, S1, S2, no murmur/rub/S3/S4  ABDOMEN:  Soft, non-tender, non-distended, normoactive bowel sounds,  no masses ,no hepato-splenomegaly,   EXTREMITIES:  no cyanosis, clubbing or edema  SKIN:  No rash/erythema/ecchymoses/petechiae/wounds  NEURO:  Alert, oriented    LABS:                        10.0   6.62  )-----------( 150      ( 31 Aug 2019 10:32 )             31.1     08-31    137  |  105  |  11  ----------------------------<  100<H>  4.0   |  22  |  0.99    Ca    8.8      31 Aug 2019 07:23  Phos  3.1     08-31  Mg     2.0     08-31    TPro  5.9<L>  /  Alb  3.0<L>  /  TBili  0.6  /  DBili  x   /  AST  97<H>  /  ALT  284<H>  /  AlkPhos  265<H>  08-31    LIVER FUNCTIONS - ( 31 Aug 2019 07:23 )  Alb: 3.0 g/dL / Pro: 5.9 g/dL / ALK PHOS: 265 U/L / ALT: 284 U/L / AST: 97 U/L / GGT: x           PT/INR - ( 30 Aug 2019 09:31 )   PT: 13.4 sec;   INR: 1.16 ratio             Amylase Serum--      Lipase serum50       Ammonia--      Imaging:  ERCP:  In brief, stone found in CBD  sphincterotomy made and stone removed with balloon sweep  duodenal polyp removed  no CBD brushing performed

## 2019-08-31 NOTE — DISCHARGE NOTE PROVIDER - NSDCCPCAREPLAN_GEN_ALL_CORE_FT
PRINCIPAL DISCHARGE DIAGNOSIS  Diagnosis: Abdominal pain  Assessment and Plan of Treatment: PRINCIPAL DISCHARGE DIAGNOSIS  Diagnosis: Abdominal pain  Assessment and Plan of Treatment: Your abdominal pain was due to gallstones lodged in the duct. You had a procedure to remove the stone. Please make an appointment with Dr. Perdue to continue to manage.

## 2019-08-31 NOTE — DISCHARGE NOTE PROVIDER - CARE PROVIDER_API CALL
Art Perdue)  Surgery  [address] [place] [address]  Phone: [phone]  Fax: [phone]  Follow Up Time:

## 2019-08-31 NOTE — DISCHARGE NOTE PROVIDER - NSDCCPTREATMENT_GEN_ALL_CORE_FT
PRINCIPAL PROCEDURE  Procedure: ERCP  Findings and Treatment: PRINCIPAL PROCEDURE  Procedure: ERCP  Findings and Treatment: A stone was found in your common bile duct, a cut a  stone was removed with balloon sweep and a duodenal polyp removed.   Dr. Perdue, hepatobiliary surgeon, was contacted in order to further manage.

## 2019-08-31 NOTE — PROGRESS NOTE ADULT - SUBJECTIVE AND OBJECTIVE BOX
ACUTE CARE SURGERY/TRAUMA PROGRESS NOTE    Service: ACS Team (pager [phone])    Patient is s/p ERCP with sphincterotomy, balloon-sweep removal of CBD stones, and resection of duodenal polyp. Patient is recovering well, tolerating a clear liquid diet, and reports minimal RUQ pain, and no N/V.      MEDICATIONS  (STANDING):  amLODIPine   Tablet 5 milliGRAM(s) Oral daily  dextrose 5% + sodium chloride 0.45% with potassium chloride 20 mEq/L 1000 milliLiter(s) (75 mL/Hr) IV Continuous <Continuous>  enoxaparin Injectable 40 milliGRAM(s) SubCutaneous daily  levothyroxine 150 MICROGram(s) Oral daily  losartan 50 milliGRAM(s) Oral daily  piperacillin/tazobactam IVPB.. 3.375 Gram(s) IV Intermittent every 8 hours  piperacillin/tazobactam IVPB..      simvastatin 10 milliGRAM(s) Oral at bedtime  tamsulosin 0.4 milliGRAM(s) Oral at bedtime    MEDICATIONS  (PRN):  hemorrhoidal Ointment 1 Application(s) Rectal four times a day PRN hemorrhoids    Diet: CLD      VITALS & I/Os:  Vital Signs Last 24 Hrs  T(C): 36.7 (31 Aug 2019 13:14), Max: 36.9 (30 Aug 2019 21:17)  T(F): 98.1 (31 Aug 2019 13:14), Max: 98.4 (30 Aug 2019 21:17)  HR: 79 (31 Aug 2019 13:14) (60 - 79)  BP: 111/76 (31 Aug 2019 13:14) (107/75 - 170/87)  BP(mean): --  RR: 19 (31 Aug 2019 13:14) (17 - 19)  SpO2: 96% (31 Aug 2019 13:14) (94% - 96%)  CAPILLARY BLOOD GLUCOSE            08-30 @ 07:01  -  08-31 @ 07:00  --------------------------------------------------------  IN:    dextrose 5% + sodium chloride 0.45% with potassium chloride 20 mEq/L: 1650 mL    IV PiggyBack: 400 mL    Oral Fluid: 590 mL    Solution: 200 mL  Total IN: 2840 mL    OUT:    Voided: 1275 mL  Total OUT: 1275 mL    Total NET: 1565 mL      08-31 @ 07:01  -  08-31 @ 16:40  --------------------------------------------------------  IN:    dextrose 5% + sodium chloride 0.45% with potassium chloride 20 mEq/L: 525 mL    Oral Fluid: 440 mL    Solution: 100 mL  Total IN: 1065 mL    OUT:    Voided: 400 mL  Total OUT: 400 mL    Total NET: 665 mL          GEN: NAD, alert and oriented x 3  HEENT: WNL  CHEST: Symmetrical chest rise, breath sounds CTAB  HEART: RRR, non-muffled heart sounds  ABD: Soft, non-tender, non-distended  EXT/VASC: Warm, cap refill < 2 sec, motor/sensory intact      LABS:                        10.0   6.62  )-----------( 150      ( 31 Aug 2019 10:32 )             31.1     08-31    137  |  105  |  11  ----------------------------<  100<H>  4.0   |  22  |  0.99    Ca    8.8      31 Aug 2019 07:23  Phos  3.1     08-31  Mg     2.0     08-31    TPro  5.9<L>  /  Alb  3.0<L>  /  TBili  0.6  /  DBili  x   /  AST  97<H>  /  ALT  284<H>  /  AlkPhos  265<H>  08-31    Lactate:  PT/INR - ( 30 Aug 2019 09:31 )   PT: 13.4 sec;   INR: 1.16 ratio               ASSESSMENT & PLAN  84y M - h/o of subtotal laparoscopic cholecystectomy - admitted with gallstones of the gallbladder remnant and choledocholithiasis, now s/p ERCP with CBD extraction, sphincterotomy and duodenal polyp removal. 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 Hepatobiliary surgeon (Dr. Art Perdue) consulted for future completion cholecystectomy, or alternate operative planning (depending on pathology of duodenal polyp and CA 19-9 trend).   - Advance diet to Lowfat Regular  - Mobilize patient out of bed  - Trend CMP and CA 19-9  - Replenish serum potassium and magnesium      Discussed with Dr. Padgett

## 2019-08-31 NOTE — DISCHARGE NOTE PROVIDER - NSDCFUSCHEDAPPT_GEN_ALL_CORE_FT
SU [place] ; 11/07/2019 ; [facility] [address] [place] ; 11/14/2019 ; NP Cardio [address] [place] ; 11/07/2019 ; [facility] [address] [place] ; 11/14/2019 ; NP Cardio [address] [place] ; 11/07/2019 ; [facility] [address] [place] ; 11/14/2019 ; NP Cardio [address] [place] ; 11/07/2019 ; [facility] [address] [place] ; 11/14/2019 ; NP Cardio [address]

## 2019-09-01 LAB
ALBUMIN SERPL ELPH-MCNC: 3.2 G/DL — LOW (ref 3.3–5)
ALP SERPL-CCNC: 218 U/L — HIGH (ref 40–120)
ALT FLD-CCNC: 214 U/L — HIGH (ref 10–45)
ANION GAP SERPL CALC-SCNC: 12 MMOL/L — SIGNIFICANT CHANGE UP (ref 5–17)
AST SERPL-CCNC: 61 U/L — HIGH (ref 10–40)
BILIRUB SERPL-MCNC: 0.4 MG/DL — SIGNIFICANT CHANGE UP (ref 0.2–1.2)
BUN SERPL-MCNC: 15 MG/DL — SIGNIFICANT CHANGE UP (ref 7–23)
CALCIUM SERPL-MCNC: 8.9 MG/DL — SIGNIFICANT CHANGE UP (ref 8.4–10.5)
CANCER AG19-9 SERPL-ACNC: 31 U/ML — SIGNIFICANT CHANGE UP
CEA SERPL-MCNC: 3.7 NG/ML — SIGNIFICANT CHANGE UP (ref 0–3.8)
CHLORIDE SERPL-SCNC: 105 MMOL/L — SIGNIFICANT CHANGE UP (ref 96–108)
CO2 SERPL-SCNC: 23 MMOL/L — SIGNIFICANT CHANGE UP (ref 22–31)
CREAT SERPL-MCNC: 0.92 MG/DL — SIGNIFICANT CHANGE UP (ref 0.5–1.3)
GLUCOSE SERPL-MCNC: 69 MG/DL — LOW (ref 70–99)
HCT VFR BLD CALC: 28 % — LOW (ref 39–50)
HGB BLD-MCNC: 8.7 G/DL — LOW (ref 13–17)
MAGNESIUM SERPL-MCNC: 1.8 MG/DL — SIGNIFICANT CHANGE UP (ref 1.6–2.6)
MCHC RBC-ENTMCNC: 30.5 PG — SIGNIFICANT CHANGE UP (ref 27–34)
MCHC RBC-ENTMCNC: 31.1 GM/DL — LOW (ref 32–36)
MCV RBC AUTO: 98.2 FL — SIGNIFICANT CHANGE UP (ref 80–100)
PHOSPHATE SERPL-MCNC: 2.5 MG/DL — SIGNIFICANT CHANGE UP (ref 2.5–4.5)
PLATELET # BLD AUTO: 151 K/UL — SIGNIFICANT CHANGE UP (ref 150–400)
POTASSIUM SERPL-MCNC: 4 MMOL/L — SIGNIFICANT CHANGE UP (ref 3.5–5.3)
POTASSIUM SERPL-SCNC: 4 MMOL/L — SIGNIFICANT CHANGE UP (ref 3.5–5.3)
PROT SERPL-MCNC: 6 G/DL — SIGNIFICANT CHANGE UP (ref 6–8.3)
RBC # BLD: 2.85 M/UL — LOW (ref 4.2–5.8)
RBC # FLD: 14.1 % — SIGNIFICANT CHANGE UP (ref 10.3–14.5)
SODIUM SERPL-SCNC: 140 MMOL/L — SIGNIFICANT CHANGE UP (ref 135–145)
WBC # BLD: 7.02 K/UL — SIGNIFICANT CHANGE UP (ref 3.8–10.5)
WBC # FLD AUTO: 7.02 K/UL — SIGNIFICANT CHANGE UP (ref 3.8–10.5)

## 2019-09-01 RX ORDER — SODIUM,POTASSIUM PHOSPHATES 278-250MG
2 POWDER IN PACKET (EA) ORAL ONCE
Refills: 0 | Status: COMPLETED | OUTPATIENT
Start: 2019-09-01 | End: 2019-09-01

## 2019-09-01 RX ORDER — MAGNESIUM SULFATE 500 MG/ML
2 VIAL (ML) INJECTION ONCE
Refills: 0 | Status: COMPLETED | OUTPATIENT
Start: 2019-09-01 | End: 2019-09-01

## 2019-09-01 RX ADMIN — PHENYLEPHRINE-SHARK LIVER OIL-MINERAL OIL-PETROLATUM RECTAL OINTMENT 1 APPLICATION(S): at 11:30

## 2019-09-01 RX ADMIN — AMLODIPINE BESYLATE 5 MILLIGRAM(S): 2.5 TABLET ORAL at 05:01

## 2019-09-01 RX ADMIN — LOSARTAN POTASSIUM 50 MILLIGRAM(S): 100 TABLET, FILM COATED ORAL at 05:01

## 2019-09-01 RX ADMIN — Medication 50 GRAM(S): at 11:28

## 2019-09-01 RX ADMIN — PIPERACILLIN AND TAZOBACTAM 25 GRAM(S): 4; .5 INJECTION, POWDER, LYOPHILIZED, FOR SOLUTION INTRAVENOUS at 05:01

## 2019-09-01 RX ADMIN — Medication 2 PACKET(S): at 11:28

## 2019-09-01 RX ADMIN — TAMSULOSIN HYDROCHLORIDE 0.4 MILLIGRAM(S): 0.4 CAPSULE ORAL at 21:23

## 2019-09-01 RX ADMIN — Medication 150 MICROGRAM(S): at 05:01

## 2019-09-01 RX ADMIN — ENOXAPARIN SODIUM 40 MILLIGRAM(S): 100 INJECTION SUBCUTANEOUS at 11:29

## 2019-09-01 RX ADMIN — SIMVASTATIN 10 MILLIGRAM(S): 20 TABLET, FILM COATED ORAL at 21:23

## 2019-09-01 NOTE — PROGRESS NOTE ADULT - SUBJECTIVE AND OBJECTIVE BOX
GENERAL SURGERY DAILY PROGRESS NOTE:       Interval: Tolerating low fat diet.     SUBJECTIVE:     Patient feels well. Pain well controlled. Reports lower quadrant abdominal discomfort after resuming low fat diet yesterday, 1 episode of urinary and bowel incontinence.  Denies chest pain, shortness of breath, nausea, vomiting, fever chills.     OBJECTIVE:    MEDICATIONS  (STANDING):  amLODIPine   Tablet 5 milliGRAM(s) Oral daily  enoxaparin Injectable 40 milliGRAM(s) SubCutaneous daily  levothyroxine 150 MICROGram(s) Oral daily  losartan 50 milliGRAM(s) Oral daily  magnesium sulfate  IVPB 2 Gram(s) IV Intermittent once  piperacillin/tazobactam IVPB.. 3.375 Gram(s) IV Intermittent every 8 hours  piperacillin/tazobactam IVPB..      potassium phosphate / sodium phosphate powder 2 Packet(s) Oral once  simvastatin 10 milliGRAM(s) Oral at bedtime  tamsulosin 0.4 milliGRAM(s) Oral at bedtime    MEDICATIONS  (PRN):  hemorrhoidal Ointment 1 Application(s) Rectal four times a day PRN hemorrhoids      Vital Signs Last 24 Hrs  T(C): 36.5 (01 Sep 2019 09:15), Max: 36.8 (31 Aug 2019 16:51)  T(F): 97.7 (01 Sep 2019 09:15), Max: 98.2 (31 Aug 2019 16:51)  HR: 77 (01 Sep 2019 09:15) (62 - 79)  BP: 100/67 (01 Sep 2019 09:15) (100/67 - 158/78)  BP(mean): --  RR: 18 (01 Sep 2019 09:15) (18 - 19)  SpO2: 96% (01 Sep 2019 09:15) (94% - 97%)      I&O's Detail    31 Aug 2019 07:01  -  01 Sep 2019 07:00  --------------------------------------------------------  IN:    dextrose 5% + sodium chloride 0.45% with potassium chloride 20 mEq/L: 825 mL    Oral Fluid: 1120 mL    Solution: 300 mL  Total IN: 2245 mL    OUT:    Voided: 1300 mL  Total OUT: 1300 mL    Total NET: 945 mL      01 Sep 2019 07:01  -  01 Sep 2019 10:05  --------------------------------------------------------  IN:    Oral Fluid: 240 mL  Total IN: 240 mL    OUT:  Total OUT: 0 mL    Total NET: 240 mL      LABS:                        10.0   6.62  )-----------( 150      ( 31 Aug 2019 10:32 )             31.1     09-01    140  |  105  |  15  ----------------------------<  69<L>  4.0   |  23  |  0.92    Ca    8.9      01 Sep 2019 07:14  Phos  2.5     09-01  Mg     1.8     09-01    TPro  6.0  /  Alb  3.2<L>  /  TBili  0.4  /  DBili  x   /  AST  61<H>  /  ALT  214<H>  /  AlkPhos  218<H>  09-01      PHYSICAL EXAM:  Constitutional: well developed, well nourished, NAD  ENMT: normal facies, symmetric  Respiratory: Normal respiratory effort   Gastrointestinal: abdomen soft, nontender, nondistended. GENERAL SURGERY DAILY PROGRESS NOTE:       Interval: Tolerating low fat diet.     SUBJECTIVE:     Patient feels well. Pain well controlled. Reports lower quadrant abdominal discomfort after resuming low fat diet yesterday, 1 episode of urinary and bowel incontinence with liquid stool. Denies chest pain, shortness of breath, nausea, vomiting, fever chills.     OBJECTIVE:    MEDICATIONS  (STANDING):  amLODIPine   Tablet 5 milliGRAM(s) Oral daily  enoxaparin Injectable 40 milliGRAM(s) SubCutaneous daily  levothyroxine 150 MICROGram(s) Oral daily  losartan 50 milliGRAM(s) Oral daily  magnesium sulfate  IVPB 2 Gram(s) IV Intermittent once  piperacillin/tazobactam IVPB.. 3.375 Gram(s) IV Intermittent every 8 hours  piperacillin/tazobactam IVPB..      potassium phosphate / sodium phosphate powder 2 Packet(s) Oral once  simvastatin 10 milliGRAM(s) Oral at bedtime  tamsulosin 0.4 milliGRAM(s) Oral at bedtime    MEDICATIONS  (PRN):  hemorrhoidal Ointment 1 Application(s) Rectal four times a day PRN hemorrhoids      Vital Signs Last 24 Hrs  T(C): 36.5 (01 Sep 2019 09:15), Max: 36.8 (31 Aug 2019 16:51)  T(F): 97.7 (01 Sep 2019 09:15), Max: 98.2 (31 Aug 2019 16:51)  HR: 77 (01 Sep 2019 09:15) (62 - 79)  BP: 100/67 (01 Sep 2019 09:15) (100/67 - 158/78)  BP(mean): --  RR: 18 (01 Sep 2019 09:15) (18 - 19)  SpO2: 96% (01 Sep 2019 09:15) (94% - 97%)      I&O's Detail    31 Aug 2019 07:01  -  01 Sep 2019 07:00  --------------------------------------------------------  IN:    dextrose 5% + sodium chloride 0.45% with potassium chloride 20 mEq/L: 825 mL    Oral Fluid: 1120 mL    Solution: 300 mL  Total IN: 2245 mL    OUT:    Voided: 1300 mL  Total OUT: 1300 mL    Total NET: 945 mL      01 Sep 2019 07:01  -  01 Sep 2019 10:05  --------------------------------------------------------  IN:    Oral Fluid: 240 mL  Total IN: 240 mL    OUT:  Total OUT: 0 mL    Total NET: 240 mL      LABS:                        10.0   6.62  )-----------( 150      ( 31 Aug 2019 10:32 )             31.1     09-01    140  |  105  |  15  ----------------------------<  69<L>  4.0   |  23  |  0.92    Ca    8.9      01 Sep 2019 07:14  Phos  2.5     09-01  Mg     1.8     09-01    TPro  6.0  /  Alb  3.2<L>  /  TBili  0.4  /  DBili  x   /  AST  61<H>  /  ALT  214<H>  /  AlkPhos  218<H>  09-01      PHYSICAL EXAM:  Constitutional: well developed, well nourished, NAD  ENMT: normal facies, symmetric  Respiratory: Normal respiratory effort   Gastrointestinal: abdomen soft, nontender, nondistended.

## 2019-09-01 NOTE — PROVIDER CONTACT NOTE (OTHER) - ACTION/TREATMENT ORDERED:
MD aware, RN will monitor.
PA aware new sample not necessary
MD aware, at bedside, RN to monitor
Md aware. Will await further orders.

## 2019-09-01 NOTE — PROVIDER CONTACT NOTE (OTHER) - SITUATION
cdiff sample sent to lab, lab called and cancelled specimen because stool was "too formed"
pt with elevated BP, approx 150/90
Elevated /89
pt with BP of approx 170/90, prior BP approx 150/90

## 2019-09-01 NOTE — PROGRESS NOTE ADULT - ASSESSMENT
84y M - h/o of subtotal laparoscopic cholecystectomy - admitted with gallstones of the gallbladder remnant and choledocholithiasis, now s/p ERCP with CBD extraction on 8/30, sphincterotomy and duodenal polyp removal. 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 Hepatobiliary surgeon (Dr. Art Perdue) consulted for future completion cholecystectomy, or alternate operative planning (depending on pathology of duodenal polyp and CA 19-9 trend).     - c/w low fat diet   - OOB/ISS   - f/u AM labs   - Plan for d/c home   - Will f/u with Dr. Hernandez outpatient     ATP  [id number] 84y M - h/o of subtotal laparoscopic cholecystectomy - admitted with gallstones of the gallbladder remnant and choledocholithiasis, now s/p ERCP with CBD extraction on 8/30, sphincterotomy and duodenal polyp removal. 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 Hepatobiliary surgeon (Dr. Art Perdue) consulted for future completion cholecystectomy, or alternate operative planning (depending on pathology of duodenal polyp and CA 19-9 trend).     - c/w low fat diet   - f/u c. diff   - OOB/ISS   - f/u AM labs   - PT: home with no skilled needs   - Will f/u with Dr. Hernandez outpatient     ATP  [id number]

## 2019-09-02 ENCOUNTER — TRANSCRIPTION ENCOUNTER (OUTPATIENT)
Age: 84
End: 2019-09-02

## 2019-09-02 VITALS
RESPIRATION RATE: 18 BRPM | TEMPERATURE: 98 F | OXYGEN SATURATION: 98 % | HEART RATE: 71 BPM | DIASTOLIC BLOOD PRESSURE: 79 MMHG | SYSTOLIC BLOOD PRESSURE: 124 MMHG

## 2019-09-02 LAB
ALBUMIN SERPL ELPH-MCNC: 3 G/DL — LOW (ref 3.3–5)
ALP SERPL-CCNC: 193 U/L — HIGH (ref 40–120)
ALT FLD-CCNC: 161 U/L — HIGH (ref 10–45)
ANION GAP SERPL CALC-SCNC: 13 MMOL/L — SIGNIFICANT CHANGE UP (ref 5–17)
AST SERPL-CCNC: 43 U/L — HIGH (ref 10–40)
BILIRUB SERPL-MCNC: 0.3 MG/DL — SIGNIFICANT CHANGE UP (ref 0.2–1.2)
BUN SERPL-MCNC: 15 MG/DL — SIGNIFICANT CHANGE UP (ref 7–23)
CALCIUM SERPL-MCNC: 8.8 MG/DL — SIGNIFICANT CHANGE UP (ref 8.4–10.5)
CHLORIDE SERPL-SCNC: 104 MMOL/L — SIGNIFICANT CHANGE UP (ref 96–108)
CO2 SERPL-SCNC: 22 MMOL/L — SIGNIFICANT CHANGE UP (ref 22–31)
CREAT SERPL-MCNC: 0.86 MG/DL — SIGNIFICANT CHANGE UP (ref 0.5–1.3)
GLUCOSE SERPL-MCNC: 76 MG/DL — SIGNIFICANT CHANGE UP (ref 70–99)
HCT VFR BLD CALC: 27.8 % — LOW (ref 39–50)
HGB BLD-MCNC: 9.1 G/DL — LOW (ref 13–17)
MAGNESIUM SERPL-MCNC: 1.9 MG/DL — SIGNIFICANT CHANGE UP (ref 1.6–2.6)
MCHC RBC-ENTMCNC: 32.2 PG — SIGNIFICANT CHANGE UP (ref 27–34)
MCHC RBC-ENTMCNC: 32.7 GM/DL — SIGNIFICANT CHANGE UP (ref 32–36)
MCV RBC AUTO: 98.2 FL — SIGNIFICANT CHANGE UP (ref 80–100)
PHOSPHATE SERPL-MCNC: 3.2 MG/DL — SIGNIFICANT CHANGE UP (ref 2.5–4.5)
PLATELET # BLD AUTO: 158 K/UL — SIGNIFICANT CHANGE UP (ref 150–400)
POTASSIUM SERPL-MCNC: 3.8 MMOL/L — SIGNIFICANT CHANGE UP (ref 3.5–5.3)
POTASSIUM SERPL-SCNC: 3.8 MMOL/L — SIGNIFICANT CHANGE UP (ref 3.5–5.3)
PROT SERPL-MCNC: 6 G/DL — SIGNIFICANT CHANGE UP (ref 6–8.3)
RBC # BLD: 2.83 M/UL — LOW (ref 4.2–5.8)
RBC # FLD: 14.5 % — SIGNIFICANT CHANGE UP (ref 10.3–14.5)
SODIUM SERPL-SCNC: 139 MMOL/L — SIGNIFICANT CHANGE UP (ref 135–145)
WBC # BLD: 6.25 K/UL — SIGNIFICANT CHANGE UP (ref 3.8–10.5)
WBC # FLD AUTO: 6.25 K/UL — SIGNIFICANT CHANGE UP (ref 3.8–10.5)

## 2019-09-02 PROCEDURE — 99238 HOSP IP/OBS DSCHRG MGMT 30/<: CPT

## 2019-09-02 RX ADMIN — LOSARTAN POTASSIUM 50 MILLIGRAM(S): 100 TABLET, FILM COATED ORAL at 06:09

## 2019-09-02 RX ADMIN — Medication 150 MICROGRAM(S): at 06:09

## 2019-09-02 RX ADMIN — AMLODIPINE BESYLATE 5 MILLIGRAM(S): 2.5 TABLET ORAL at 06:09

## 2019-09-02 RX ADMIN — ENOXAPARIN SODIUM 40 MILLIGRAM(S): 100 INJECTION SUBCUTANEOUS at 12:01

## 2019-09-02 NOTE — DISCHARGE NOTE NURSING/CASE MANAGEMENT/SOCIAL WORK - PATIENT PORTAL LINK FT
You can access the FollowMyHealth Patient Portal offered by [facility] by registering at the following website: [email or web address]. By joining [facility]’s FollowMyHealth portal, you will also be able to view your health information using other applications (apps) compatible with our system.

## 2019-09-02 NOTE — PROGRESS NOTE ADULT - SUBJECTIVE AND OBJECTIVE BOX
ACS Progress Note    SUBJECTIVE: Pt seen and examined at bedside. No acute events overnight. Tolerating diet. Pain well controlled. Diarrhea has improved. Denies chest pain, SOB, N/V, fevers, or chills.     [place]:  ESTRELLAT:  MARCELLUS:  MEDICATIONS  (STANDING):  amLODIPine   Tablet 5 milliGRAM(s) Oral daily  enoxaparin Injectable 40 milliGRAM(s) SubCutaneous daily  levothyroxine 150 MICROGram(s) Oral daily  losartan 50 milliGRAM(s) Oral daily  simvastatin 10 milliGRAM(s) Oral at bedtime  tamsulosin 0.4 milliGRAM(s) Oral at bedtime    MEDICATIONS  (PRN):  hemorrhoidal Ointment 1 Application(s) Rectal four times a day PRN hemorrhoids      OBJECTIVE:    Vital Signs Last 24 Hrs  T(C): 36.9 (02 Sep 2019 08:43), Max: 37.2 (01 Sep 2019 17:16)  T(F): 98.5 (02 Sep 2019 08:43), Max: 98.9 (01 Sep 2019 17:16)  HR: 62 (02 Sep 2019 08:43) (60 - 67)  BP: 109/73 (02 Sep 2019 08:43) (103/68 - 156/91)  BP(mean): --  RR: 18 (02 Sep 2019 08:43) (17 - 18)  SpO2: 95% (02 Sep 2019 08:43) (93% - 97%)    General Appearance: NAD  Neck: Supple  Chest: non-labored breathing, no respiratory distress  CV: Pulse regular presently  Abdomen: Soft, non-tender, non-distended  Extremities: warm and well perfused    I&O's Summary    01 Sep 2019 07:01  -  02 Sep 2019 07:00  --------------------------------------------------------  IN: 1130 mL / OUT: 951 mL / NET: 179 mL    02 Sep 2019 07:01  -  02 Sep 2019 12:38  --------------------------------------------------------  IN: 480 mL / OUT: 550 mL / NET: -70 mL      I&O's Detail    01 Sep 2019 07:01  -  02 Sep 2019 07:00  --------------------------------------------------------  IN:    IV PiggyBack: 50 mL    Oral Fluid: 1080 mL  Total IN: 1130 mL    OUT:    Voided: 951 mL  Total OUT: 951 mL    Total NET: 179 mL      02 Sep 2019 07:01  -  02 Sep 2019 12:38  --------------------------------------------------------  IN:    Oral Fluid: 480 mL  Total IN: 480 mL    OUT:    Voided: 550 mL  Total OUT: 550 mL    Total NET: -70 mL            LABS:                        9.1    6.25  )-----------( 158      ( 02 Sep 2019 09:01 )             27.8     09-02    139  |  104  |  15  ----------------------------<  76  3.8   |  22  |  0.86    Ca    8.8      02 Sep 2019 07:11  Phos  3.2     09-02  Mg     1.9     09-02    TPro  6.0  /  Alb  3.0<L>  /  TBili  0.3  /  DBili  x   /  AST  43<H>  /  ALT  161<H>  /  AlkPhos  193<H>  09-02          RADIOLOGY & ADDITIONAL STUDIES:

## 2019-09-02 NOTE — PROGRESS NOTE ADULT - ATTENDING COMMENTS
Patient seen and examined on AM rounds. Chart reviewed. Resident note confirmed. Pt is an 84 year old male with a medical history significant for CAD s/p MI (40+yrs ago), CABG, pacemaker, thyroid cancer (s/p thyroidectomy), breast cancer (s/p L mastectomy), prostate cancer, and gallstone pancreatitis (s/p subtotal lap cholecystectomy, March 2019, Dr. CHIQUI Mckeon), who returned to [facility] with persistent epigastric abdominal pain since lap cholecystectomy. CTAP revealed gallstones in the gallbladder remnant and distal CBD. GI performed and ERCP on friday and removed multiple stones from the CBD. The pt feels better and is tolerating a regular diet.    PMH/PSH/MEDS/ALL/SH/FH/ROS:  Unchanged from H&P above  Vitals/PE/Labs/Radiographic data:  Reviewed     A/P  Neuro:  abdominal pain, improved  	Continue pain control    CVS:	CAD/HTN/history of MI  	Continue Home meds  	Continue to monitor vitals    Pulm:  	Atelectasis                Continue ISP    GI:	Choledocholithiasis, s/p ERCP  	Biliary stricture  	Tumor markers sent  	Dr. Hernandez consulted for completion cholecystectomy    :  	CKD 2  	Monitor and replace lytes  	Monitor I’s and O’s    Heme:   No active issues  	Monitor H/h    ID: 	Biliary infection  	Leukocytosis  	Continue ABX    Proph:	continue DVT proph with lovenox .
Doing well post ERCP  Some constipation - would consider MIralax PRN and increasing diet  Needs clarification for surgical plan for GB remnant
Pt seen and examined, agree with above. Pt feeling well, tolerating diet, no abdominal pain, no nausea, no fevers or chills. LFTs improving after ERCP with sphincterotomy. I discussed with patient and his family need to follow up with  for possible completion cholecystectomy.
Patient seen and examined on AM rounds. Chart reviewed. Resident note confirmed. Pt is an 84 year old male with a medical history significant for CAD s/p MI (40+yrs ago), CABG, pacemaker, thyroid cancer (s/p thyroidectomy), breast cancer (s/p L mastectomy), prostate cancer, and gallstone pancreatitis (s/p subtotal lap cholecystectomy, March 2019, Dr. CHIQUI Mkceon), who returned to [facility] with persistent epigastric abdominal pain since lap cholecystectomy. CTAP revealed gallstones in the gallbladder remnant and distal CBD. GI was consulted for ERCP. The pt remains NPO.  PMH/PSH/MEDS/ALL/SH/FH/ROS:  Unchanged from H&P above  Vitals/PE/Labs/Radiographic data:  Reviewed     A/P  Neuro:  abdominal pain   	Continue pain control    CVS:	CAD/HTN/history of MI  	Continue Home meds  	Continue to monitor vitals    Pulm:  	Atelectasis  Continue ISP    GI:	Choledocholithiasis  	Biliary stricture  	Tumor markers sent  	Dr. Hernandez consulted for completion cholecystectomy    :  	CKD 2  	Monitor and replace lytes  	Monitor I’s and O’s    Heme:   No active issues  	Monitor H/h    ID: 	Biliary infection  	Leukocytosis  	Continue ABX    Proph:	start DVT proph with lovenox
doing well  complains of diarrhea  to check C diff  DC Zosyn

## 2019-09-02 NOTE — PROGRESS NOTE ADULT - ASSESSMENT
A/P: 85 y/o M h/o of subtotal laparoscopic cholecystectomy - admitted with gallstones of the gallbladder remnant and choledocholithiasis, now s/p ERCP with CBD extraction on 8/30, sphincterotomy and duodenal polyp removal. 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 Hepatobiliary surgeon (Dr. Art Perdue) consulted for future completion cholecystectomy, or alternate operative planning (depending on pathology of duodenal polyp and CA 19-9 trend).     - c/w low fat diet   - C.diff not tested, stool was formed     - OOB/ISS   - PT: home with no skilled needs   - Will f/u with Dr. Hernandez outpatient   - Will f/u with GI outpatient   - plan for discharge     ATP  [id number]

## 2019-09-03 LAB — SURGICAL PATHOLOGY STUDY: SIGNIFICANT CHANGE UP

## 2019-09-03 NOTE — ED PROVIDER NOTE - ATTESTATION, MLM
URGENT CARE NOTE    Chief Complaint   Patient presents with   • Ear Problem     bilateral ear pain since Sunday       HPI: Beverly Gallo is a 22 year old female who comes in today complaining of  Bilateral ear pain for the last couple of days, she statesshe's had a cold last week that did beak towards in the last week. Over the last 3 day she's had increasing pressure in her ears left worse than right. Mild congestion of the sinuses as well.  She states she is chronically bed years and they are chronically getting infected.  This is not as bad as a normally get befor she comes in. Denies any drainage from the ears denies any fevers or chills denies any cough noted with this.    Current Outpatient Medications   Medication Sig Dispense Refill   • phenazopyridine (PYRIDIUM) 200 MG tablet Take 1 tablet by mouth 3 times daily as needed for Pain. 10 tablet 0   • ondansetron (ZOFRAN ODT) 4 MG disintegrating tablet Place 1 tablet onto the tongue every 8 hours as needed for Nausea. 10 tablet 0   • citalopram (CELEXA) 20 MG tablet Take 0.5 tablets by mouth daily. Dose decrease 1/17/18 30 tablet 0   • buPROPion (WELLBUTRIN XL) 150 MG 24 hr tablet Take 1 tablet by mouth daily. 30 tablet 3   • albuterol 108 (90 Base) MCG/ACT inhaler Inhale 2 puffs into the lungs every 4 hours as needed for Shortness of Breath or Wheezing. 1 Inhaler 1   • Prenatal-FeCbn-FeAspGl-FA-Omeg (ULTIMATECARE ONE) 27-1 MG capsule Take 1 capsule by mouth daily. 100 capsule 3   • loratadine (CLARITIN) 10 MG tablet Take 10 mg by mouth daily.     • triamcinolone (ARISTOCORT) 0.1 % cream Apply tid prn rash 120 g 1     No current facility-administered medications for this visit.      ALLERGIES:   Allergen Reactions   • Tramadol Hcl Other (See Comments)     \"goofy\"     • Icy Hot RASH   • Vicks RASH     Past Medical History:   Diagnosis Date   • ADHD (attention deficit hyperactivity disorder)    • Allergy     • Asthma    • Bipolar depression (CMS/[facility])    • Eczema    • Esophageal reflux    • Nexplanon insertion     Expires 1-18, Lot [id number] NDC 7952-9079-56   • Pilonidal sinus      Family History   Problem Relation Age of Onset   • Asthma Mother    • Cancer Mother         cervical   • Psychiatry Mother         bipolar   • Psychiatry Father         bipolar   • Heart disease Maternal Grandfather    • Cancer, Colon Paternal Grandfather      Social History     Socioeconomic History   • Marital status: /Civil Union     Spouse name: Not on file   • Number of children: Not on file   • Years of education: Not on file   • Highest education level: Not on file   Occupational History   • Occupation: [employer]     Comment: part time   Social Needs   • Financial resource strain: Not on file   • Food insecurity:     Worry: Not on file     Inability: Not on file   • Transportation needs:     Medical: Not on file     Non-medical: Not on file   Tobacco Use   • Smoking status: Current Every Day Smoker     Packs/day: 0.50     Types: Cigarettes     Start date: 11/15/2014   • Smokeless tobacco: Never Used   • Tobacco comment: pt is trying to quit / pt got information recently   Substance and Sexual Activity   • Alcohol use: No     Alcohol/week: 0.0 standard drinks   • Drug use: No   • Sexual activity: Yes     Partners: Male     Birth control/protection: Implant, None     Comment: she and  trying to conceive.    Lifestyle   • Physical activity:     Days per week: Not on file     Minutes per session: Not on file   • Stress: Not on file   Relationships   • Social connections:     Talks on phone: Not on file     Gets together: Not on file     Attends [employer] service: Not on file     Active member of club or organization: Not on file     Attends meetings of clubs or organizations: Not on file     Relationship status: Not on file   • Intimate partner violence:     Fear of current or ex partner: Not on file      Emotionally abused: Not on file     Physically abused: Not on file     Forced sexual activity: Not on file   Other Topics Concern   • Not on file   Social History Narrative   • Not on file     Social History     Tobacco Use   Smoking Status Current Every Day Smoker   • Packs/day: 0.50   • Types: Cigarettes   • Start date: 11/15/2014   Smokeless Tobacco Never Used   Tobacco Comment    pt is trying to quit / pt got information recently       ROS:   Pertinent positives as noted in HPI.    PHYSICAL EXAMINATION:  Visit Vitals  /86   Pulse 90   Temp 97.9 °F (36.6 °C) (Temporal)   Resp 16   Ht 5' (1.524 m)   Wt 99.3 kg   LMP 02/05/2019 (Exact Date)   SpO2 99%   BMI 42.78 kg/m²       Constitutional:  Well developed, well nourished, no acute distress, non-toxic appearance   Eyes:  PERRL, conjunctiva normal   HENT:  Atraumatic, normocephalic, external ears normal, tympanic membranes pearly bilaterally, external nose is normal, oropharynx moist, no pharyngeal exudates. Neck- normal range of motion, no tenderness, supple   Respiratory:  No respiratory distress, normal breath sounds, no rales, no wheezing   Cardiovascular:  Normal rate, normal rhythm, no murmurs, no gallops, no rubs   Integument:  Well  hydrated, warm and dry, no skin lesions or rash noted  Lymphatic:  No cervical or clavicular lymphadenopathy noted   Neurologic:  Alert & oriented x 3, CN 2-12 grossly normal, normal motor function, normal sensory function, no focal deficits noted   Psychiatric:  Speech and behavior appropriate       LABS:  No results found for this visit on 09/03/19.      RADIOLOGY:          ASSESSMENT:  1. Acute otalgia, bilateral           PLAN:  No orders of the defined types were placed in this encounter.         bilateral otalgia no evidence of otitis media with this. Recommend decongestants and antihistamines as well as Flonase or 90 pot. If worsening symptoms she should be reevaluated n urgent care ER or follow up wit [facility]  provider if not improving over the next week.        Patient voices understanding and agreement with treatment plan. No questions at this time all questions were answered during the course of visit. Follow-up primary care as needed.  The patient understands that this is a provisional diagnosis and that the findings from today are a \"picture in time\" of their disease process. If the patient has questions at any time about their diagnosis, disease process, progression, or lack of therapeutic response, they are encouraged to call their primary care provider or the emergency department for further direction. New or changing symptoms often require prompt followup and re-evaluation.    No follow-ups on file.     I have reviewed and confirmed nurses' notes for patient's medications, allergies, medical history, and surgical history.

## 2019-09-10 ENCOUNTER — APPOINTMENT (OUTPATIENT)
Dept: SURGICAL ONCOLOGY | Facility: CLINIC | Age: 84
End: 2019-09-10
Payer: MEDICARE

## 2019-09-10 VITALS
RESPIRATION RATE: 16 BRPM | DIASTOLIC BLOOD PRESSURE: 96 MMHG | BODY MASS INDEX: 22.5 KG/M2 | HEART RATE: 60 BPM | SYSTOLIC BLOOD PRESSURE: 143 MMHG | HEIGHT: 66 IN | WEIGHT: 140 LBS | TEMPERATURE: 97.9 F

## 2019-09-10 PROCEDURE — 99214 OFFICE O/P EST MOD 30 MIN: CPT

## 2019-09-10 PROCEDURE — 99205 OFFICE O/P NEW HI 60 MIN: CPT

## 2019-09-13 ENCOUNTER — APPOINTMENT (OUTPATIENT)
Dept: CARDIOLOGY | Facility: CLINIC | Age: 84
End: 2019-09-13
Payer: MEDICARE

## 2019-09-13 ENCOUNTER — NON-APPOINTMENT (OUTPATIENT)
Age: 84
End: 2019-09-13

## 2019-09-13 VITALS
DIASTOLIC BLOOD PRESSURE: 77 MMHG | RESPIRATION RATE: 17 BRPM | SYSTOLIC BLOOD PRESSURE: 111 MMHG | BODY MASS INDEX: 23.24 KG/M2 | OXYGEN SATURATION: 96 % | WEIGHT: 144 LBS | TEMPERATURE: 97.5 F

## 2019-09-13 PROCEDURE — 99214 OFFICE O/P EST MOD 30 MIN: CPT

## 2019-09-13 PROCEDURE — 93000 ELECTROCARDIOGRAM COMPLETE: CPT | Mod: 59

## 2019-09-13 PROCEDURE — 93306 TTE W/DOPPLER COMPLETE: CPT

## 2019-09-13 NOTE — REVIEW OF SYSTEMS
[Negative] : Heme/Lymph [Fever] : no fever [Headache] : no headache [Chills] : no chills [Feeling Fatigued] : not feeling fatigued [Shortness Of Breath] : no shortness of breath [Dyspnea on exertion] : not dyspnea during exertion [Chest  Pressure] : no chest pressure [Chest Pain] : no chest pain [Lower Ext Edema] : no extremity edema [Leg Claudication] : no intermittent leg claudication [Cough] : no cough [Palpitations] : no palpitations [Abdominal Pain] : no abdominal pain [Vomiting] : no vomiting [Heartburn] : no heartburn [Change In The Stool] : no change in stool [Dysphagia] : no dysphagia [Joint Swelling] : no joint swelling [Joint Pain] : no joint pain [Muscle Cramps] : no muscle cramps [Skin: A Rash] : no rash: [Limb Weakness (Paresis)] : no limb weakness [Itching] : no itching [Skin Lesions] : no skin lesions [Dizziness] : no dizziness [Numbness (Hypesthesia)] : no numbness [Tremor] : no tremor was seen [Convulsions] : no convulsions [Tingling (Paresthesia)] : no tingling [Memory Lapses Or Loss] : no memory lapses or loss [Excessive Thirst] : no polydipsia [Easy Bruising] : no tendency for easy bruising [Easy Bleeding] : no tendency for easy bleeding [FreeTextEntry1] : gall bladder problem.

## 2019-09-13 NOTE — DISCUSSION/SUMMARY
[Coronary Artery Disease] : coronary artery disease [Pacemaker Function Normal] : normal pacemaker function [Hyperlipidemia] : hyperlipidemia [Known CAD] : known coronary artery disease [Diet Modification] : diet modification [Hypertension] : hypertension [Responding to Treatment] : responding to treatment [Improving] : improving [Stable] : stable [Patient] : the patient [None] : none [___ Month(s)] : [unfilled] month(s) [Minutes spent___] : for [unfilled] ~Uminutes [With Me] : with me [de-identified] : reactive hypertension? [de-identified] : he lost weight, now encourage to have  salt liberation. [de-identified] : without medications. [de-identified] : high degree AV block, s/p  PPM. [FreeTextEntry1] : cardiac murmur discussed. echocardiogram is indicated. result discussed. Repeating echo before the planned  surgery.\par The  cholesterol, lipid data is checked by his primary  doctor.\par Pre-operative assessment:\par 1.No active cardiac risk contradicting to the surgery.\par 2. RCRI is low.\par 3. Repeating echo, result pending.\par \par

## 2019-09-13 NOTE — REASON FOR VISIT
[Follow-Up - Clinic] : a clinic follow-up of [Coronary Artery Disease] : coronary artery disease [Hyperlipidemia] : hyperlipidemia [FreeTextEntry2] : pre-op [Hypertension] : hypertension

## 2019-09-13 NOTE — HISTORY OF PRESENT ILLNESS
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 He then was  placed on PPM on 6-6-2014 with Metronic ADD pacing at [facility].\par Since the PPM, he is doing well without event of fall or syncope.\par In the end of March 2019, he had episode of  abdominal pain  with the end result of "gall bladder full of stone". He had clean ERCP and lap john done with residual gall bladder  attaching to the intestine in [facility]. He had "tough surgery", but now he is "ok with low BP even off the antihypertensives"\par He has no more dizziness. He is free of  symptoms of chest pain, shortness of breath, dizziness or palpitations. But, when his BP is low, he feels  lightheadedness. He is off the  medication/losartan.\par He is again for the surgery of the remaining  gall bladder because of the stone formation.

## 2019-09-13 NOTE — PHYSICAL EXAM
[General Appearance - Well Developed] : well developed [Normal Appearance] : normal appearance [Well Groomed] : well groomed [General Appearance - Well Nourished] : well nourished [No Deformities] : no deformities [Normal Conjunctiva] : the conjunctiva exhibited no abnormalities [General Appearance - In No Acute Distress] : no acute distress [Eyelids - No Xanthelasma] : the eyelids demonstrated no xanthelasmas [Normal Oral Mucosa] : normal oral mucosa [No Oral Cyanosis] : no oral cyanosis [No Oral Pallor] : no oral pallor [Normal Jugular Venous A Waves Present] : normal jugular venous A waves present [Normal Jugular Venous V Waves Present] : normal jugular venous V waves present [No Jugular Venous Ortiz A Waves] : no jugular venous ortiz A waves [Exaggerated Use Of Accessory Muscles For Inspiration] : no accessory muscle use [Respiration, Rhythm And Depth] : normal respiratory rhythm and effort [Auscultation Breath Sounds / Voice Sounds] : lungs were clear to auscultation bilaterally [Chest Palpation] : palpation of the chest revealed no abnormalities [Lungs Percussion] : the lungs were normal to percussion [Heart Sounds] : normal S1 and S2 [Heart Rate And Rhythm] : heart rate and rhythm were normal [Arterial Pulses Normal] : the arterial pulses were normal [Edema] : no peripheral edema present [Veins - Varicosity Changes] : no varicosital changes were noted in the lower extremities [Systolic grade ___/6] : A grade [unfilled]/6 systolic murmur was heard. [Bowel Sounds] : normal bowel sounds [Abdomen Soft] : soft [Abdomen Tenderness] : non-tender [Abdomen Mass (___ Cm)] : no abdominal mass palpated [Abnormal Walk] : normal gait [Abdomen Hernia] : no hernia was discovered [Nail Clubbing] : no clubbing of the fingernails [Gait - Sufficient For Exercise Testing] : the gait was sufficient for exercise testing [Petechial Hemorrhages (___cm)] : no petechial hemorrhages [Cyanosis, Localized] : no localized cyanosis [] : no ischemic changes [Skin Color & Pigmentation] : normal skin color and pigmentation [No Venous Stasis] : no venous stasis [Skin Lesions] : no skin lesions [No Skin Ulcers] : no skin ulcer [No Xanthoma] : no  xanthoma was observed [Affect] : the affect was normal [Oriented To Time, Place, And Person] : oriented to person, place, and time [Mood] : the mood was normal [No Anxiety] : not feeling anxious [Skin Turgor] : normal skin turgor [FreeTextEntry1] : s/p lap cholecystectomy [Impaired Insight] : insight and judgment were intact

## 2019-09-19 PROCEDURE — 99285 EMERGENCY DEPT VISIT HI MDM: CPT | Mod: 25

## 2019-09-19 PROCEDURE — 97161 PT EVAL LOW COMPLEX 20 MIN: CPT

## 2019-09-19 PROCEDURE — 74177 CT ABD & PELVIS W/CONTRAST: CPT

## 2019-09-19 PROCEDURE — C1769: CPT

## 2019-09-19 PROCEDURE — 83735 ASSAY OF MAGNESIUM: CPT

## 2019-09-19 PROCEDURE — 71045 X-RAY EXAM CHEST 1 VIEW: CPT

## 2019-09-19 PROCEDURE — 96374 THER/PROPH/DIAG INJ IV PUSH: CPT | Mod: XU

## 2019-09-19 PROCEDURE — 86301 IMMUNOASSAY TUMOR CA 19-9: CPT

## 2019-09-19 PROCEDURE — 85610 PROTHROMBIN TIME: CPT

## 2019-09-19 PROCEDURE — 86304 IMMUNOASSAY TUMOR CA 125: CPT

## 2019-09-19 PROCEDURE — 88305 TISSUE EXAM BY PATHOLOGIST: CPT

## 2019-09-19 PROCEDURE — 93005 ELECTROCARDIOGRAM TRACING: CPT

## 2019-09-19 PROCEDURE — 83690 ASSAY OF LIPASE: CPT

## 2019-09-19 PROCEDURE — 81001 URINALYSIS AUTO W/SCOPE: CPT

## 2019-09-19 PROCEDURE — 84100 ASSAY OF PHOSPHORUS: CPT

## 2019-09-19 PROCEDURE — 82378 CARCINOEMBRYONIC ANTIGEN: CPT

## 2019-09-19 PROCEDURE — 80053 COMPREHEN METABOLIC PANEL: CPT

## 2019-09-19 PROCEDURE — 85027 COMPLETE CBC AUTOMATED: CPT

## 2019-09-19 PROCEDURE — 96375 TX/PRO/DX INJ NEW DRUG ADDON: CPT | Mod: XU

## 2019-10-15 ENCOUNTER — OUTPATIENT (OUTPATIENT)
Dept: OUTPATIENT SERVICES | Facility: HOSPITAL | Age: 84
LOS: 1 days | End: 2019-10-15
Payer: MEDICARE

## 2019-10-15 VITALS
TEMPERATURE: 98 F | WEIGHT: 110.01 LBS | RESPIRATION RATE: 16 BRPM | DIASTOLIC BLOOD PRESSURE: 78 MMHG | SYSTOLIC BLOOD PRESSURE: 120 MMHG | OXYGEN SATURATION: 95 % | HEIGHT: 63 IN | HEART RATE: 71 BPM

## 2019-10-15 DIAGNOSIS — K80.50 CALCULUS OF BILE DUCT WITHOUT CHOLANGITIS OR CHOLECYSTITIS WITHOUT OBSTRUCTION: ICD-10-CM

## 2019-10-15 DIAGNOSIS — Z98.890 OTHER SPECIFIED POSTPROCEDURAL STATES: Chronic | ICD-10-CM

## 2019-10-15 DIAGNOSIS — Z90.49 ACQUIRED ABSENCE OF OTHER SPECIFIED PARTS OF DIGESTIVE TRACT: Chronic | ICD-10-CM

## 2019-10-15 LAB
ALBUMIN SERPL ELPH-MCNC: 4.1 G/DL — SIGNIFICANT CHANGE UP (ref 3.3–5)
ALP SERPL-CCNC: 70 U/L — SIGNIFICANT CHANGE UP (ref 40–120)
ALT FLD-CCNC: 11 U/L — SIGNIFICANT CHANGE UP (ref 4–41)
ANION GAP SERPL CALC-SCNC: 14 MMO/L — SIGNIFICANT CHANGE UP (ref 7–14)
AST SERPL-CCNC: 20 U/L — SIGNIFICANT CHANGE UP (ref 4–40)
BILIRUB SERPL-MCNC: 0.3 MG/DL — SIGNIFICANT CHANGE UP (ref 0.2–1.2)
BLD GP AB SCN SERPL QL: NEGATIVE — SIGNIFICANT CHANGE UP
BUN SERPL-MCNC: 27 MG/DL — HIGH (ref 7–23)
CALCIUM SERPL-MCNC: 9.9 MG/DL — SIGNIFICANT CHANGE UP (ref 8.4–10.5)
CHLORIDE SERPL-SCNC: 100 MMOL/L — SIGNIFICANT CHANGE UP (ref 98–107)
CO2 SERPL-SCNC: 27 MMOL/L — SIGNIFICANT CHANGE UP (ref 22–31)
CREAT SERPL-MCNC: 1.04 MG/DL — SIGNIFICANT CHANGE UP (ref 0.5–1.3)
GLUCOSE SERPL-MCNC: 82 MG/DL — SIGNIFICANT CHANGE UP (ref 70–99)
HCT VFR BLD CALC: 32.7 % — LOW (ref 39–50)
HGB BLD-MCNC: 10.2 G/DL — LOW (ref 13–17)
MCHC RBC-ENTMCNC: 31.2 % — LOW (ref 32–36)
MCHC RBC-ENTMCNC: 31.3 PG — SIGNIFICANT CHANGE UP (ref 27–34)
MCV RBC AUTO: 100.3 FL — HIGH (ref 80–100)
NRBC # FLD: 0 K/UL — SIGNIFICANT CHANGE UP (ref 0–0)
PLATELET # BLD AUTO: 171 K/UL — SIGNIFICANT CHANGE UP (ref 150–400)
PMV BLD: 10.1 FL — SIGNIFICANT CHANGE UP (ref 7–13)
POTASSIUM SERPL-MCNC: 3.9 MMOL/L — SIGNIFICANT CHANGE UP (ref 3.5–5.3)
POTASSIUM SERPL-SCNC: 3.9 MMOL/L — SIGNIFICANT CHANGE UP (ref 3.5–5.3)
PROT SERPL-MCNC: 7.5 G/DL — SIGNIFICANT CHANGE UP (ref 6–8.3)
RBC # BLD: 3.26 M/UL — LOW (ref 4.2–5.8)
RBC # FLD: 14.8 % — HIGH (ref 10.3–14.5)
RH IG SCN BLD-IMP: NEGATIVE — SIGNIFICANT CHANGE UP
SODIUM SERPL-SCNC: 141 MMOL/L — SIGNIFICANT CHANGE UP (ref 135–145)
WBC # BLD: 7.36 K/UL — SIGNIFICANT CHANGE UP (ref 3.8–10.5)
WBC # FLD AUTO: 7.36 K/UL — SIGNIFICANT CHANGE UP (ref 3.8–10.5)

## 2019-10-15 PROCEDURE — 93010 ELECTROCARDIOGRAM REPORT: CPT

## 2019-10-15 RX ORDER — MULTIVIT-MIN/FERROUS GLUCONATE 9 MG/15 ML
1 LIQUID (ML) ORAL
Qty: 0 | Refills: 0 | DISCHARGE

## 2019-10-15 RX ORDER — TAMSULOSIN HYDROCHLORIDE 0.4 MG/1
1 CAPSULE ORAL
Qty: 0 | Refills: 0 | DISCHARGE

## 2019-10-15 RX ORDER — SODIUM CHLORIDE 9 MG/ML
1000 INJECTION, SOLUTION INTRAVENOUS
Refills: 0 | Status: DISCONTINUED | OUTPATIENT
Start: 2019-01-01 | End: 2019-01-01

## 2019-10-15 RX ORDER — LOSARTAN POTASSIUM 100 MG/1
1 TABLET, FILM COATED ORAL
Qty: 0 | Refills: 0 | DISCHARGE

## 2019-10-15 RX ORDER — OMEGA-3 ACID ETHYL ESTERS 1 G
1 CAPSULE ORAL
Qty: 0 | Refills: 0 | DISCHARGE

## 2019-10-15 NOTE — H&P PST ADULT - RS GEN PE MLT RESP DETAILS PC
respirations non-labored/no chest wall tenderness/no intercostal retractions/airway patent/breath sounds equal/no rales/no wheezes/no subcutaneous emphysema/clear to auscultation bilaterally/good air movement/no rhonchi

## 2019-10-15 NOTE — H&P PST ADULT - NSICDXPASTSURGICALHX_GEN_ALL_CORE_FT
PAST SURGICAL HISTORY:  H/O nasal septoplasty     S/P CABG x 2     S/P cholecystectomy March 2019, Dr Mckeon/[facility]    S/P left mastectomy     S/P thyroidectomy

## 2019-10-15 NOTE — H&P PST ADULT - NEGATIVE GASTROINTESTINAL SYMPTOMS
no vomiting/no nausea/no hematochezia/no hiccoughs/no steatorrhea/no flatulence/no change in bowel habits/no melena/no jaundice

## 2019-10-15 NOTE — H&P PST ADULT - NEGATIVE GENERAL GENITOURINARY SYMPTOMS
no dysuria/no hematuria/no flank pain L/no renal colic/no flank pain R/no bladder infections/no urine discoloration/no incontinence

## 2019-10-15 NOTE — H&P PST ADULT - PRIMARY CARE PROVIDER
Problem: Communication  Goal: The ability to communicate needs accurately and effectively will improve  Discussed plan of care with pt and pt's mother at the bedside. Transfer orders in place for medical floor in place, discussed expectations. Oriented pt to unit policy and medical floor policy. Education provided regarding medication regimen and chest tube management.     Problem: Safety  Goal: Will remain free from injury  Safety and fall precautions in place with mobility. Pt return demonstrates use of call light for assistance. Bed alarm in place when in bed.     Problem: Infection  Goal: Will remain free from infection  Infection prevention measures in place via standard precautions with proper hand hygiene. Educated pt and family members on importance of hand hygiene.     Problem: Pain Management  Goal: Pain level will decrease to patient’s comfort goal  Multimodal approach to pain via pain meds per MAR and comfort measures. Discussed side effects of pain medication use.          Dr. Day [id number]

## 2019-10-15 NOTE — H&P PST ADULT - NEGATIVE ALLERGY TYPES
no outdoor environmental allergies/no indoor environmental allergies/no reactions to insect bites/no reactions to animals/no reactions to food

## 2019-10-15 NOTE — H&P PST ADULT - ASSESSMENT
DX: calculus of bile duct without cholangitis or cholecystitis without obstruction and evaluated for a scheduled robotic poss. open completion cholecystectomy poss. common bile duct resection, biliary bypass on 10/28/19.

## 2019-10-15 NOTE — H&P PST ADULT - NEGATIVE NEUROLOGICAL SYMPTOMS
no headache/no facial palsy/no vertigo/no loss of sensation/no generalized seizures/no focal seizures/no syncope/no loss of consciousness/no transient paralysis/no confusion

## 2019-10-15 NOTE — H&P PST ADULT - NEGATIVE CARDIOVASCULAR SYMPTOMS
no orthopnea/no peripheral edema/no claudication/no dyspnea on exertion/no palpitations/no paroxysmal nocturnal dyspnea/no chest pain

## 2019-10-15 NOTE — H&P PST ADULT - NSICDXPROBLEM_GEN_ALL_CORE_FT
PROBLEM DIAGNOSES  Problem: Calculus of bile duct  Assessment and Plan: PROBLEM DIAGNOSES  Problem: Calculus of bile duct  Assessment and Plan: preop instructions provided including NPO status, pepcid and hibiclense wash. Aware to c/w all pm am meds as ordered but stop any NSAIDs, OTC herbals, centrum, fish oil on 10/21/19. ASA to be clarified by cardiologist next wk on CC. Has both [facility] and [facility] scheduled for next wk, forms provided. PPM card copy in chart.

## 2019-10-15 NOTE — H&P PST ADULT - HISTORY OF PRESENT ILLNESS
84M w/ PMH of CAD s/p MI (40+yrs ago), CABG, pacemaker, thyroid cancer (s/p thyroidectomy), breast cancer (s/p L mastectomy) and prostate cancer, and gallstone pancreatitis (s/p subtotal lap cholecystectomy on March 2019 Dr. Mckeon,  EUS/ERCP w/ GI team, noted CBD narrowing at the level of pancreatic head w/o notable pancreatic head mass. Pt subsequently underwent a subtotal fenestrated lap cholecystectomy down to the infundibulum due to extensive intra-abdominal adhesions including duodenum plastered to the infundibulum. Lumen of the lower infundibulum was visualized, no additional stones found, and drain was placed under the R lobe of liver. States had persistent epigastric abdominal pain since lap cholecystectomy. Pt states that he's always had 4~8/10 abdominal pain since the surgery that never really went away, usually begins postprandial, and improves w/ time, no radiation or associated symptoms. Pain has prompted pt from PO intake, and pt endorses 12lb weight loss since the surgery. last 8/2019 pt became acutely worse, prompting the pt to present to [facility] ED where a CTAP obtained and showed gallstones in the remnant gallbladder and cystic duct. Pt now recommended surgical intervention to finalize removal of gallbladder 84M w/ PMH of CAD s/p MI (40+yrs ago), CABG, pacemaker, thyroid cancer (s/p thyroidectomy), breast cancer (s/p L mastectomy) and prostate cancer, and gallstone pancreatitis (s/p subtotal lap cholecystectomy on March 2019,  EUS/ERCP. States had persistent epigastric abdominal pain since lap cholecystectomy. Pt states that he's always had 4~8/10 abdominal pain since the surgery that never really went away, usually begins postprandial, and improves w/ time, no radiation or associated symptoms. Pain has prompted pt from PO intake, and pt endorses 12lb weight loss since the surgery. last 8/2019 pt became acutely worse, prompting the pt to present to [facility] ED where a CTAP obtained and showed gallstones in the remnant gallbladder and cystic duct. Pt now recommended surgical intervention at this time. 84M w/ PMH of CAD s/p MI (40+yrs ago), CABG, pacemaker, thyroid cancer (s/p thyroidectomy), breast cancer (s/p L mastectomy) and prostate cancer, and gallstone pancreatitis (s/p subtotal lap cholecystectomy on March 2019,  EUS/ERCP. States had persistent epigastric abdominal pain since lap cholecystectomy. Pt states that he's always had 4~8/10 abdominal pain since the surgery that never really went away, usually begins postprandial, and improves w/ time, no radiation or associated symptoms. Pain has prompted pt from PO intake, and pt endorses 12lb weight loss since the surgery. last 8/2019 pt became acutely worse, prompting the pt to present to [facility] ED where a CTAP obtained and showed gallstones in the remnant gallbladder and cystic duct. Pt now recommended surgical intervention at this time.   Preop dx: calculus of bile duct without cholangitis or cholecystitis without obstruction and evaluated for a scheduled robotic poss. open completion cholecystectomy poss. common bile duct resection, biliary bypass on 10/28/19.

## 2019-10-15 NOTE — H&P PST ADULT - NEGATIVE OPHTHALMOLOGIC SYMPTOMS
no pain L/no scleral injection L/no lacrimation R/no photophobia/no diplopia/no lacrimation L/no discharge L/no irritation L/no loss of vision R/no scleral injection R/Macular degeneration (on q 6 mo shoots)/no pain R/no irritation R/no loss of vision L/no blurred vision R/no discharge R

## 2019-10-15 NOTE — H&P PST ADULT - NSICDXPASTMEDICALHX_GEN_ALL_CORE_FT
PAST MEDICAL HISTORY:  Breast cancer     CAD (coronary artery disease)     Degeneration macular     History of chemotherapy 10 years ago (for breast ca)    HLD (hyperlipidemia)     HTN (hypertension)     Hypothyroidism     Melanoma of skin nose, lesion removed    Occasional tremors     Prostate cancer     S/P correction of deviated nasal septum     S/P radiation therapy for prostate ca 11 years ago  and thyroid ca  12 years ago    Thyroid cancer     Varicose vein

## 2019-10-15 NOTE — H&P PST ADULT - NEGATIVE ENMT SYMPTOMS
no dysphagia/no tinnitus/no vertigo/no sinus symptoms/no nasal discharge/no post-nasal discharge/no ear pain/no nasal obstruction/no hearing difficulty/no abnormal taste sensation/no gum bleeding/no dry mouth/no nasal congestion/no nose bleeds/no recurrent cold sores/no throat pain

## 2019-10-21 PROBLEM — H35.30 UNSPECIFIED MACULAR DEGENERATION: Chronic | Status: ACTIVE | Noted: 2019-10-15

## 2019-10-21 PROBLEM — C43.9 MALIGNANT MELANOMA OF SKIN, UNSPECIFIED: Chronic | Status: ACTIVE | Noted: 2019-10-15

## 2019-10-21 PROBLEM — Z98.890 OTHER SPECIFIED POSTPROCEDURAL STATES: Chronic | Status: ACTIVE | Noted: 2019-10-15

## 2019-10-21 PROBLEM — Z92.3 PERSONAL HISTORY OF IRRADIATION: Chronic | Status: ACTIVE | Noted: 2019-10-15

## 2019-10-21 PROBLEM — Z92.21 PERSONAL HISTORY OF ANTINEOPLASTIC CHEMOTHERAPY: Chronic | Status: ACTIVE | Noted: 2019-10-15

## 2019-10-21 PROBLEM — R25.1 TREMOR, UNSPECIFIED: Chronic | Status: ACTIVE | Noted: 2019-10-15

## 2019-10-21 NOTE — PHYSICAL EXAM
[Well Groomed] : well groomed [Normal Appearance] : normal appearance [General Appearance - Well Developed] : well developed [General Appearance - Well Nourished] : well nourished [General Appearance - In No Acute Distress] : no acute distress [No Deformities] : no deformities [Normal Conjunctiva] : the conjunctiva exhibited no abnormalities [Eyelids - No Xanthelasma] : the eyelids demonstrated no xanthelasmas [Normal Oral Mucosa] : normal oral mucosa [No Oral Pallor] : no oral pallor [No Oral Cyanosis] : no oral cyanosis [Normal Jugular Venous A Waves Present] : normal jugular venous A waves present [Normal Jugular Venous V Waves Present] : normal jugular venous V waves present [No Jugular Venous Ortiz A Waves] : no jugular venous ortiz A waves [Auscultation Breath Sounds / Voice Sounds] : lungs were clear to auscultation bilaterally [Exaggerated Use Of Accessory Muscles For Inspiration] : no accessory muscle use [Respiration, Rhythm And Depth] : normal respiratory rhythm and effort [Lungs Percussion] : the lungs were normal to percussion [Chest Palpation] : palpation of the chest revealed no abnormalities [Heart Sounds] : normal S1 and S2 [Heart Rate And Rhythm] : heart rate and rhythm were normal [Arterial Pulses Normal] : the arterial pulses were normal [Edema] : no peripheral edema present [Systolic grade ___/6] : A grade [unfilled]/6 systolic murmur was heard. [Veins - Varicosity Changes] : no varicosital changes were noted in the lower extremities [Bowel Sounds] : normal bowel sounds [Abdomen Soft] : soft [Abdomen Tenderness] : non-tender [Abdomen Mass (___ Cm)] : no abdominal mass palpated [Abdomen Hernia] : no hernia was discovered [Abnormal Walk] : normal gait [Nail Clubbing] : no clubbing of the fingernails [Gait - Sufficient For Exercise Testing] : the gait was sufficient for exercise testing [Cyanosis, Localized] : no localized cyanosis [Petechial Hemorrhages (___cm)] : no petechial hemorrhages [Skin Color & Pigmentation] : normal skin color and pigmentation [] : no rash [Skin Turgor] : normal skin turgor [No Venous Stasis] : no venous stasis [No Skin Ulcers] : no skin ulcer [Skin Lesions] : no skin lesions [Oriented To Time, Place, And Person] : oriented to person, place, and time [No Xanthoma] : no  xanthoma was observed [Impaired Insight] : insight and judgment were intact [Affect] : the affect was normal [Mood] : the mood was normal [No Anxiety] : not feeling anxious [FreeTextEntry1] : s/p lap cholecystectomy

## 2019-10-21 NOTE — REVIEW OF SYSTEMS
[Negative] : Heme/Lymph [Fever] : no fever [Headache] : no headache [Feeling Fatigued] : not feeling fatigued [Chills] : no chills [Shortness Of Breath] : no shortness of breath [Dyspnea on exertion] : not dyspnea during exertion [Chest Pain] : no chest pain [Chest  Pressure] : no chest pressure [Lower Ext Edema] : no extremity edema [Palpitations] : no palpitations [Leg Claudication] : no intermittent leg claudication [Abdominal Pain] : no abdominal pain [Cough] : no cough [Vomiting] : no vomiting [Heartburn] : no heartburn [Dysphagia] : no dysphagia [Change In The Stool] : no change in stool [Joint Pain] : no joint pain [Joint Swelling] : no joint swelling [Limb Weakness (Paresis)] : no limb weakness [Muscle Cramps] : no muscle cramps [Skin: A Rash] : no rash: [Itching] : no itching [Skin Lesions] : no skin lesions [Dizziness] : no dizziness [Tremor] : no tremor was seen [Numbness (Hypesthesia)] : no numbness [Convulsions] : no convulsions [Tingling (Paresthesia)] : no tingling [Memory Lapses Or Loss] : no memory lapses or loss [Excessive Thirst] : no polydipsia [Easy Bleeding] : no tendency for easy bleeding [Easy Bruising] : no tendency for easy bruising [FreeTextEntry1] : gall bladder problem.

## 2019-10-21 NOTE — HISTORY OF PRESENT ILLNESS
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 He then was  placed on PPM on 6-6-2014 with Metronic ADD pacing at [facility].\par Since the PPM, he is doing well without event of fall or syncope.\par In the end of March 2019, he had episode of  abdominal pain  with the end result of "gall bladder full of stone". He had clean ERCP and lap john done with residual gall bladder  attaching to the intestine in [facility]. He had "tough surgery", but now he is "ok with low BP even off the antihypertensives"\par He has no more dizziness. He is free of  symptoms of chest pain, shortness of breath, dizziness or palpitations. But, when his BP is low, he feels  lightheadedness. He is off the  medication/losartan.\par He is again for the surgery of the remaining  gall bladder because of the stone formation.

## 2019-10-21 NOTE — REASON FOR VISIT
[Coronary Artery Disease] : coronary artery disease [Follow-Up - Clinic] : a clinic follow-up of [Hyperlipidemia] : hyperlipidemia [FreeTextEntry2] : pre-operative assessment for the  Robotic abdominal surgery of gall bladder.  [Hypertension] : hypertension [FreeTextEntry1] : pre-op

## 2019-10-21 NOTE — DISCUSSION/SUMMARY
[Coronary Artery Disease] : coronary artery disease [Pacemaker Function Normal] : normal pacemaker function [Hyperlipidemia] : hyperlipidemia [Known CAD] : known coronary artery disease [Diet Modification] : diet modification [Hypertension] : hypertension [Improving] : improving [Responding to Treatment] : responding to treatment [Stable] : stable [Patient] : the patient [None] : none [___ Month(s)] : [unfilled] month(s) [Minutes spent___] : for [unfilled] ~Uminutes [With Me] : with me [de-identified] : he lost weight, now encourage to have  salt liberation. [de-identified] : without medications. [de-identified] : reactive hypertension? [de-identified] : high degree AV block, s/p  PPM. [FreeTextEntry1] : cardiac murmur discussed. echocardiogram  result discussed.  Aortic root dilatation discussed \par The  cholesterol, lipid data is checked by his primary  doctor.\par Pre-operative assessment:\par 1.No active cardiac risk contradicting to the surgery.\par 2. RCRI is low.\par \par \par

## 2019-10-28 NOTE — BRIEF OPERATIVE NOTE - OPERATION/FINDINGS
Robotic-assisted cholecystectomy of remnant gallbladder with drainage of liver abscess. Cystic duct and cystic artery occluded clipped with 10mm Endo-lock clips and transected. Upon entering abdomen, multiple gallstones identified upon hepatic surface and adherent to mesentery irrigated and aspirated. No spillage from remnant gallbladder.     Wound class III

## 2019-10-28 NOTE — CHART NOTE - NSCHARTNOTEFT_GEN_A_CORE
STATUS POST:  Robot-assisted cholecystectomy & drainage of liver abscess     POST OPERATIVE DAY #: 0    Vital Signs Last 24 Hrs  T(C): 36.6 (28 Oct 2019 22:48), Max: 36.8 (28 Oct 2019 20:30)  T(F): 97.8 (28 Oct 2019 22:48), Max: 98.2 (28 Oct 2019 20:30)  HR: 66 (28 Oct 2019 22:48) (60 - 88)  BP: 162/82 (28 Oct 2019 22:48) (107/90 - 162/82)  BP(mean): 91 (28 Oct 2019 21:00) (91 - 115)  RR: 16 (28 Oct 2019 22:48) (9 - 18)  SpO2: 97% (28 Oct 2019 22:48) (94% - 100%)      SUBJECTIVE: Pt seen     Pain: [ ] YES [ ] NO  Pain (0-10):              Pain Control Adequate: [ ] YES [ ] NO  SOB: [ ]YES [ ] NO  Chest Discomfort: [ ] YES [ ] NO    Nausea: [ ] YES [ ] NO           Vomiting: [ ] YES [ ] NO  Flatus: [ ] YES [ ] NO             Bowel Movement: [ ] YES [ ] NO     Void: [ ]YES [ ]No    Quintanilla:  NGT:  MARCELLUS:    General Appearance: Appears well, NAD  Neck: Supple  Chest: Equal expansion bilaterally, equal breath sounds  CV: Pulse regular presently  Abdomen: Soft, nontense, appropriate incisional tenderness, dressings clean and dry and intact  Extremities: Grossly symmetric, SCD's in place     I&O's Summary    28 Oct 2019 07:01  -  29 Oct 2019 00:49  --------------------------------------------------------  IN: 0 mL / OUT: 50 mL / NET: -50 mL      I&O's Detail    28 Oct 2019 07:01  -  29 Oct 2019 00:49  --------------------------------------------------------  IN:  Total IN: 0 mL    OUT:    Voided: 50 mL  Total OUT: 50 mL    Total NET: -50 mL          MEDICATIONS  (STANDING):  acetaminophen  IVPB .. 1000 milliGRAM(s) IV Intermittent every 6 hours  enoxaparin Injectable 40 milliGRAM(s) SubCutaneous daily  lactated ringers. 1000 milliLiter(s) (50 mL/Hr) IV Continuous <Continuous>  levothyroxine 150 MICROGram(s) Oral daily  simvastatin 10 milliGRAM(s) Oral at bedtime  tamsulosin 0.4 milliGRAM(s) Oral at bedtime    MEDICATIONS  (PRN):  oxyCODONE    IR 5 milliGRAM(s) Oral every 4 hours PRN Moderate Pain (4 - 6)  oxyCODONE    IR 10 milliGRAM(s) Oral every 4 hours PRN Severe Pain (7 - 10)      LABS:                        9.7    13.18 )-----------( 136      ( 28 Oct 2019 18:45 )             29.2     10-28    139  |  103  |  26<H>  ----------------------------<  102<H>  4.2   |  21<L>  |  1.16    Ca    9.1      28 Oct 2019 18:45  Phos  4.5     10-28  Mg     1.6     10-28    TPro  6.5  /  Alb  3.5  /  TBili  0.4  /  DBili  x   /  AST  258<H>  /  ALT  202<H>  /  AlkPhos  59  10-28          RADIOLOGY & ADDITIONAL STUDIES:

## 2019-10-28 NOTE — BRIEF OPERATIVE NOTE - NSICDXBRIEFPROCEDURE_GEN_ALL_CORE_FT
PROCEDURES:  Drainage of liver abscess 28-Oct-2019 17:24:57  Millie Roberts  Robot-assisted cholecystectomy 28-Oct-2019 17:24:48  Millie Roberts

## 2019-10-29 NOTE — PHYSICAL THERAPY INITIAL EVALUATION ADULT - PERTINENT HX OF CURRENT PROBLEM, REHAB EVAL
Asael Sandhu MD  Circulator: Cely Dumont RN  Scrub Person: Mariam Caballero  X-Ray Technologist: Jennifer Watkins  2nd Scrub: Arlene De La Cruz  Pre-Op Nurse: Le Cabrera RN  Post-Op Nurse: Cely De Patient is a 84 year old male admitted to [facility] on 10/28/19 with abdominal pain. PMH includes of CAD s/p MI (40+yrs ago), CABG, pacemaker, thyroid cancer (s/p thyroidectomy), breast cancer (s/p Left mastectomy) and prostate cancer, and gallstone pancreatitis (s/p subtotal laproscopic cholecystectomy on March 2019. Patient is now s/p Robot-assisted cholecystectomy.

## 2019-10-29 NOTE — DISCHARGE NOTE PROVIDER - HOSPITAL COURSE
Pt is an 83yo male w/ PMH of CAD s/p MI (40+yrs ago), CABG, pacemaker, thyroid cancer (s/p thyroidectomy), breast cancer (s/p L mastectomy) and prostate cancer, and gallstone pancreatitis , S/P subtotal lap cholecystectomy on March 2019 with EUS/ERCP. States had persistent epigastric abdominal pain since lap cholecystectomy. Pt states that he's always had 4~8/10 abdominal pain since the surgery that never really went away, usually begins postprandial, and improves w/ time, no radiation or associated symptoms. Pain has prompted pt from PO intake, and pt endorses 12lb weight loss since the surgery.     Per patient in August, he became acutely worse, prompting the pt to present to [facility] ED where a CT scan showed gallstones in the remnant gallbladder and cystic duct.         Pt admitted to [facility] 10/28 for surgical intervention.     He was taken to the OR on 10/28 and underwent a Robotic-assisted cholecystectomy of remnant gallbladder with drainage of liver abscess.         Post op course was stable. Pt was started on diet post op and advanced as tolerated.     Stable for discharge home on 10/29. Instructions given by team and will follow up with Dr. Perdue as out pt in 1-2 weeks.

## 2019-10-29 NOTE — PHYSICAL THERAPY INITIAL EVALUATION ADULT - ADDITIONAL COMMENTS
Patient states that he lives in a private home and has 5 steps with unilateral handrail on the left. Patient reports he was previously independent in all ADLs and stated using a cane for ambulation.     Patient was left sitting in the chair as found, all lines/tubes intact and call davis within reach, KARINA recinos.

## 2019-10-29 NOTE — PROGRESS NOTE ADULT - SUBJECTIVE AND OBJECTIVE BOX
General Surgery Progress Note  Patient is a 84y old  Male who presents with a chief complaint of " I am having a cholecystectomy" (15 Oct 2019 10:19)    INTERVAL EVENTS: Now POD 1 robotic assisted completion cholecystectomy and liver abscess drainage. Tolerated procedure well without complication.    SUBJECTIVE: Patient seen and examined at bedside with surgical team, patient c/o nonproductive cough. Denies fever, chills, CP, SOB, sputum, nausea, or vomiting. Abdominal pain well controlled on IV tylenol and PO oxy. Voiding. Tolerating clears. No acute events overnight.    OBJECTIVE:    Vital Signs Last 24 Hrs  T(C): 36.7 (29 Oct 2019 07:56), Max: 36.8 (28 Oct 2019 20:30)  T(F): 98 (29 Oct 2019 07:56), Max: 98.3 (29 Oct 2019 05:53)  HR: 60 (29 Oct 2019 07:56) (60 - 88)  BP: 136/73 (29 Oct 2019 07:56) (107/90 - 162/82)  BP(mean): 91 (28 Oct 2019 21:00) (91 - 115)  RR: 18 (29 Oct 2019 07:56) (9 - 18)  SpO2: 99% (29 Oct 2019 07:56) (94% - 100%)I&O's Detail    28 Oct 2019 07:01  -  29 Oct 2019 07:00  --------------------------------------------------------  IN:  Total IN: 0 mL    OUT:    Voided: 400 mL  Total OUT: 400 mL    Total NET: -400 mL      MEDICATIONS  (STANDING):  acetaminophen  IVPB .. 1000 milliGRAM(s) IV Intermittent every 6 hours  aspirin enteric coated 81 milliGRAM(s) Oral daily  enoxaparin Injectable 40 milliGRAM(s) SubCutaneous daily  levothyroxine 150 MICROGram(s) Oral daily  simvastatin 10 milliGRAM(s) Oral at bedtime  tamsulosin 0.4 milliGRAM(s) Oral at bedtime    MEDICATIONS  (PRN):  oxyCODONE    IR 5 milliGRAM(s) Oral every 4 hours PRN Moderate Pain (4 - 6)  oxyCODONE    IR 10 milliGRAM(s) Oral every 4 hours PRN Severe Pain (7 - 10)      PHYSICAL EXAM:  Constitutional: A&Ox3, NAD  Respiratory: Unlabored respirations.  Abdomen: Soft, nondistended, appropriately TTP near incisions. Lap sites x5 C/D/I  Extremities: WWP    LABS:                        9.2    11.68 )-----------( 146      ( 29 Oct 2019 05:32 )             29.8     10-29    136  |  101  |  25<H>  ----------------------------<  117<H>  3.8   |  23  |  1.12    Ca    9.0      29 Oct 2019 05:32  Phos  5.0     10-29  Mg     1.9     10-29    TPro  6.0  /  Alb  3.1<L>  /  TBili  0.3  /  DBili  x   /  AST  202<H>  /  ALT  166<H>  /  AlkPhos  54  10-29      LIVER FUNCTIONS - ( 29 Oct 2019 05:32 )  Alb: 3.1 g/dL / Pro: 6.0 g/dL / ALK PHOS: 54 u/L / ALT: 166 u/L / AST: 202 u/L / GGT: x             ABO Interpretation: A (10-28-19 @ 12:12)      ASSESSMENT:  84M w/ PMH of CAD s/p MI (40+yrs ago), CABG, pacemaker, thyroid cancer (s/p thyroidectomy), breast cancer (s/p L mastectomy) and prostate cancer, and gallstone pancreatitis s/p subtotal lap cholecystectomy in March 2019, c/b gallstones in CBD and GB remnant with cholecystitis of remnant, now POD 1 robotic assisted completion cholecystectomy and liver abscess drainage. Recovering well.    PLAN:  - Pain control PRN  - Patient provided with and educated on use of IS  - Diet: Regular  - DVT ppx: Lovenox  - PT evaluation, OOB    D Team Surgery  [id number]

## 2019-10-29 NOTE — DISCHARGE NOTE PROVIDER - NSDCFUADDINST_GEN_ALL_CORE_FT
may shower starting 10/30  Call MD for fever more than 101F, worsening pain, recurring nausea or vomiting.

## 2019-10-29 NOTE — DISCHARGE NOTE PROVIDER - CARE PROVIDER_API CALL
Art Perdue)  Surgery  [address] [place] [address]  Phone: [phone]  Fax: [phone]  Follow Up Time: 1 week

## 2019-10-29 NOTE — PHYSICAL THERAPY INITIAL EVALUATION ADULT - PATIENT PROFILE REVIEW, REHAB EVAL
PT orders received: Ambulate as Tolerated. Consult with RN Ashley JIMENEZ, pt may participate in PT evaluation./yes PT orders received: Ambulate as Tolerated. Consult with KARINA KAPOOR, pt may participate in PT evaluation./yes

## 2019-10-29 NOTE — DISCHARGE NOTE PROVIDER - NSDCCPCAREPLAN_GEN_ALL_CORE_FT
PRINCIPAL DISCHARGE DIAGNOSIS  Diagnosis: Chronic cholecystitis  Assessment and Plan of Treatment:       SECONDARY DISCHARGE DIAGNOSES  Diagnosis: Calculus of bile duct  Assessment and Plan of Treatment:

## 2019-10-29 NOTE — DISCHARGE NOTE PROVIDER - NSDCCPTREATMENT_GEN_ALL_CORE_FT
PRINCIPAL PROCEDURE  Procedure: Robot-assisted cholecystectomy  Findings and Treatment:       SECONDARY PROCEDURE  Procedure: Drainage of liver abscess  Findings and Treatment:

## 2019-10-30 NOTE — PROGRESS NOTE ADULT - SUBJECTIVE AND OBJECTIVE BOX
General Surgery Progress Note  Patient is a 84y old  Male who presents with a chief complaint of " I am having a cholecystectomy"     INTERVAL EVENTS: Now POD 2 robotic assisted completion cholecystectomy and liver abscess drainage. Tolerated procedure well without complication.    SUBJECTIVE: Patient seen and examined at bedside with surgical team, patient c/o nonproductive cough. Denies fever, chills, CP, SOB, sputum, nausea, or vomiting. Abdominal pain well controlled on IV tylenol and PO oxy. Voiding. Tolerating reg diet. No acute events overnight.    --------------------------------------------------------      Objective:  Vital Signs Last 24 Hrs  T(C): 36.8 (30 Oct 2019 05:58), Max: 37.2 (30 Oct 2019 00:04)  T(F): 98.2 (30 Oct 2019 05:58), Max: 98.9 (30 Oct 2019 00:04)  HR: 65 (30 Oct 2019 05:58) (57 - 68)  BP: 133/74 (30 Oct 2019 05:58) (118/69 - 133/74)  BP(mean): --  RR: 16 (30 Oct 2019 05:58) (16 - 18)  SpO2: 90% (30 Oct 2019 05:58) (90% - 99%)    I&O's Detail    29 Oct 2019 07:01  -  30 Oct 2019 07:00  --------------------------------------------------------  IN:  Total IN: 0 mL    OUT:    Bulb: 15 mL    Voided: 1800 mL  Total OUT: 1815 mL    Total NET: -1815 mL          MEDICATIONS  (STANDING):  acetaminophen   Tablet .. 650 milliGRAM(s) Oral every 6 hours  aspirin enteric coated 81 milliGRAM(s) Oral daily  enoxaparin Injectable 40 milliGRAM(s) SubCutaneous daily  levothyroxine 150 MICROGram(s) Oral daily  metoprolol succinate ER 12.5 milliGRAM(s) Oral two times a day  simvastatin 10 milliGRAM(s) Oral at bedtime  tamsulosin 0.4 milliGRAM(s) Oral at bedtime    MEDICATIONS  (PRN):  oxyCODONE    IR 5 milliGRAM(s) Oral every 4 hours PRN Moderate Pain (4 - 6)  oxyCODONE    IR 10 milliGRAM(s) Oral every 4 hours PRN Severe Pain (7 - 10)                            9.2    11.68 )-----------( 146      ( 29 Oct 2019 05:32 )             29.8       10-29    136  |  101  |  25<H>  ----------------------------<  117<H>  3.8   |  23  |  1.12    Ca    9.0      29 Oct 2019 05:32  Phos  5.0     10-29  Mg     1.9     10-29    TPro  6.0  /  Alb  3.1<L>  /  TBili  0.3  /  DBili  x   /  AST  202<H>  /  ALT  166<H>  /  AlkPhos  54  10-29        PHYSICAL EXAM:  Constitutional: A&Ox3, NAD  Respiratory: Unlabored respirations.  Abdomen: Soft, nondistended, appropriately TTP near incisions. Lap sites x5 C/D/I  Extremities: WWP

## 2019-10-30 NOTE — DISCHARGE NOTE NURSING/CASE MANAGEMENT/SOCIAL WORK - PATIENT PORTAL LINK FT
You can access the FollowMyHealth Patient Portal offered by [facility] by registering at the following website: [email or web address]. By joining [facility]’s FollowMyHealth portal, you will also be able to view your health information using other applications (apps) compatible with our system.

## 2019-10-30 NOTE — PROGRESS NOTE ADULT - ASSESSMENT
ASSESSMENT:  84M w/ PMH of CAD s/p MI (40+yrs ago), CABG, pacemaker, thyroid cancer (s/p thyroidectomy), breast cancer (s/p L mastectomy) and prostate cancer, and gallstone pancreatitis s/p subtotal lap cholecystectomy in March 2019, c/b gallstones in CBD and GB remnant with cholecystitis of remnant, now POD 2 robotic assisted completion cholecystectomy and liver abscess drainage. Recovering well.    PLAN:  - Pain control PRN  - Patient provided with and educated on use of IS  - Diet: Regular  - DVT ppx: Lovenox  - PT evaluation, OOB  - D/c home today    D Team Surgery  [id number]

## 2019-11-13 PROBLEM — K80.70 CHOLELITHIASIS WITH CHOLEDOCHOLITHIASIS: Status: ACTIVE | Noted: 2019-01-01

## 2019-11-13 NOTE — HISTORY OF PRESENT ILLNESS
[de-identified] : Mr. Maradaiga is an 83 y/o male who presented 03/2019 with acute gallstone pancreatitis.  EUS performed at that time did not demonstrate retained CBD stones and he underwent cholecystectomy 03/28/2019 - intraoperatively, gallbladder was severely inflamed and subtotal cholecystectomy was performed.  Patient did well until he recently developed similar symptoms.  He was admitted to [facility].  Workup was consistent with choledocholithiasis secondary to gallbladder remnant stones.\par He underwent ERCP/sphincterotomy with clearance of stone.  He is now referred to [facility] surgery to address gallbladder remnant.\par He presently feels well and denies abdominal pain, nausea/emesis.\par Blood work CEA/Ca 19-9 were transiently elevated during admission, but have returned to within normal limits.\par \par 11/13/2019:  Patient is s/p robotic completion cholecystectomy 10/28/2019.  He did well post-operatively.  He currently feels well and has minimal abdominal pain.  He denies fever, nausea/emesis.  Pathology show acute/chronic cholecystitis.

## 2019-11-13 NOTE — ASSESSMENT
[FreeTextEntry1] : Doing well.\par Diet and activity as tolerated.\par May follow up in office as clinically indicated.

## 2020-01-01 ENCOUNTER — APPOINTMENT (OUTPATIENT)
Dept: ELECTROPHYSIOLOGY | Facility: CLINIC | Age: 85
End: 2020-01-01
Payer: MEDICARE

## 2020-01-01 ENCOUNTER — NON-APPOINTMENT (OUTPATIENT)
Age: 85
End: 2020-01-01

## 2020-01-01 ENCOUNTER — RX RENEWAL (OUTPATIENT)
Age: 85
End: 2020-01-01

## 2020-01-01 ENCOUNTER — APPOINTMENT (OUTPATIENT)
Dept: CARDIOLOGY | Facility: CLINIC | Age: 85
End: 2020-01-01
Payer: MEDICARE

## 2020-01-01 VITALS
DIASTOLIC BLOOD PRESSURE: 81 MMHG | BODY MASS INDEX: 24.7 KG/M2 | SYSTOLIC BLOOD PRESSURE: 120 MMHG | TEMPERATURE: 97.3 F | WEIGHT: 153 LBS | OXYGEN SATURATION: 96 % | RESPIRATION RATE: 17 BRPM | HEART RATE: 79 BPM

## 2020-01-01 DIAGNOSIS — I25.10 ATHEROSCLEROTIC HEART DISEASE OF NATIVE CORONARY ARTERY W/OUT ANGINA PECTORIS: ICD-10-CM

## 2020-01-01 DIAGNOSIS — I10 ESSENTIAL (PRIMARY) HYPERTENSION: ICD-10-CM

## 2020-01-01 DIAGNOSIS — I77.810 THORACIC AORTIC ECTASIA: ICD-10-CM

## 2020-01-01 DIAGNOSIS — D13.2 BENIGN NEOPLASM OF DUODENUM: ICD-10-CM

## 2020-01-01 DIAGNOSIS — Z95.0 PRESENCE OF CARDIAC PACEMAKER: ICD-10-CM

## 2020-01-01 DIAGNOSIS — E78.00 PURE HYPERCHOLESTEROLEMIA, UNSPECIFIED: ICD-10-CM

## 2020-01-01 PROCEDURE — 93294 REM INTERROG EVL PM/LDLS PM: CPT

## 2020-01-01 PROCEDURE — 99214 OFFICE O/P EST MOD 30 MIN: CPT

## 2020-01-01 PROCEDURE — 93000 ELECTROCARDIOGRAM COMPLETE: CPT | Mod: 59

## 2020-01-01 PROCEDURE — 93296 REM INTERROG EVL PM/IDS: CPT

## 2020-01-01 RX ORDER — METOPROLOL SUCCINATE 25 MG/1
25 TABLET, EXTENDED RELEASE ORAL TWICE DAILY
Qty: 90 | Refills: 1 | Status: ACTIVE | COMMUNITY
Start: 2019-01-01 | End: 1900-01-01

## 2020-01-01 RX ORDER — LOSARTAN POTASSIUM 50 MG/1
50 TABLET, FILM COATED ORAL
Qty: 90 | Refills: 1 | Status: ACTIVE | COMMUNITY
Start: 2017-12-29 | End: 1900-01-01

## 2020-01-10 PROBLEM — D13.2 DUODENAL ADENOMA: Status: ACTIVE | Noted: 2020-01-01

## 2020-02-06 PROBLEM — I77.810 ASCENDING AORTA DILATATION: Status: ACTIVE | Noted: 2019-01-01

## 2020-02-06 NOTE — REASON FOR VISIT
[Follow-Up - Clinic] : a clinic follow-up of [Hyperlipidemia] : hyperlipidemia [Hypertension] : hypertension [Pacemaker Evaluation] : pacemaker ~T evaluation ~C was performed

## 2020-02-06 NOTE — DISCUSSION/SUMMARY
[Coronary Artery Disease] : coronary artery disease [Pacemaker Function Normal] : normal pacemaker function [Hyperlipidemia] : hyperlipidemia [Known CAD] : known coronary artery disease [Diet Modification] : diet modification [Hypertension] : hypertension [Responding to Treatment] : responding to treatment [Improving] : improving [Stable] : stable [None] : none [Patient] : the patient [___ Month(s)] : [unfilled] month(s) [Minutes spent___] : for [unfilled] ~Uminutes [With Me] : with me [de-identified] : reactive hypertension? [de-identified] : without medications. [de-identified] : he lost weight, now encourage to have  salt liberation. [de-identified] : high degree AV block, s/p  PPM. [FreeTextEntry1] : \par \par \par

## 2020-02-06 NOTE — PHYSICAL EXAM
[General Appearance - Well Developed] : well developed [Normal Appearance] : normal appearance [Well Groomed] : well groomed [General Appearance - Well Nourished] : well nourished [No Deformities] : no deformities [Normal Conjunctiva] : the conjunctiva exhibited no abnormalities [General Appearance - In No Acute Distress] : no acute distress [Eyelids - No Xanthelasma] : the eyelids demonstrated no xanthelasmas [Normal Oral Mucosa] : normal oral mucosa [No Oral Pallor] : no oral pallor [Normal Jugular Venous A Waves Present] : normal jugular venous A waves present [No Oral Cyanosis] : no oral cyanosis [No Jugular Venous Ortiz A Waves] : no jugular venous ortiz A waves [Normal Jugular Venous V Waves Present] : normal jugular venous V waves present [Exaggerated Use Of Accessory Muscles For Inspiration] : no accessory muscle use [Respiration, Rhythm And Depth] : normal respiratory rhythm and effort [Auscultation Breath Sounds / Voice Sounds] : lungs were clear to auscultation bilaterally [Chest Palpation] : palpation of the chest revealed no abnormalities [Heart Rate And Rhythm] : heart rate and rhythm were normal [Lungs Percussion] : the lungs were normal to percussion [Heart Sounds] : normal S1 and S2 [Arterial Pulses Normal] : the arterial pulses were normal [Edema] : no peripheral edema present [Systolic grade ___/6] : A grade [unfilled]/6 systolic murmur was heard. [Veins - Varicosity Changes] : no varicosital changes were noted in the lower extremities [Bowel Sounds] : normal bowel sounds [Abdomen Soft] : soft [Abdomen Tenderness] : non-tender [Abdomen Mass (___ Cm)] : no abdominal mass palpated [Abdomen Hernia] : no hernia was discovered [Abnormal Walk] : normal gait [Gait - Sufficient For Exercise Testing] : the gait was sufficient for exercise testing [Nail Clubbing] : no clubbing of the fingernails [Cyanosis, Localized] : no localized cyanosis [Petechial Hemorrhages (___cm)] : no petechial hemorrhages [Skin Color & Pigmentation] : normal skin color and pigmentation [Skin Turgor] : normal skin turgor [] : no rash [Skin Lesions] : no skin lesions [No Venous Stasis] : no venous stasis [No Xanthoma] : no  xanthoma was observed [No Skin Ulcers] : no skin ulcer [Oriented To Time, Place, And Person] : oriented to person, place, and time [Impaired Insight] : insight and judgment were intact [Mood] : the mood was normal [Affect] : the affect was normal [No Anxiety] : not feeling anxious [FreeTextEntry1] : s/p lap cholecystectomy

## 2020-02-06 NOTE — REVIEW OF SYSTEMS
[Negative] : Endocrine [Fever] : no fever [Headache] : no headache [Feeling Fatigued] : not feeling fatigued [Chills] : no chills [Dyspnea on exertion] : not dyspnea during exertion [Shortness Of Breath] : no shortness of breath [Chest  Pressure] : no chest pressure [Chest Pain] : no chest pain [Lower Ext Edema] : no extremity edema [Palpitations] : no palpitations [Cough] : no cough [Leg Claudication] : no intermittent leg claudication [Abdominal Pain] : no abdominal pain [Vomiting] : no vomiting [Heartburn] : no heartburn [Change In The Stool] : no change in stool [Dysphagia] : no dysphagia [Joint Pain] : no joint pain [Joint Swelling] : no joint swelling [Muscle Cramps] : no muscle cramps [Skin: A Rash] : no rash: [Limb Weakness (Paresis)] : no limb weakness [Itching] : no itching [Skin Lesions] : no skin lesions [Tremor] : no tremor was seen [Dizziness] : no dizziness [Numbness (Hypesthesia)] : no numbness [Convulsions] : no convulsions [Tingling (Paresthesia)] : no tingling [Excessive Thirst] : no polydipsia [Memory Lapses Or Loss] : no memory lapses or loss [Easy Bleeding] : no tendency for easy bleeding [Easy Bruising] : no tendency for easy bruising [FreeTextEntry1] : s/p gall bladder surgery with  robotic  surgery.

## 2020-02-06 NOTE — HISTORY OF PRESENT ILLNESS
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 He then was  placed on PPM on 6-6-2014 with Metronic ADD pacing at [facility].\par Since the PPM, he is doing well without event of fall or syncope.\par In the end of March 2019, he had episode of  abdominal pain  with the end result of "gall bladder full of stone". He had clean ERCP and lap john done with residual gall bladder  attaching to the intestine in [facility]. He had "tough surgery", but now he is "ok with low BP even off the antihypertensives"\par He has no more dizziness. He is free of  symptoms of chest pain, shortness of breath, dizziness or palpitations. But, when his BP is low, he feels  lightheadedness. He is off the  medication/losartan.\par

## 2020-10-26 ENCOUNTER — APPOINTMENT (OUTPATIENT)
Dept: CARDIOLOGY | Facility: CLINIC | Age: 85
End: 2020-10-26

## 2020-11-10 ENCOUNTER — APPOINTMENT (OUTPATIENT)
Dept: ELECTROPHYSIOLOGY | Facility: CLINIC | Age: 85
End: 2020-11-10

## 2021-06-21 NOTE — ED ADULT NURSE NOTE - CHIEF COMPLAINT QUOTE
abdominal pain, Nausea, no vomitting
54M with AF    - labs and EKG reviewed  - no absolute contraindications to KEN  - consent on chart

## 2021-08-24 NOTE — ED ADULT TRIAGE NOTE - NS ED TRIAGE AVPU SCALE
Alert-The patient is alert, awake and responds to voice. The patient is oriented to time, place, and person. The triage nurse is able to obtain subjective information.
Discarded in the usual manner

## 2021-10-19 NOTE — PHYSICAL THERAPY INITIAL EVALUATION ADULT - MUSCLE TONE ASSESSMENT, REHAB EVAL
Render Post-Care Instructions In Note?: yes Duration Of Freeze Thaw-Cycle (Seconds): 10-15 Add 52 Modifier (Optional): no Detail Level: Detailed Medical Necessity Information: It is in your best interest to select a reason for this procedure from the list below. All of these items fulfill various CMS LCD requirements except the new and changing color options. Medical Necessity Clause: This procedure was medically necessary because the lesions that were treated were: Consent: The patient's consent was obtained including but not limited to risks of crusting, scabbing, blistering, scarring, darker or lighter pigmentary change, recurrence, incomplete removal and infection. Post-Care Instructions: I reviewed with the patient in detail post-care instructions. Patient is to wear sunprotection, and avoid picking at any of the treated lesions. Pt may apply Vaseline to crusted or scabbing areas. Number Of Freeze-Thaw Cycles: 1 freeze-thaw cycle normal

## 2022-10-04 NOTE — ASU PATIENT PROFILE, ADULT - SURGERY TIME
PAC Line  Performed by: Cookie Daigle MD  Authorized by: Cookie Daigle MD     Patient Location:  OR  Monitoring:  Cardiac output monitoring   Patient Identified, Resuscitation equipment available, Procedure verified, Timeout Performed, IV checked, Surgical consent, Site Marked (if applicable), Monitors and Equipment Checked and Pre-evaluation  Prep:  Chlorhexidine gluconate (CHG)  Max Sterile Barrier Technique:  Hand Washing, Sterile gloves, Sterile drape, Sterile dressing applied, Cap/Mask and Sterile gown  Skin Prep Agent Completely Dried Prior to Procedure: Yes    Patient Position:  Trendelenburg  Laterality:  Right  Site:  Internal jugular  PA Catheter Placement:  Distal introducer port  PA Cath Lumens:  Quadruple  Introducer Size:  9 Fr  PA Catheter Size:  8  PA Catheter Type:  Oximetric  Number of Attempts:  1  Successful Placement: Yes    Placement Confirmation: pressure tracing changes, verified by MANSI, placement verified x-ray and transduced waveform    Procedure Uneventful: Yes    Post-Procedure:  Dressing applied and line secured  Events:  Patient tolerated procedure well with no complications  Performed By:  Anesthesiologist  Anesthesiologist:  Cookie Daigle MD     12:30

## 2022-12-19 NOTE — ED ADULT NURSE NOTE - CHIEF COMPLAINT QUOTE
Pt reporting abdominal pain x 2 weeks, worse tonight.  +nausea,  no vomiting. Who Performed The Pdt (Provider): Rafia Castaneda MD Who Performed The Pdt (Provider): Raifa Castaneda MD

## 2023-05-01 NOTE — PRE-ANESTHESIA EVALUATION ADULT - BP NONINVASIVE SYSTOLIC (MM HG)
Comments:     Last Office Visit (last PCP visit):   4/4/2023    Next Visit Date:  No future appointments. **If hasn't been seen in over a year OR hasn't followed up according to last diabetes/ADHD visit, make appointment for patient before sending refill to provider.     Rx requested:  Requested Prescriptions     Pending Prescriptions Disp Refills    cyclobenzaprine (FLEXERIL) 10 MG tablet [Pharmacy Med Name: CYCLOBENZAPRINE 10 MG TABLET] 30 tablet 1     Sig: take 1 tablet by mouth three times a day if needed for muscle spasm 171

## 2023-10-24 NOTE — CONSULT NOTE ADULT - PROBLEM SELECTOR RECOMMENDATION 2
Likely inflammation related to gallstone pancreatitis, now downtrending.   - continue monitoring for now Picato Counseling:  I discussed with the patient the risks of Picato including but not limited to erythema, scaling, itching, weeping, crusting, and pain.

## 2023-11-01 NOTE — H&P PST ADULT - SOURCE OF INFORMATION, PROFILE
Patient is home oxygen dependent 4 L at baseline. Currently O2 saturation 99% on 2 L nasal cannula. Former smoker. patient

## 2025-03-21 NOTE — PATIENT PROFILE ADULT - NSSCCAGEALCOHOLANNOYEDYOU_GEN_A_NUR
Caller: Jina Riley    Relationship: Emergency Contact    Best call back number:[phone]    Requested Prescriptions:   Requested Prescriptions     Pending Prescriptions Disp Refills    amLODIPine (NORVASC) 10 MG tablet 90 tablet 2     Sig: Take 1 tablet by mouth Daily.        Pharmacy where request should be sent: [facility] [address] [place] [address] [phone] [facility] [phone]      Last office visit with prescribing clinician: 9/27/2023   Last telemedicine visit with prescribing clinician: Visit date not found   Next office visit with prescribing clinician: Visit date not found     Additional details provided by patient: PATIENT IS OUT OF MEDICATION AND WOULD LIKE FOR IT TO BE SENT IN AT A 90 DAY SUPPLY    Does the patient have less than a 3 day supply:  [x] Yes  [] No    Would you like a call back once the refill request has been completed: [] Yes [] No    If the office needs to give you a call back, can they leave a voicemail: [] Yes [] No    Neal Ulrich Rep   03/21/25 11:35 EDT           
no
